# Patient Record
Sex: FEMALE | Race: WHITE
[De-identification: names, ages, dates, MRNs, and addresses within clinical notes are randomized per-mention and may not be internally consistent; named-entity substitution may affect disease eponyms.]

---

## 2017-01-06 ENCOUNTER — HOSPITAL ENCOUNTER (EMERGENCY)
Dept: HOSPITAL 47 - EC | Age: 72
Discharge: HOME | End: 2017-01-06
Payer: MEDICARE

## 2017-01-06 VITALS — DIASTOLIC BLOOD PRESSURE: 63 MMHG | RESPIRATION RATE: 20 BRPM | TEMPERATURE: 99.7 F | SYSTOLIC BLOOD PRESSURE: 145 MMHG

## 2017-01-06 VITALS — HEART RATE: 80 BPM

## 2017-01-06 DIAGNOSIS — Z79.51: ICD-10-CM

## 2017-01-06 DIAGNOSIS — Z79.899: ICD-10-CM

## 2017-01-06 DIAGNOSIS — J44.1: Primary | ICD-10-CM

## 2017-01-06 DIAGNOSIS — Z87.891: ICD-10-CM

## 2017-01-06 DIAGNOSIS — Z92.3: ICD-10-CM

## 2017-01-06 DIAGNOSIS — Z87.19: ICD-10-CM

## 2017-01-06 DIAGNOSIS — Z85.42: ICD-10-CM

## 2017-01-06 DIAGNOSIS — Z87.01: ICD-10-CM

## 2017-01-06 LAB
ALP SERPL-CCNC: 127 U/L (ref 38–126)
ALT SERPL-CCNC: 30 U/L (ref 9–52)
ANION GAP SERPL CALC-SCNC: 10 MMOL/L
APTT BLD: 22.9 SEC (ref 22–30)
AST SERPL-CCNC: 36 U/L (ref 14–36)
BASOPHILS # BLD AUTO: 0.1 K/UL (ref 0–0.2)
BASOPHILS NFR BLD AUTO: 1 %
BUN SERPL-SCNC: 11 MG/DL (ref 7–17)
CALCIUM SPEC-MCNC: 8.6 MG/DL (ref 8.4–10.2)
CH: 31.9
CHCM: 32
CHLORIDE SERPL-SCNC: 103 MMOL/L (ref 98–107)
CK SERPL-CCNC: 85 U/L (ref 30–135)
CO2 SERPL-SCNC: 26 MMOL/L (ref 22–30)
EOSINOPHIL # BLD AUTO: 0.1 K/UL (ref 0–0.7)
EOSINOPHIL NFR BLD AUTO: 1 %
ERYTHROCYTE [DISTWIDTH] IN BLOOD BY AUTOMATED COUNT: 4.36 M/UL (ref 3.8–5.4)
ERYTHROCYTE [DISTWIDTH] IN BLOOD: 13.6 % (ref 11.5–15.5)
GLUCOSE SERPL-MCNC: 99 MG/DL (ref 74–99)
HCT VFR BLD AUTO: 43.7 % (ref 34–46)
HDW: 2.7
HGB BLD-MCNC: 13.9 GM/DL (ref 11.4–16)
INR PPP: 1 (ref ?–1.1)
LUC NFR BLD AUTO: 2 %
LYMPHOCYTES # SPEC AUTO: 1.7 K/UL (ref 1–4.8)
LYMPHOCYTES NFR SPEC AUTO: 11 %
MAGNESIUM SPEC-SCNC: 1.6 MG/DL (ref 1.6–2.3)
MCH RBC QN AUTO: 31.9 PG (ref 25–35)
MCHC RBC AUTO-ENTMCNC: 31.8 G/DL (ref 31–37)
MCV RBC AUTO: 100.2 FL (ref 80–100)
MONOCYTES # BLD AUTO: 0.9 K/UL (ref 0–1)
MONOCYTES NFR BLD AUTO: 6 %
NEUTROPHILS # BLD AUTO: 12.2 K/UL (ref 1.3–7.7)
NEUTROPHILS NFR BLD AUTO: 80 %
NON-AFRICAN AMERICAN GFR(MDRD): >60
POTASSIUM SERPL-SCNC: 5.8 MMOL/L (ref 3.5–5.1)
PROT SERPL-MCNC: 7.4 G/DL (ref 6.3–8.2)
PT BLD: 10.5 SEC (ref 9–12)
SODIUM SERPL-SCNC: 139 MMOL/L (ref 137–145)
TROPONIN I SERPL-MCNC: <0.012 NG/ML (ref 0–0.03)
WBC # BLD AUTO: 0.25 10*3/UL
WBC # BLD AUTO: 15.3 K/UL (ref 3.8–10.6)
WBC (PEROX): 15.66

## 2017-01-06 PROCEDURE — 82553 CREATINE MB FRACTION: CPT

## 2017-01-06 PROCEDURE — 82550 ASSAY OF CK (CPK): CPT

## 2017-01-06 PROCEDURE — 85610 PROTHROMBIN TIME: CPT

## 2017-01-06 PROCEDURE — 71020: CPT

## 2017-01-06 PROCEDURE — 84484 ASSAY OF TROPONIN QUANT: CPT

## 2017-01-06 PROCEDURE — 80053 COMPREHEN METABOLIC PANEL: CPT

## 2017-01-06 PROCEDURE — 93005 ELECTROCARDIOGRAM TRACING: CPT

## 2017-01-06 PROCEDURE — 85025 COMPLETE CBC W/AUTO DIFF WBC: CPT

## 2017-01-06 PROCEDURE — 83735 ASSAY OF MAGNESIUM: CPT

## 2017-01-06 PROCEDURE — 36415 COLL VENOUS BLD VENIPUNCTURE: CPT

## 2017-01-06 PROCEDURE — 83880 ASSAY OF NATRIURETIC PEPTIDE: CPT

## 2017-01-06 PROCEDURE — 96374 THER/PROPH/DIAG INJ IV PUSH: CPT

## 2017-01-06 PROCEDURE — 99285 EMERGENCY DEPT VISIT HI MDM: CPT

## 2017-01-06 PROCEDURE — 85730 THROMBOPLASTIN TIME PARTIAL: CPT

## 2017-01-06 PROCEDURE — 94640 AIRWAY INHALATION TREATMENT: CPT

## 2017-01-06 PROCEDURE — 87040 BLOOD CULTURE FOR BACTERIA: CPT
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
## 2017-01-06 NOTE — XR
EXAMINATION TYPE: XR chest 2V

 

DATE OF EXAM: 1/6/2017 3:19 PM

 

COMPARISON: Chest x-ray September 16, 2016

 

HISTORY: Shortness of breath, possible pneumonia. History of COPD

 

TECHNIQUE:  Frontal and lateral views of the chest are obtained.

 

FINDINGS:  Underlying emphysematous change is felt present. There is elevated left hemidiaphragm. The
re is eventrated right hemidiaphragm redemonstrated. There is no focal air space opacity, pleural eff
usion, or pneumothorax seen.  The cardiac silhouette size is stable and within normal limits with ath
erosclerotic aorta.   The osseous structures are demineralized.

 

IMPRESSION: Chronic emphysematous change without acute pulmonary process. No significant change from 
prior.

## 2017-01-23 ENCOUNTER — HOSPITAL ENCOUNTER (OUTPATIENT)
Dept: HOSPITAL 47 - RADECHMAIN | Age: 72
Discharge: HOME | End: 2017-01-23
Attending: PHYSICIAN ASSISTANT
Payer: MEDICARE

## 2017-01-23 DIAGNOSIS — I27.2: ICD-10-CM

## 2017-01-23 DIAGNOSIS — I08.3: Primary | ICD-10-CM

## 2017-01-23 DIAGNOSIS — I51.7: ICD-10-CM

## 2017-01-23 PROCEDURE — 93306 TTE W/DOPPLER COMPLETE: CPT

## 2017-01-24 NOTE — ECHOF
Referral Reason:R06.09 other forms of dyspnea



MEASUREMENTS

--------

HEIGHT: 165.1 cm

WEIGHT: 115.7 kg

BP: 

RVIDd:   2.5 cm     (< 3.3)

IVSd:   1.1 cm     (0.6 - 1.1)

LVIDd:   4.4 cm     (3.9 - 5.3)

LVPWd:   1.1 cm     (0.6 - 1.1)

IVSs:   1.3 cm

LVIDs:   2.9 cm

LVPWs:   1.4 cm

LA Diam:   4.1 cm     (2.7 - 3.8)

LAESV Index (A-L):   30.82 ml/m

Ao Diam:   2.6 cm     (2.0 - 3.7)

AV Cusp:   1.6 cm     (1.5 - 2.6)

LA Diam:   3.9 cm     (2.7 - 3.8)

MV EXCURSION:   13.970 mm     (> 18.000)

MV EF SLOPE:   75 mm/s     (70 - 150)

EPSS:   0.2 cm

MV E Mert:   0.84 m/s

MV DecT:   176 ms

MV A Mert:   1.04 m/s

MV E/A Ratio:   0.81 

RAP:   5.00 mmHg

RVSP:   39.41 mmHg







FINDINGS

--------

Sinus rhythm.

This was a technically adequate study.

There is mild concentric left ventricular hypertrophy.  

 Overall left ventricular systolic function is 

low-normal with, an EF between 50 - 55 %.

The right ventricle is normal in size.

LA is midly dilated 29-33ml/m2.

The right atrial size is normal.

There is mild aortic valve sclerosis.   There is no 

evidence of aortic regurgitation.

Mild mitral annular calcification present.   Mild 

mitral regurgitation is present.

Mild tricuspid regurgitation present.   There is mild 

pulmonary hypertension.   The right ventricular 

systolic pressure, as measured by Doppler, is 39.41mmHg.

There is no pulmonic regurgitation present.

The aortic root size is normal.

There is no pericardial effusion.



CONCLUSIONS

--------

1. There is mild concentric left ventricular hypertrophy.

2. Overall left ventricular systolic function is 

low-normal with, an EF between 50 - 55 %.

3. LA is midly dilated 29-33ml/m2.

4. There is mild aortic valve sclerosis.

5. Mild mitral annular calcification present.

6. Mild mitral regurgitation is present.

7. Mild tricuspid regurgitation present.

8. There is mild pulmonary hypertension.

9. The right ventricular systolic pressure, as measured by 

Doppler, is 39.41mmHg.





SONOGRAPHER: Telma Flores RDCS

## 2017-04-04 ENCOUNTER — HOSPITAL ENCOUNTER (EMERGENCY)
Dept: HOSPITAL 47 - EC | Age: 72
Discharge: HOME | End: 2017-04-04
Payer: MEDICARE

## 2017-04-04 VITALS
TEMPERATURE: 97.5 F | HEART RATE: 73 BPM | SYSTOLIC BLOOD PRESSURE: 127 MMHG | DIASTOLIC BLOOD PRESSURE: 61 MMHG | RESPIRATION RATE: 16 BRPM

## 2017-04-04 DIAGNOSIS — Z87.891: ICD-10-CM

## 2017-04-04 DIAGNOSIS — E78.5: ICD-10-CM

## 2017-04-04 DIAGNOSIS — Z79.52: ICD-10-CM

## 2017-04-04 DIAGNOSIS — J45.909: ICD-10-CM

## 2017-04-04 DIAGNOSIS — Z87.01: ICD-10-CM

## 2017-04-04 DIAGNOSIS — J44.1: Primary | ICD-10-CM

## 2017-04-04 DIAGNOSIS — Z79.51: ICD-10-CM

## 2017-04-04 DIAGNOSIS — Z85.42: ICD-10-CM

## 2017-04-04 DIAGNOSIS — Z99.81: ICD-10-CM

## 2017-04-04 DIAGNOSIS — Z92.3: ICD-10-CM

## 2017-04-04 LAB
ANION GAP SERPL CALC-SCNC: 9 MMOL/L
BASOPHILS # BLD AUTO: 0.1 K/UL (ref 0–0.2)
BASOPHILS NFR BLD AUTO: 1 %
BUN SERPL-SCNC: 20 MG/DL (ref 7–17)
CALCIUM SPEC-MCNC: 8.8 MG/DL (ref 8.4–10.2)
CH: 31.5
CHCM: 33.5
CHLORIDE SERPL-SCNC: 100 MMOL/L (ref 98–107)
CO2 SERPL-SCNC: 30 MMOL/L (ref 22–30)
EOSINOPHIL # BLD AUTO: 0.1 K/UL (ref 0–0.7)
EOSINOPHIL NFR BLD AUTO: 1 %
ERYTHROCYTE [DISTWIDTH] IN BLOOD BY AUTOMATED COUNT: 3.74 M/UL (ref 3.8–5.4)
ERYTHROCYTE [DISTWIDTH] IN BLOOD: 14.6 % (ref 11.5–15.5)
GLUCOSE SERPL-MCNC: 113 MG/DL (ref 74–99)
HCT VFR BLD AUTO: 35.3 % (ref 34–46)
HDW: 2.65
HGB BLD-MCNC: 11.9 GM/DL (ref 11.4–16)
LUC NFR BLD AUTO: 3 %
LYMPHOCYTES # SPEC AUTO: 1.2 K/UL (ref 1–4.8)
LYMPHOCYTES NFR SPEC AUTO: 9 %
MCH RBC QN AUTO: 31.9 PG (ref 25–35)
MCHC RBC AUTO-ENTMCNC: 33.7 G/DL (ref 31–37)
MCV RBC AUTO: 94.6 FL (ref 80–100)
MONOCYTES # BLD AUTO: 0.6 K/UL (ref 0–1)
MONOCYTES NFR BLD AUTO: 5 %
NEUTROPHILS # BLD AUTO: 11 K/UL (ref 1.3–7.7)
NEUTROPHILS NFR BLD AUTO: 82 %
NON-AFRICAN AMERICAN GFR(MDRD): >60
POTASSIUM SERPL-SCNC: 5.2 MMOL/L (ref 3.5–5.1)
SODIUM SERPL-SCNC: 139 MMOL/L (ref 137–145)
WBC # BLD AUTO: 0.37 10*3/UL
WBC # BLD AUTO: 13.3 K/UL (ref 3.8–10.6)
WBC (PEROX): 13.91

## 2017-04-04 PROCEDURE — 83880 ASSAY OF NATRIURETIC PEPTIDE: CPT

## 2017-04-04 PROCEDURE — 85379 FIBRIN DEGRADATION QUANT: CPT

## 2017-04-04 PROCEDURE — 80048 BASIC METABOLIC PNL TOTAL CA: CPT

## 2017-04-04 PROCEDURE — 99285 EMERGENCY DEPT VISIT HI MDM: CPT

## 2017-04-04 PROCEDURE — 96374 THER/PROPH/DIAG INJ IV PUSH: CPT

## 2017-04-04 PROCEDURE — 71275 CT ANGIOGRAPHY CHEST: CPT

## 2017-04-04 PROCEDURE — 94640 AIRWAY INHALATION TREATMENT: CPT

## 2017-04-04 PROCEDURE — 87502 INFLUENZA DNA AMP PROBE: CPT

## 2017-04-04 PROCEDURE — 93005 ELECTROCARDIOGRAM TRACING: CPT

## 2017-04-04 PROCEDURE — 85025 COMPLETE CBC W/AUTO DIFF WBC: CPT

## 2017-04-04 PROCEDURE — 71020: CPT

## 2017-04-04 PROCEDURE — 36415 COLL VENOUS BLD VENIPUNCTURE: CPT

## 2017-04-04 PROCEDURE — 84484 ASSAY OF TROPONIN QUANT: CPT

## 2017-04-04 NOTE — CT
EXAMINATION TYPE: CT chest angio for PE

 

DATE OF EXAM: 4/4/2017 5:54 PM

 

COMPARISON: 9/7/2013

 

HISTORY: shortness of breath

 

CT DLP: 848.8 mGycm

Automated exposure control for dose reduction was used.

 

CONTRAST: 

CT Chest for pulmonary embolism performed with with IV Contrast, patient injected with 100 mL of Omni
paque 350. There are 3-D post processed images.

 

FINDINGS:

There is patchy groundglass interstitial infiltrate in both lungs and worse on the right side. There 
is some subpleural infiltrate in the right middle lobe. There is no pericardial effusion. There is no
 pleural effusion.

 

I see no filling defects in the pulmonary arteries. There are no hilar masses. There is no mediastina
l adenopathy. Thoracic aorta is atheromatous. There is no sign of aortic aneurysm or dissection. Ther
e is some mild subpleural infiltrate as well in the lateral left lung base and the lingula left upper
 lobe. I see no bony destructive process.

IMPRESSION:

No evidence of pulmonary embolism. There is patchy groundglass interstitial pulmonary infiltrates and
 also subpleural small areas of consolidation in both lungs similar to the old exam and consistent wi
th nonspecific interstitial lung disease. There is a changing pattern of infiltrate at the lung bases
 compared to old exam.
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
## 2017-04-04 NOTE — XR
EXAMINATION TYPE: XR chest 2V

 

DATE OF EXAM: 4/4/2017 4:40 PM

 

COMPARISON: 1/6/2017

 

HISTORY: 71 year-old female shortness of breath

 

TECHNIQUE:  PA and lateral views

 

FINDINGS:  

Heart is normal size. Aortopulmonary vasculature within normal limits. Mild diffuse interstitial prom
inence without consolidation or pleural effusion.

 

 

IMPRESSION:  

Interstitial changes appear in part chronic. These do appear more accentuated from previous. Correlat
e for possible bronchitis, worsening asthma, or atypical pneumonias.

## 2017-05-08 ENCOUNTER — HOSPITAL ENCOUNTER (INPATIENT)
Dept: HOSPITAL 47 - 2ORMAIN | Age: 72
LOS: 7 days | Discharge: NURSING, REHAB OR LONG-TERM CARE | DRG: 167 | End: 2017-05-15
Payer: MEDICARE

## 2017-05-08 VITALS — BODY MASS INDEX: 47.5 KG/M2

## 2017-05-08 DIAGNOSIS — J98.11: ICD-10-CM

## 2017-05-08 DIAGNOSIS — J84.9: Primary | ICD-10-CM

## 2017-05-08 DIAGNOSIS — Z79.899: ICD-10-CM

## 2017-05-08 DIAGNOSIS — J95.812: ICD-10-CM

## 2017-05-08 DIAGNOSIS — Z82.5: ICD-10-CM

## 2017-05-08 DIAGNOSIS — N17.9: ICD-10-CM

## 2017-05-08 DIAGNOSIS — E87.5: ICD-10-CM

## 2017-05-08 DIAGNOSIS — Z71.3: ICD-10-CM

## 2017-05-08 DIAGNOSIS — F17.200: ICD-10-CM

## 2017-05-08 DIAGNOSIS — J44.1: ICD-10-CM

## 2017-05-08 DIAGNOSIS — R73.09: ICD-10-CM

## 2017-05-08 DIAGNOSIS — Z85.42: ICD-10-CM

## 2017-05-08 DIAGNOSIS — J45.20: ICD-10-CM

## 2017-05-08 DIAGNOSIS — Z90.710: ICD-10-CM

## 2017-05-08 DIAGNOSIS — J84.116: ICD-10-CM

## 2017-05-08 DIAGNOSIS — E78.5: ICD-10-CM

## 2017-05-08 DIAGNOSIS — Z79.51: ICD-10-CM

## 2017-05-08 DIAGNOSIS — Z99.81: ICD-10-CM

## 2017-05-08 DIAGNOSIS — R01.1: ICD-10-CM

## 2017-05-08 DIAGNOSIS — D72.829: ICD-10-CM

## 2017-05-08 DIAGNOSIS — Z90.49: ICD-10-CM

## 2017-05-08 DIAGNOSIS — T79.7XXA: ICD-10-CM

## 2017-05-08 DIAGNOSIS — Z79.52: ICD-10-CM

## 2017-05-08 DIAGNOSIS — M19.90: ICD-10-CM

## 2017-05-08 DIAGNOSIS — I89.0: ICD-10-CM

## 2017-05-08 DIAGNOSIS — M48.00: ICD-10-CM

## 2017-05-08 DIAGNOSIS — J96.11: ICD-10-CM

## 2017-05-08 DIAGNOSIS — Z86.69: ICD-10-CM

## 2017-05-08 DIAGNOSIS — E66.9: ICD-10-CM
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
## 2017-05-08 DIAGNOSIS — Z99.3: ICD-10-CM

## 2017-05-08 DIAGNOSIS — K21.9: ICD-10-CM

## 2017-05-08 DIAGNOSIS — Z87.01: ICD-10-CM

## 2017-05-08 DIAGNOSIS — R32: ICD-10-CM

## 2017-05-08 DIAGNOSIS — F32.9: ICD-10-CM

## 2017-05-08 DIAGNOSIS — I10: ICD-10-CM

## 2017-05-08 DIAGNOSIS — Z87.11: ICD-10-CM

## 2017-05-08 LAB — GLUCOSE BLD-MCNC: 123 MG/DL (ref 75–99)

## 2017-05-08 PROCEDURE — 0BBF4ZX EXCISION OF RIGHT LOWER LUNG LOBE, PERCUTANEOUS ENDOSCOPIC APPROACH, DIAGNOSTIC: ICD-10-PCS

## 2017-05-08 PROCEDURE — 71020: CPT

## 2017-05-08 PROCEDURE — 87116 MYCOBACTERIA CULTURE: CPT

## 2017-05-08 PROCEDURE — 87206 SMEAR FLUORESCENT/ACID STAI: CPT

## 2017-05-08 PROCEDURE — 94640 AIRWAY INHALATION TREATMENT: CPT

## 2017-05-08 PROCEDURE — 88307 TISSUE EXAM BY PATHOLOGIST: CPT

## 2017-05-08 PROCEDURE — 71010: CPT

## 2017-05-08 PROCEDURE — 85025 COMPLETE CBC W/AUTO DIFF WBC: CPT

## 2017-05-08 PROCEDURE — 87102 FUNGUS ISOLATION CULTURE: CPT

## 2017-05-08 PROCEDURE — 0BBC4ZX EXCISION OF RIGHT UPPER LUNG LOBE, PERCUTANEOUS ENDOSCOPIC APPROACH, DIAGNOSTIC: ICD-10-PCS

## 2017-05-08 PROCEDURE — 94760 N-INVAS EAR/PLS OXIMETRY 1: CPT

## 2017-05-08 PROCEDURE — 0W9930Z DRAINAGE OF RIGHT PLEURAL CAVITY WITH DRAINAGE DEVICE, PERCUTANEOUS APPROACH: ICD-10-PCS

## 2017-05-08 PROCEDURE — 0BBD4ZX EXCISION OF RIGHT MIDDLE LUNG LOBE, PERCUTANEOUS ENDOSCOPIC APPROACH, DIAGNOSTIC: ICD-10-PCS

## 2017-05-08 PROCEDURE — 80048 BASIC METABOLIC PNL TOTAL CA: CPT

## 2017-05-08 RX ADMIN — POTASSIUM CHLORIDE SCH MLS/HR: 14.9 INJECTION, SOLUTION INTRAVENOUS at 17:11

## 2017-05-08 RX ADMIN — IPRATROPIUM BROMIDE AND ALBUTEROL SULFATE SCH ML: .5; 3 SOLUTION RESPIRATORY (INHALATION) at 19:55

## 2017-05-08 RX ADMIN — HYDROCODONE BITARTRATE AND ACETAMINOPHEN PRN EACH: 5; 325 TABLET ORAL at 17:50

## 2017-05-08 RX ADMIN — IPRATROPIUM BROMIDE AND ALBUTEROL SULFATE SCH: .5; 3 SOLUTION RESPIRATORY (INHALATION) at 14:48

## 2017-05-08 RX ADMIN — HYDROMORPHONE HYDROCHLORIDE PRN MG: 1 INJECTION, SOLUTION INTRAMUSCULAR; INTRAVENOUS; SUBCUTANEOUS at 11:18

## 2017-05-08 RX ADMIN — BUDESONIDE AND FORMOTEROL FUMARATE DIHYDRATE SCH PUFF: 160; 4.5 AEROSOL RESPIRATORY (INHALATION) at 19:57

## 2017-05-08 RX ADMIN — HYDROCODONE BITARTRATE AND ACETAMINOPHEN PRN EACH: 5; 325 TABLET ORAL at 23:15

## 2017-05-08 RX ADMIN — HYDROMORPHONE HYDROCHLORIDE PRN MG: 1 INJECTION, SOLUTION INTRAMUSCULAR; INTRAVENOUS; SUBCUTANEOUS at 11:00

## 2017-05-08 RX ADMIN — HEPARIN SODIUM SCH UNIT: 5000 INJECTION, SOLUTION INTRAVENOUS; SUBCUTANEOUS at 23:10

## 2017-05-08 RX ADMIN — IPRATROPIUM BROMIDE AND ALBUTEROL SULFATE SCH ML: .5; 3 SOLUTION RESPIRATORY (INHALATION) at 16:02

## 2017-05-08 NOTE — P.OP
Date of Procedure: 05/08/17


Preoperative Diagnosis: 


Bilateral pulmonary infiltrates


Postoperative Diagnosis: 


Bilateral pulmonary infiltrates


Procedure(s) Performed: 


Thoracoscopic right lung biopsy


Anesthesia: SUHA


Surgeon: Grabiel Banerjee


Assistant #1: Marcos Tomlinson


Estimated Blood Loss (ml): 10


IV fluids (ml): 200


Urine output (ml): 0


Pathology: other (Random wedge biopsies of right upper middle and lower lobe 

were sent for pathology as well as cultures including routine acid-fast and 

fungal cultures)


Condition: stable


Disposition: PACU


Indications for Procedure: 


Bilateral pulmonary infiltrates


Operative Findings: 


Lung compliance was poor.  There was mild anthracotic staining in the lung 

diffusely.  The lung otherwise appeared normal.  There were no pulmonary masses 

noted.


Description of Procedure: 


The patient was brought to the operating room placed supine on the operating 

table anesthetized and intubated with a double-lumen endotracheal tube.  The 

tube was positioned with fiberoptic bronchoscopy.  Patient was turned in the 

left lateral decubitus position.  The chest was sterilely prepped and draped on 

the right side.  3 one-inch incisions were performed in the chest.  The right 

lung was deflated.  The chest was explored.  Lung compliance was poor.  There 

were no evident masses or abnormalities other than poor lung compliance and 

mild anthracotic pigment





Wedge resections of the upper middle and lower lobe were each obtained 

separately with 2 firings each of Endo NICANOR 45 blue staple load.  These were 

brought out onto the field and divided a small portion of each was sent for 

culture.  The remainder was sent for pathology.  On completion of the biopsies 

the lung was inflated under direct vision.  No air leaks noted.  28-Angolan 

chest tube was placed anteriorly through a separate stab incision and 

positioned posterior apically.  It was secured with 0 Ethibond suture.  Rib 

blocks were performed at the level of the incisions with total of 10 mL of half 

percent Marcaine.  The incisions were then closed with layers of Vicryl suture 

and dressed with glue and Band-Aid dressings.  Chest tube dressing was placed 

around the chest tube it was secured and connected to a Pleur-evac.





The patient was turned supine and extubated and transferred to recovery in 

stable condition.

## 2017-05-08 NOTE — CONS
DATE OF CONSULTATION:  



This patient is a 71-year-old female with a history of underlying COPD 

and interstitial lung disease. Because of worsening respiratory 

symptoms and the inability to gain control over the patient's 

respiratory symptoms, after a long discussion with the patient 

weighing both the pros and cons, it was decided that she would be sent 

for a VATS lung biopsy to better determine the cause of her 

interstitial lung disease. Because of ongoing tobacco use, I was 

concerned about 2 processes in particular, one being respiratory 

bronchiolitis interstitial lung disease (RB-ILD), and DIP 

(desquamative interstitial pneumonia).  The patient was sent to Dr. Claus Banerjee for evaluation. Today she had a VATS procedure done on 

the right side and she had a biopsy performed. The patient seemed to 

be resting relatively comfortably. Of course, she has the 

postoperative pain that one would expect. No respiratory issues to 

speak of. No shortness of breath more than usual. She does have 

chronic dyspnea with any activity. No fever. No chills. No chest pain. 

No nausea, vomiting or diarrhea.  



Her past medical history is positive for COPD interstitial lung 

disease. Her surgical history is mostly remote.  



Her home medications include: 

1. Requip.

2. Prednisone. 

3. Potassium chloride. 

4. DuoNeb. 

5. Lasix. 

6. Vitamin D3. 

7. Symbicort. 

8. Albuterol inhaler. 

9. Tudorza. 



ALLERGIES: DENIED. 



Social history is positive for ongoing tobacco use. Family history is 

noncontributory. Occupational history is noncontributory.  



REVIEW OF SYSTEMS: 

CONSTITUTIONAL: Negative. 

NEUROLOGIC: Negative. 

HEENT: Negative. 

CARDIOVASCULAR: Negative. 

PULMONARY: Shortness of breath, persistent and severe. 

GI/: Negative. 

RHEUMATOLOGICAL/IMMUNOLOGIC: Negative. 

ENDOCRINOLOGIC: Negative. 

DERMATOLOGIC: Negative. 



Current vital signs include temperature 96.7, heart rate 71, 

respiratory rate 20, blood pressure 150/74. Three-liter saturation 

94%. Appears in no acute distress. HEENT examination is grossly 

unremarkable. Mucous membranes are moist. No oral lesions. Neck is 

supple. Full range of motion. No adenopathy or thyromegaly. Neck veins 

are flat. Cardiovascular examination reveals regular rhythm and rate. 

S1, S2 normal. No S3, S4 or murmur. Lungs reveal bibasilar crackles. 

Breath sounds are diminished. A few scattered coarse rhonchi are 

noted. Breath sounds are equal but diminished throughout. Abdomen is 

soft. Bowel sounds are heard. Extremities are intact. No cyanosis, 

clubbing or edema. Skin without rash.  

Neurological examination is nonfocal. 



Lab data is reviewed. The only present currently is a glucose of 123. 

X-rays are reviewed. Medications are reviewed.  



ASSESSMENT: 

1. Status post VATS lung biopsy, right-sided, for interstitial lung 

disease, with concerns about desquamative interstitial pneumonia and 

respiratory bronchiolitis interstitial lung disease, given the 

patient's ongoing tobacco use.  

2. History of chronic obstructive pulmonary disease. 

3. History of interstitial lung disease. 

4. Multiple chronic obstructive pulmonary disease and respiratory 

exacerbations.  



PLAN: Will await the results of lung biopsy. Hopefully discharge in 

the next couple of days. Will continue to follow closely. No 

additional recommendations are made. Medications are reviewed. Labs 

are reviewed. Will follow.

## 2017-05-08 NOTE — XR
EXAMINATION TYPE: XR chest 1V

 

DATE OF EXAM: 5/8/2017 10:54 AM

 

COMPARISON: 4/4/2017

 

HISTORY: Post

 

TECHNIQUE: Single frontal view of the chest is obtained.

 

FINDINGS:  Right-sided chest tube seen with no sizable pneumothorax. Bilateral areas of consolidation
 and small left effusion noted. Heart size stable. Arthropathy of the shoulders.

 

IMPRESSION:  

1. Bilateral areas of consolidation and small left.

2. No evidence of pneumothorax. Chest tube noted.

## 2017-05-09 LAB
ANION GAP SERPL CALC-SCNC: 7 MMOL/L
BASOPHILS # BLD AUTO: 0 K/UL (ref 0–0.2)
BASOPHILS NFR BLD AUTO: 0 %
BUN SERPL-SCNC: 27 MG/DL (ref 7–17)
CALCIUM SPEC-MCNC: 8.4 MG/DL (ref 8.4–10.2)
CH: 31.6
CHCM: 31.5
CHLORIDE SERPL-SCNC: 101 MMOL/L (ref 98–107)
CO2 SERPL-SCNC: 30 MMOL/L (ref 22–30)
EOSINOPHIL # BLD AUTO: 0 K/UL (ref 0–0.7)
EOSINOPHIL NFR BLD AUTO: 0 %
ERYTHROCYTE [DISTWIDTH] IN BLOOD BY AUTOMATED COUNT: 3.58 M/UL (ref 3.8–5.4)
ERYTHROCYTE [DISTWIDTH] IN BLOOD: 15.1 % (ref 11.5–15.5)
GLUCOSE SERPL-MCNC: 168 MG/DL (ref 74–99)
HCT VFR BLD AUTO: 36.2 % (ref 34–46)
HDW: 2.87
HGB BLD-MCNC: 11.6 GM/DL (ref 11.4–16)
LUC NFR BLD AUTO: 3 %
LYMPHOCYTES # SPEC AUTO: 2.1 K/UL (ref 1–4.8)
LYMPHOCYTES NFR SPEC AUTO: 14 %
MCH RBC QN AUTO: 32.4 PG (ref 25–35)
MCHC RBC AUTO-ENTMCNC: 32.1 G/DL (ref 31–37)
MCV RBC AUTO: 101.1 FL (ref 80–100)
MONOCYTES # BLD AUTO: 1.3 K/UL (ref 0–1)
MONOCYTES NFR BLD AUTO: 9 %
NEUTROPHILS # BLD AUTO: 10.7 K/UL (ref 1.3–7.7)
NEUTROPHILS NFR BLD AUTO: 73 %
NON-AFRICAN AMERICAN GFR(MDRD): 42
POTASSIUM SERPL-SCNC: 5.4 MMOL/L (ref 3.5–5.1)
SODIUM SERPL-SCNC: 138 MMOL/L (ref 137–145)
WBC # BLD AUTO: 0.46 10*3/UL
WBC # BLD AUTO: 14.7 K/UL (ref 3.8–10.6)
WBC (PEROX): 13.21

## 2017-05-09 RX ADMIN — PANTOPRAZOLE SODIUM SCH MG: 40 TABLET, DELAYED RELEASE ORAL at 06:39

## 2017-05-09 RX ADMIN — POTASSIUM CHLORIDE SCH MLS/HR: 14.9 INJECTION, SOLUTION INTRAVENOUS at 06:33

## 2017-05-09 RX ADMIN — SODIUM CHLORIDE, PRESERVATIVE FREE SCH ML: 5 INJECTION INTRAVENOUS at 22:32

## 2017-05-09 RX ADMIN — IPRATROPIUM BROMIDE AND ALBUTEROL SULFATE SCH ML: .5; 3 SOLUTION RESPIRATORY (INHALATION) at 16:39

## 2017-05-09 RX ADMIN — IPRATROPIUM BROMIDE AND ALBUTEROL SULFATE SCH ML: .5; 3 SOLUTION RESPIRATORY (INHALATION) at 20:32

## 2017-05-09 RX ADMIN — HYDROCODONE BITARTRATE AND ACETAMINOPHEN PRN EACH: 5; 325 TABLET ORAL at 08:24

## 2017-05-09 RX ADMIN — Medication SCH UNIT: at 08:26

## 2017-05-09 RX ADMIN — BUDESONIDE AND FORMOTEROL FUMARATE DIHYDRATE SCH PUFF: 160; 4.5 AEROSOL RESPIRATORY (INHALATION) at 07:57

## 2017-05-09 RX ADMIN — SODIUM CHLORIDE, PRESERVATIVE FREE SCH: 5 INJECTION INTRAVENOUS at 08:26

## 2017-05-09 RX ADMIN — HYDROCODONE BITARTRATE AND ACETAMINOPHEN PRN EACH: 5; 325 TABLET ORAL at 23:30

## 2017-05-09 RX ADMIN — HEPARIN SODIUM SCH UNIT: 5000 INJECTION, SOLUTION INTRAVENOUS; SUBCUTANEOUS at 22:30

## 2017-05-09 RX ADMIN — IPRATROPIUM BROMIDE AND ALBUTEROL SULFATE SCH ML: .5; 3 SOLUTION RESPIRATORY (INHALATION) at 07:57

## 2017-05-09 RX ADMIN — BUDESONIDE AND FORMOTEROL FUMARATE DIHYDRATE SCH: 160; 4.5 AEROSOL RESPIRATORY (INHALATION) at 20:32

## 2017-05-09 RX ADMIN — IPRATROPIUM BROMIDE AND ALBUTEROL SULFATE SCH ML: .5; 3 SOLUTION RESPIRATORY (INHALATION) at 11:14

## 2017-05-09 RX ADMIN — BUDESONIDE AND FORMOTEROL FUMARATE DIHYDRATE SCH: 160; 4.5 AEROSOL RESPIRATORY (INHALATION) at 08:07

## 2017-05-09 RX ADMIN — HEPARIN SODIUM SCH UNIT: 5000 INJECTION, SOLUTION INTRAVENOUS; SUBCUTANEOUS at 08:25

## 2017-05-09 NOTE — P.PN
Subjective


Principal diagnosis: 





Bilateral pulmonary infiltrates, COPD





POD #1 thorascopic right lung biopsy





Currently sitting up in bed in no apparent distress.  Does complain of pain at 

her chest tube site.





Objective





- Vital Signs


Vital signs: 


 Vital Signs











Temp  97.0 F L  05/09/17 04:00


 


Pulse  76   05/09/17 04:00


 


Resp  20   05/09/17 04:00


 


BP  198/67   05/09/17 04:00


 


Pulse Ox  91 L  05/09/17 04:00








 Intake & Output











 05/08/17 05/09/17 05/09/17





 18:59 06:59 18:59


 


Intake Total 1820 920 


 


Output Total 50 147 


 


Balance 1770 773 


 


Weight  130 kg 


 


Intake:   


 


  IV 1300 820 


 


    Dextrose 5%-0.45% NaCl 1,  820 





    000 ml @ 75 mls/hr IV .   





    [id number] NED Rx#:[id number]   


 


  Intake, IV Titration 300 100 





  Amount   


 


    Dextrose 5%-0.45% NaCl 1, 300  





    000 ml @ 75 mls/hr IV .   





    [id number] NED Rx#:[id number]   


 


    ceFAZolin 3 gm In Sodium  100 





    Chloride 0.9% 100 ml @   





    100 mls/hr IVPB Q8HR NED   





    Rx#:[id number]   


 


  Oral 220  


 


Output:   


 


  Chest Tube Drainage 30 113 


 


    Chest Tube Right Lateral 30 113 





    Chest   


 


  Drainage  34 


 


    Right Lower Chest  34 


 


  Estimated Blood Loss 20  


 


Other:   


 


  Voiding Method Toilet Bedside Commode 





 Incontinent Incontinent 


 


  # Voids 1 0 














- Constitutional


General appearance: Present: cooperative, no acute distress





- Respiratory


Details: 





Lungs sounds diminished bilaterally.  Respirations even, nonlabored.  Currently 

on 3 L nasal cannula.  Oxygen saturation 91%.  Right pleural chest tube to -20 

cm wall suction.  Drained 60 mL serosanguineous fluid overnight, 210 mL since 

surgery.  Positive air leak.  Chest x-ray reviewed.  Achieving 1000 mL on 

incentive spirometry.  Effective cough.





- Cardiovascular


Details: 





S1, S2 present.  Regular rate and rhythm, normal sinus rhythm on telemetry.  

Positive lower extremity lymphedema.  Refusing to wear MONICA hose.  SCDs to be 

placed.





- Gastrointestinal


Gastrointestinal Comment(s): 





Abdomen soft, nontender, nondistended.  Active bowel sounds 4 quadrants.  

Tolerating diet.





- Genitourinary


Genitourinary Comment(s): 





Minimal clear yellow urine output.





- Musculoskeletal


Musculoskeletal: Present: strength equal bilaterally





- Psychiatric


Psychiatric: Present: A&O x's 3, appropriate affect, intact judgment & insight





- Allied health notes


Allied health notes reviewed: nursing





- Labs


CBC & Chem 7: 


 05/09/17 06:20





 05/09/17 06:20


Labs: 


 Abnormal Lab Results - Last 24 Hours (Table)











  05/08/17 05/09/17 05/09/17 Range/Units





  08:53 06:20 06:20 


 


WBC   14.7 H   (3.8-10.6)  k/uL


 


RBC   3.58 L   (3.80-5.40)  m/uL


 


MCV   101.1 H D   (80.0-100.0)  fL


 


Neutrophils #   10.7 H   (1.3-7.7)  k/uL


 


Monocytes #   1.3 H   (0-1.0)  k/uL


 


Potassium    5.4 H  (3.5-5.1)  mmol/L


 


BUN    27 H  (7-17)  mg/dL


 


Creatinine    1.25 H  (0.52-1.04)  mg/dL


 


Glucose    168 H  (74-99)  mg/dL


 


POC Glucose (mg/dL)  123 H    (75-99)  mg/dL








 Microbiology - Last 24 Hours (Table)











 05/08/17 10:05 Fungal Culture - Preliminary





 Lung - Right 


 


 05/08/17 10:00 Acid Fast Bacilli Culture - Preliminary





 Lung - Right Upper Lobe 


 


 05/08/17 10:05 Acid Fast Bacilli Culture - Preliminary





 Lung - Right 


 


 05/08/17 10:10 Fungal Culture - Preliminary





 Lung - Right Lower Lobe 


 


 05/08/17 10:10 Acid Fast Bacilli Culture - Preliminary





 Lung - Right Lower Lobe 


 


 05/08/17 10:00 Fungal Culture - Preliminary





 Lung - Right Upper Lobe 














- Imaging and Cardiology


Chest x-ray: image reviewed





Assessment and Plan


(1) Bilateral pulmonary infiltrates on CXR


Status: Acute   





(2) Acute exacerbation of chronic obstructive airways disease


Status: Acute   


Plan: 


Patient was seen and examined.  Right pleural chest tube with positive air 

leak.  Chest x-ray reviewed.  Will keep right pleural chest tube to wall 

suction.  Repeat chest x-ray in the morning.  Continue current medication 

regimen.  Encourage incentive spirometry use.  Place SCDs, encourage ambulation 

in the room.  Will consult internal medicine for comorbid medical issues.


Time with Patient: Greater than 30

## 2017-05-09 NOTE — PN
Postop day #1, status post right-sided VATS lung biopsy for 

interstitial lung disease. I am concerned about 2 disease processes 

including respiratory bronchiolitis interstitial lung disease as well 

as (      ) desquamative interstitial pneumonia. She has a history of 

chronic tobacco use and I believe she is probably still smoking. 

Anyway, from that standpoint, she is doing well. Surgery was done by 

Dr. Banerjee. She is feeling well. A little bit pain that is being 

controlled nicely with pain medication. I told her to use her 

incentive spirometer every one hour while awake.  



Current vital signs include a temperature which is 97, heart rate 76, 

respiratory rate 18, blood pressure 109/53, mean 71, three-liter 

saturation 93%. Appears in no acute distress.  

HEENT examination is grossly unremarkable. Mucous membranes are moist. 

No oral lesions.  

NECK:  Supple. Full range of motion. No adenopathy, thyromegaly or 

neck vein distention.  

Cardiovascular examination reveals regular rhythm and rate. Heart rate 

about mid 80s. S1, S2 normal. There is no murmur.  

Lungs reveal diminished breath sounds. She cannot take a deep breath 

because of the pain. A few scattered rhonchi. No wheezes.  

Abdomen is obese. Bowel sounds are heard. 

Extremities are intact. No cyanosis, clubbing or edema. 

Skin is without rash. 

Brief neurologic examination is nonfocal. 



White count 14.7, hemoglobin 11.6, hematocrit 36.2, platelet count 

normal. Sodium 138, potassium 5.4, chloride 101, CO2 of 30, BUN and 

creatinine were 27 and 1.25. The rest of the labs look okay.  



X-rays reviewed. 



ASSESSMENT: 

1. Postoperative day #1, status post right-sided VATS lung biopsy for 

interstitial lung disease. Concerns are that she might have something 

like desquamative interstitial pneumonia and/or respiratory 

bronchiolitis interstitial lung disease, both conditions associated 

with tobacco use.  

2. History of chronic obstructive pulmonary disease. 

3. Interstitial lung disease. 

4. Multiple chronic obstructive pulmonary disease exacerbations. 



PLAN: Will continue to follow. Prognosis is guarded. I told her to 

make sure she uses her incentive spirometer. Her medications have been 

reviewed. I did tell her that the specimens will be evaluated here by 

our general pathologist and likely sent to [facility] where they will 

probably be looked at by Dr. Butch Mathews who is an expert in lung 

pathology.

## 2017-05-09 NOTE — XR
EXAMINATION TYPE: XR chest 1V portable

 

DATE OF EXAM: 5/9/2017 7:29 AM

 

COMPARISON: Prior chest x-ray 5/8/2017

 

HISTORY: Postop, chest tube

 

TECHNIQUE: Single frontal view of the chest is obtained.

 

FINDINGS:  Right-sided chest tube remains in place. No sizable pneumothorax or pleural effusion. Patc
hy density is somewhat improved in the right lung. Cardiomediastinal silhouette, pulmonary vascularit
y and thiago are within normal limits.

 

IMPRESSION:  Improvement in aeration.

## 2017-05-09 NOTE — CONS
DATE OF CONSULTATION:  



REASON FOR CONSULTATION: Advice regarding hypertension and multiple 

other medical issues, requested by Dr. Banerjee.  



HISTORY OF PRESENT ILLNESS: This 71-year-old woman with a past medical 

history of multiple medical problems, including asthma, COPD, history 

of GERD, hypertension, hyperlipidemia, history of DJD, history of 

pneumonia, history of chronic hypoxic respiratory failure, history of 

depression, being followed by Dr. Moreira and Dr. Chino in the 

outpatient setting, underwent VATS lung biopsy to rule out the 

possibility of  interstitial lung disease by Dr. Banerjee. 

Postoperatively patient had some air leak. Chest tube was inserted and 

the patient is being closely monitored at this time. There is no 

history of any fever, rigor, or chills.  No history of any headache, 

loss of consciousness, seizures.  



PAST MEDICAL HISTORY: 

1. History of asthma. 

2. COPD. 

3. GERD. 

4. Hypertension. 

5. Hyperlipidemia.

6. History of DJD. 

7. History of chronic hypoxic respiratory failure. 

8. History of depression. 

9. History of nicotine dependence. 



HOME MEDICATIONS: 

1. Requip 0.5 mg at bedtime. 

2. Prednisone 5 mg daily. 

3. Klor-Con 10 mEq p.o. each morning. 

4. DuoNeb q.i.d. and p.r.n. 

5. Lasix 20 mg daily. 

6. Vitamin D3 1000 daily. 

7. Symbicort 160/4.5 two puffs b.i.d. 

8. Ventolin HFA 1 to 2 puffs q.6 p.r.n. 

9. Tudorza 1 puff daily. 



ALLERGIES: NONE. 



FAMILY HISTORY: No history of heart disease or strokes in the family.  

History of asthma in the family. 



SOCIAL HISTORY: Previous history of smoking. No current smoking or 

alcohol intake.  



REVIEW OF SYSTEMS: 

ENT: No diminishing hearing. No diminished vision. 

CARDIOVASCULAR SYSTEM: No angina, palpitations. 

RESPIRATORY SYSTEM: As mentioned earlier. 

GI: No nausea, vomiting.

: No dysuria. 

NERVOUS SYSTEM: No numbness or weakness. 

ALLERGY/IMMUNOLOGY: No asthma, hayfever. 

MUSCULOSKELETAL: As mentioned earlier. 

HEMATOLOGY/ONCOLOGY: No history of anemia. 

ENDOCRINE: No history of diabetes, hypothyroidism. 

CONSTITUTIONAL:  As mentioned earlier.

DERMATOLOGY: Negative. 

RHEUMATOLOGY: Negative. 

PSYCHIATRY: As mentioned earlier. 



PHYSICAL EXAMINATION: Patient is alert and oriented x3. Pulse 78, 

blood pressure 121/67, respiration 18, temperature normal, pulse ox 

94% on 3 L.  

HEENT: Conjunctivae normal. 

NECK: No jugular venous distention. 

CARDIOVASCULAR SYSTEM: S1, S2 muffled. 

RESPIRATORY SYSTEM: Breath sounds diminished at the bases. Bilateral 

scattered rhonchi and crackles. Expiratory wheezing also present. 

Right-sided chest tube; drainage present with continued air leak.  

ABDOMEN: Soft, nontender. No mass palpable. 

LEGS: No edema. No swelling. 

NERVOUS SYSTEM: Higher functions as mentioned earlier. Moves all 4 

limbs. No focal motor or sensory deficit.  

LYMPHATICS:  No lymph node palpable in neck, axillae or groin.   

SKIN: No ulcer, rash, bleeding. 



LABS: WBC 14.7, hemoglobin 11.6. Sodium 138, potassium 5.4. Creatinine 

is 1.25.  



ASSESSMENT:  

1. Possible bilateral interstitial lung disease, status post VATS and 

lung biopsy.  

2. Chest tube drainage on the right with continued air leak. 

3. Chronic obstructive pulmonary disease. 

4. History of nicotine dependence. 

5. Increased white count. 

6. Increased mean corpuscular volume. 

7. Hyperkalemia, mild. 

8. Renal failure with a creatinine of 1.25, possibly mild acute renal 

failure with prerenal factors.  

9. Increased random blood sugar. 

10. History of asthma, chronic, intermittent. 

11. History of gastroesophageal reflux disease. 

12. Hypertension, essential. 

13. Hyperlipidemia. 

14. History of degenerative joint disease. 

15. History of pneumonia. 

16. History of chronic hypoxic respiratory failure, on home oxygen 

nasal cannula.  

17. History of migraines. 

18. History of heart murmur. 

19. History of lymphedema. 

20. History of spinal stenosis and degenerative joint disease. 

21. History of perforated ulcer. 

22. History of urinary incontinence. 

23. History of cholecystectomy. 

24. History of depression. 

25. Remote history of nicotine dependence. 

26. Obesity with body mass index of 47.7. 



RECOMMENDATIONS AND DISCUSSION: In this 71-year-old woman who 

presented with multiple complex medical issues, we will monitor the 

patient closely, continue the current medications, continue 

symptomatic treatment. I recommend optimizing the bronchodilator 

treatment, DVT prophylaxis, incentive spirometry, chest tube 

monitoring. Steroids have been continued. Would also recommend 

resuming the home medications. Will follow the patient closely. The 

patient may be asked to follow up with Dr. Moreira closely after 

discharge. Thank you, Dr. Banerjee, for letting us participate in the 

care of this patient.  



I also recommend repeat labs in the morning to ensure continued 

improvement and normalcy.  



MTDD

## 2017-05-10 LAB
ANION GAP SERPL CALC-SCNC: 7 MMOL/L
BASOPHILS # BLD AUTO: 0.1 K/UL (ref 0–0.2)
BASOPHILS NFR BLD AUTO: 1 %
BUN SERPL-SCNC: 33 MG/DL (ref 7–17)
CALCIUM SPEC-MCNC: 8.7 MG/DL (ref 8.4–10.2)
CH: 31.4
CHCM: 30.9
CHLORIDE SERPL-SCNC: 104 MMOL/L (ref 98–107)
CO2 SERPL-SCNC: 28 MMOL/L (ref 22–30)
EOSINOPHIL # BLD AUTO: 0.4 K/UL (ref 0–0.7)
EOSINOPHIL NFR BLD AUTO: 3 %
ERYTHROCYTE [DISTWIDTH] IN BLOOD BY AUTOMATED COUNT: 3.59 M/UL (ref 3.8–5.4)
ERYTHROCYTE [DISTWIDTH] IN BLOOD: 15.1 % (ref 11.5–15.5)
GLUCOSE SERPL-MCNC: 118 MG/DL (ref 74–99)
HCT VFR BLD AUTO: 36.8 % (ref 34–46)
HDW: 2.62
HGB BLD-MCNC: 11.1 GM/DL (ref 11.4–16)
LUC NFR BLD AUTO: 3 %
LYMPHOCYTES # SPEC AUTO: 2.1 K/UL (ref 1–4.8)
LYMPHOCYTES NFR SPEC AUTO: 18 %
MCH RBC QN AUTO: 31 PG (ref 25–35)
MCHC RBC AUTO-ENTMCNC: 30.3 G/DL (ref 31–37)
MCV RBC AUTO: 102.2 FL (ref 80–100)
MONOCYTES # BLD AUTO: 0.9 K/UL (ref 0–1)
MONOCYTES NFR BLD AUTO: 7 %
NEUTROPHILS # BLD AUTO: 8.1 K/UL (ref 1.3–7.7)
NEUTROPHILS NFR BLD AUTO: 68 %
NON-AFRICAN AMERICAN GFR(MDRD): 50
POTASSIUM SERPL-SCNC: 5 MMOL/L (ref 3.5–5.1)
SODIUM SERPL-SCNC: 139 MMOL/L (ref 137–145)
WBC # BLD AUTO: 0.35 10*3/UL
WBC # BLD AUTO: 11.9 K/UL (ref 3.8–10.6)
WBC (PEROX): 12

## 2017-05-10 RX ADMIN — Medication SCH UNIT: at 12:21

## 2017-05-10 RX ADMIN — SODIUM CHLORIDE, PRESERVATIVE FREE SCH ML: 5 INJECTION INTRAVENOUS at 20:35

## 2017-05-10 RX ADMIN — HEPARIN SODIUM SCH UNIT: 5000 INJECTION, SOLUTION INTRAVENOUS; SUBCUTANEOUS at 08:58

## 2017-05-10 RX ADMIN — BUDESONIDE AND FORMOTEROL FUMARATE DIHYDRATE SCH PUFF: 160; 4.5 AEROSOL RESPIRATORY (INHALATION) at 19:49

## 2017-05-10 RX ADMIN — IPRATROPIUM BROMIDE AND ALBUTEROL SULFATE SCH ML: .5; 3 SOLUTION RESPIRATORY (INHALATION) at 19:49

## 2017-05-10 RX ADMIN — IPRATROPIUM BROMIDE AND ALBUTEROL SULFATE SCH ML: .5; 3 SOLUTION RESPIRATORY (INHALATION) at 16:21

## 2017-05-10 RX ADMIN — HYDROCODONE BITARTRATE AND ACETAMINOPHEN PRN EACH: 5; 325 TABLET ORAL at 09:08

## 2017-05-10 RX ADMIN — IPRATROPIUM BROMIDE AND ALBUTEROL SULFATE SCH ML: .5; 3 SOLUTION RESPIRATORY (INHALATION) at 08:59

## 2017-05-10 RX ADMIN — BUDESONIDE AND FORMOTEROL FUMARATE DIHYDRATE SCH PUFF: 160; 4.5 AEROSOL RESPIRATORY (INHALATION) at 08:58

## 2017-05-10 RX ADMIN — HEPARIN SODIUM SCH UNIT: 5000 INJECTION, SOLUTION INTRAVENOUS; SUBCUTANEOUS at 20:34

## 2017-05-10 RX ADMIN — PANTOPRAZOLE SODIUM SCH MG: 40 TABLET, DELAYED RELEASE ORAL at 07:22

## 2017-05-10 RX ADMIN — IPRATROPIUM BROMIDE AND ALBUTEROL SULFATE SCH ML: .5; 3 SOLUTION RESPIRATORY (INHALATION) at 12:36

## 2017-05-10 RX ADMIN — SODIUM CHLORIDE, PRESERVATIVE FREE SCH ML: 5 INJECTION INTRAVENOUS at 08:58

## 2017-05-10 RX ADMIN — HYDROCODONE BITARTRATE AND ACETAMINOPHEN PRN EACH: 5; 325 TABLET ORAL at 18:05

## 2017-05-10 NOTE — P.PN
<Cher Egan - Last Filed: 05/10/17 08:35>





Subjective


Principal diagnosis: 





Bilateral pulmonary infiltrates, COPD





POD #2 thorascopic right lung biopsy





Currently sitting up in bed in no apparent distress.  Does complain of pain at 

her chest tube site.  Anxious and would like her chest tube removed.





Objective





- Vital Signs


Vital signs: 


 Vital Signs











Temp  97.0 F L  05/10/17 04:00


 


Pulse  80   05/10/17 04:00


 


Resp  20   05/10/17 04:00


 


BP  154/72   05/10/17 04:00


 


Pulse Ox  93 L  05/10/17 04:00








 Intake & Output











 05/09/17 05/10/17 05/10/17





 18:59 06:59 18:59


 


Intake Total 624  


 


Output Total 60 900 


 


Balance 564 -900 


 


Weight  132.7 kg 


 


Intake:   


 


  IV 0  


 


    Dextrose 5%-0.45% NaCl 1, 0  





    000 ml @ 75 mls/hr IV .   





    [id number] [facility] Rx#:[id number]   


 


  Oral 624  


 


Output:   


 


  Chest Tube Drainage 60 60 


 


    Chest Tube Right Lateral 60 60 





    Chest   


 


  Drainage  40 


 


    Right Lower Chest  40 


 


  Urine  800 


 


Other:   


 


  Voiding Method Bedside Commode Bedside Commode 





 Incontinent Incontinent 


 


  # Voids  0 














- Constitutional


General appearance: Present: cooperative, no acute distress, obese





- Respiratory


Details: 





Lungs sounds diminished bilaterally, right greater than left.  Respirations even

, nonlabored.  Currently on 3 L nasal cannula with oxygen saturation 93%.  Able 

to achieve 750 mL on her incentive spirometry.  Right pleural chest tube to -20 

cm wall suction with 40 mL serosanguineous drainage overnight.  Positive air 

leak.  





- Cardiovascular


Details: 





S1, S2 present.  Regular rate and rhythm, normal sinus rhythm on telemetry.  

Bilateral lower extremity lymphedema present.  SCDs present.





- Gastrointestinal


Gastrointestinal Comment(s): 





Abdomen soft, nontender, nondistended, obese.  Active bowel sounds 4 

quadrants.  Tolerating diet.





- Genitourinary


Genitourinary Comment(s): 





Continues to void clear, yellow urine.





- Musculoskeletal


Musculoskeletal: Present: strength equal bilaterally





- Psychiatric


Psychiatric: Present: A&O x's 3, appropriate affect, intact judgment & insight





- Allied health notes


Allied health notes reviewed: nursing





- Labs


CBC & Chem 7: 


 05/10/17 05:45





 05/10/17 05:45


Labs: 


 Abnormal Lab Results - Last 24 Hours (Table)











  05/10/17 05/10/17 Range/Units





  05:45 05:45 


 


WBC  11.9 H   (3.8-10.6)  k/uL


 


RBC  3.59 L   (3.80-5.40)  m/uL


 


Hgb  11.1 L   (11.4-16.0)  gm/dL


 


MCV  102.2 H   (80.0-100.0)  fL


 


MCHC  30.3 L   (31.0-37.0)  g/dL


 


Neutrophils #  8.1 H   (1.3-7.7)  k/uL


 


BUN   33 H  (7-17)  mg/dL


 


Creatinine   1.07 H  (0.52-1.04)  mg/dL


 


Glucose   118 H  (74-99)  mg/dL








 Microbiology - Last 24 Hours (Table)











 05/08/17 10:10 Acid Fast Bacilli Smear - Final





 Lung - Right Lower Lobe Acid Fast Bacilli Culture - Preliminary


 


 05/08/17 10:00 Acid Fast Bacilli Smear - Final





 Lung - Right Upper Lobe Acid Fast Bacilli Culture - Preliminary


 


 05/08/17 10:05 Acid Fast Bacilli Smear - Final





 Lung - Right Acid Fast Bacilli Culture - Preliminary














- Imaging and Cardiology


Chest x-ray: image reviewed





Assessment and Plan


(1) Bilateral pulmonary infiltrates on CXR


Status: Acute   





(2) Acute exacerbation of chronic obstructive airways disease


Status: Acute   


Plan: 


Patient was seen and examined.  Right pleural chest tube with continued 

positive air leak.  Chest x-ray reviewed.  Will keep right pleural chest tube 

to wall suction.  Repeat chest x-ray in the morning.  Continue current 

medication regimen.  Encourage incentive spirometry use. Encourage ambulation 

in the room.  Patient needs aggressive reinforcement of incentive spirometry 

use and movement out of bed. 


Time with Patient: Greater than 30





<Cuong Rios - Last Filed: 05/11/17 18:50>





Objective





- Vital Signs


Vital signs: 


 Vital Signs











Temp  96.7 F L  05/11/17 16:00


 


Pulse  86   05/11/17 16:29


 


Resp  18   05/11/17 16:00


 


BP  138/61   05/11/17 16:00


 


Pulse Ox  90 L  05/11/17 16:00








 Intake & Output











 05/10/17 05/11/17 05/11/17





 18:59 06:59 18:59


 


Intake Total 826 600 485


 


Output Total 420 710 20


 


Balance 406 -110 465


 


Weight  131 kg 


 


Intake:   


 


  Oral 826 600 485


 


Output:   


 


  Chest Tube Drainage 70 110 20


 


    Chest Tube Right Lateral 70 110 20





    Chest   


 


  Urine 350 600 


 


Other:   


 


  Voiding Method Bedside Commode Bedside Commode Bedside Commode





 Incontinent Incontinent Incontinent


 


  # Voids 1 2 


 


  # Bowel Movements   2














- Labs


CBC & Chem 7: 


 05/11/17 07:12





 05/11/17 07:12


Labs: 


 Abnormal Lab Results - Last 24 Hours (Table)











  05/11/17 05/11/17 Range/Units





  07:12 07:12 


 


WBC  12.2 H   (3.8-10.6)  k/uL


 


RBC  3.65 L   (3.80-5.40)  m/uL


 


MCV  101.2 H   (80.0-100.0)  fL


 


Neutrophils #  8.9 H   (1.3-7.7)  k/uL


 


BUN   27 H  (7-17)  mg/dL


 


Glucose   137 H  (74-99)  mg/dL














Assessment and Plan


Plan: 


The patient was seen and examined.  I agree with the above assessment and plan.

  Her chest tube reveals an air leak on suction.  Her chest x-ray shows no 

obvious pneumothorax.  We will keep her on suction for now and plan on trying 

waterseal in the morning.

## 2017-05-10 NOTE — P.PN
Subjective


Principal diagnosis: 





Interstitial lung disease





This is a very pleasant 71-year-old female patient who follows with Dr. Chino 

in our office for chronic obstructive pulmonary disease, interstitial lung 

disease.  She had been having worsening symptoms despite all medical treatment.

  He had recommended a VATS procedure with biopsy to determine the cause of her 

interstitial lung disease.  He is suspicious for respiratory bronchiolitis 

interstitial lung disease, possible discriminative interstitial pneumonia.  She 

was admitted on 05/08/2017 for an elective VATS procedure with Dr. Banerjee.  

Biopsy results are still pending.  She is seen again today in follow-up on the 

selective care unit.  She is awake and alert in no acute distress.  Her right-

sided chest tube remains in place.  There is some noted atelectasis.  In taking 

and O2 saturations in the 90s on 3 L/m per nasal cannula.  She is afebrile.  No 

worsening shortness of breath, cough or congestion.





Objective





- Vital Signs


Vital signs: 


 Vital Signs











Temp  97.5 F L  05/10/17 11:55


 


Pulse  88   05/10/17 12:50


 


Resp  24   05/10/17 11:55


 


BP  115/81   05/10/17 11:55


 


Pulse Ox  91 L  05/10/17 11:55








 Intake & Output











 05/09/17 05/10/17 05/10/17





 18:59 06:59 18:59


 


Intake Total 624  590


 


Output Total 60 900 385


 


Balance 564 -900 205


 


Weight  132.7 kg 


 


Intake:   


 


  IV 0  


 


    Dextrose 5%-0.45% NaCl 1, 0  





    000 ml @ 75 mls/hr IV .   





    [id number] [facility] Rx#:[id number]   


 


  Oral 624  590


 


Output:   


 


  Chest Tube Drainage 60 60 35


 


    Chest Tube Right Lateral 60 60 35





    Chest   


 


  Drainage  40 


 


    Right Lower Chest  40 


 


  Urine  800 350


 


Other:   


 


  Voiding Method Bedside Commode Bedside Commode Bedside Commode





 Incontinent Incontinent Incontinent


 


  # Voids  0 1














- Exam





GENERAL EXAM: Alert, active, comfortable in no apparent distress.


HEAD: Normocephalic.


EYES: Normal reaction of pupils, equal size.


NOSE: Clear with pink turbinates.


THROAT: No erythema or exudates.


NECK: No masses, no JVD.


CHEST: No chest wall deformity.


LUNGS: Equal air entry with us in the right chest.  Right chest tube remains in 

place.


CVS: S1 and S2 normal with no audible mumurs, regular rhythm.


ABDOMEN: No hepatosplenomegaly, normal bowel sounds, no guarding or rigidity.


SPINE: No scoliosis or deformity


SKIN: No rashes


CENTRAL NERVOUS SYSTEM: No focal deficits, tone is normal in all 4 extremities.


Extremities: There is no significant peripheral edema.  No clubbing, no 

cyanosis.  Peripheral pulses are intact.





- Labs


CBC & Chem 7: 


 05/10/17 05:45





 05/10/17 05:45


Labs: 


 Abnormal Lab Results - Last 24 Hours (Table)











  05/10/17 05/10/17 Range/Units





  05:45 05:45 


 


WBC  11.9 H   (3.8-10.6)  k/uL


 


RBC  3.59 L   (3.80-5.40)  m/uL


 


Hgb  11.1 L   (11.4-16.0)  gm/dL


 


MCV  102.2 H   (80.0-100.0)  fL


 


MCHC  30.3 L   (31.0-37.0)  g/dL


 


Neutrophils #  8.1 H   (1.3-7.7)  k/uL


 


BUN   33 H  (7-17)  mg/dL


 


Creatinine   1.07 H  (0.52-1.04)  mg/dL


 


Glucose   118 H  (74-99)  mg/dL








 Microbiology - Last 24 Hours (Table)











 05/08/17 10:10 Acid Fast Bacilli Smear - Final





 Lung - Right Lower Lobe Acid Fast Bacilli Culture - Preliminary


 


 05/08/17 10:00 Acid Fast Bacilli Smear - Final





 Lung - Right Upper Lobe Acid Fast Bacilli Culture - Preliminary


 


 05/08/17 10:05 Acid Fast Bacilli Smear - Final





 Lung - Right Acid Fast Bacilli Culture - Preliminary














Assessment and Plan


Plan: 





Impression:





#1 Chronic interstitial lung disease status post VATS procedure, biopsy results 

are pending.





#2 Chronic obstructive pulmonary disease, currently inactive and stable.





#3 Chronic and ongoing tobacco dependence.





Plan:





The patient was seen and evaluated by Dr. Chino.  Her chest x-ray and labs were 

reviewed.  She is doing well from the pulmonary standpoint.  Hopefully the 

chest tube could be discontinued soon and we'll discharge the patient home.  

She will keep her scheduled office visit with Dr. Molina on the results of the 

biopsy will be reviewed at that time.  We'll continue with her current 

medications.  We'll increase her activity as tolerated.  We'll continue to 

follow.

## 2017-05-10 NOTE — PN
DATE OF SERVICE: 05/10/2017



This 71-year-old woman who was admitted after lung biopsy for possible 

interstitial lung disease had a chest tube drainage with some air 

leaks still. There is no history of fever, no history of headache, 

loss of consciousness, seizures. Patient is sitting up by the bedside. 

The patient has COPD, also. The patient is also hypoxic without 

oxygen. Improving significantly with a nasal cannula. On exam, alert 

and oriented x3. Pulse 96, blood pressure 115/81, respiration 24, 

temperature 97.5, pulse ox 91% on 3 L.  

HEENT: Conjunctivae normal. 

NECK: No jugular venous distention. 

CARDIOVASCULAR SYSTEM: S1, S2 muffled. 

RESPIRATORY SYSTEM: Breath sounds diminished at the bases. A few 

scattered rhonchi and crackles. 

ABDOMEN: Soft, non-tender. 

LEGS: No edema. No swelling. 

NERVOUS SYSTEM: No focal deficit. 



LABS: WBC 11.9, hemoglobin 11.1. Sodium 139, potassium 5. Creatinine 

is 1.07.  



ASSESSMENT: 

1. Bilateral interstitial lung disease possibly, status post VATS lung 

biopsy.  

2. Status post chest tube drainage on the right with a continued air 

leak.  

3. Chronic obstructive pulmonary disease history. 

4. History of nicotine dependence. 

5. History of increased white count. 

6. Increased mean corpuscular volume. 

7. Hyperkalemia, mild, secondary to acute renal failure. 

8. Acute renal failure with possibly prerenal factors; creatinine 1.25. 

9. Increased random blood sugar. 

10. History of asthma, chronic, intermittent. 

11. History of gastroesophageal reflux disease. 

12. Hypertension, essential. 

13. Hyperlipidemia. 

14. History of degenerative joint disease. 

15. History of pneumonia. 

16. History of chronic hypoxic respiratory failure, on home oxygen via 

nasal cannula.  

17. History of migraines. 

18. History of heart murmur. 

19. History of lymphedema. 

20. History of spinal stenosis and degenerative joint disease. 

21. History of perforated ulcer. 

22. History of urinary incontinence. 

23. History of cholecystectomy. 

24. History of depression. 

25. Remote history nicotine dependence. 

26. Obesity with body mass index of 47.7. 

27. FULL CODE. 



RECOMMENDATIONS AND DISCUSSION: In this 71-year-old woman who 

presented with multiple complex medical issues, we will monitor the 

patient closely, continue the current medications, bronchodilators, 

continue with incentive spirometry  DVT prophylaxis. Closely 

follow with Cardiothoracic Surgery. Further recommendations to follow. 

 



MTDD

## 2017-05-10 NOTE — XR
EXAMINATION TYPE: XR chest 1V portable

 

DATE OF EXAM: 5/10/2017 7:34 AM

 

COMPARISON: Prior chest x-ray May 9, 2017

 

HISTORY: Status post lung biopsy, chest tube

 

TECHNIQUE: Single frontal view of the chest is obtained.

 

FINDINGS:  Right-sided chest tube remains in place, there is subcutaneous emphysema. No sizable pneum
othorax or pleural effusion evident. Patchy atelectatic changes persist bilaterally. The heart is enl
arged. There are overlying cardiac leads.

 

IMPRESSION:  Similar findings to prior exam. Low lung volume, probable atelectasis, postop change.

## 2017-05-11 LAB
ANION GAP SERPL CALC-SCNC: 7 MMOL/L
BASOPHILS # BLD AUTO: 0.1 K/UL (ref 0–0.2)
BASOPHILS NFR BLD AUTO: 1 %
BUN SERPL-SCNC: 27 MG/DL (ref 7–17)
CALCIUM SPEC-MCNC: 8.8 MG/DL (ref 8.4–10.2)
CH: 31.3
CHCM: 31.2
CHLORIDE SERPL-SCNC: 102 MMOL/L (ref 98–107)
CO2 SERPL-SCNC: 30 MMOL/L (ref 22–30)
EOSINOPHIL # BLD AUTO: 0.4 K/UL (ref 0–0.7)
EOSINOPHIL NFR BLD AUTO: 3 %
ERYTHROCYTE [DISTWIDTH] IN BLOOD BY AUTOMATED COUNT: 3.65 M/UL (ref 3.8–5.4)
ERYTHROCYTE [DISTWIDTH] IN BLOOD: 15.1 % (ref 11.5–15.5)
GLUCOSE SERPL-MCNC: 137 MG/DL (ref 74–99)
HCT VFR BLD AUTO: 36.9 % (ref 34–46)
HDW: 2.6
HGB BLD-MCNC: 11.6 GM/DL (ref 11.4–16)
LUC NFR BLD AUTO: 2 %
LYMPHOCYTES # SPEC AUTO: 1.9 K/UL (ref 1–4.8)
LYMPHOCYTES NFR SPEC AUTO: 16 %
MCH RBC QN AUTO: 31.8 PG (ref 25–35)
MCHC RBC AUTO-ENTMCNC: 31.4 G/DL (ref 31–37)
MCV RBC AUTO: 101.2 FL (ref 80–100)
MONOCYTES # BLD AUTO: 0.7 K/UL (ref 0–1)
MONOCYTES NFR BLD AUTO: 6 %
NEUTROPHILS # BLD AUTO: 8.9 K/UL (ref 1.3–7.7)
NEUTROPHILS NFR BLD AUTO: 73 %
NON-AFRICAN AMERICAN GFR(MDRD): >60
POTASSIUM SERPL-SCNC: 4.6 MMOL/L (ref 3.5–5.1)
SODIUM SERPL-SCNC: 139 MMOL/L (ref 137–145)
WBC # BLD AUTO: 0.27 10*3/UL
WBC # BLD AUTO: 12.2 K/UL (ref 3.8–10.6)
WBC (PEROX): 13.26

## 2017-05-11 RX ADMIN — BUDESONIDE AND FORMOTEROL FUMARATE DIHYDRATE SCH: 160; 4.5 AEROSOL RESPIRATORY (INHALATION) at 20:29

## 2017-05-11 RX ADMIN — SODIUM CHLORIDE, PRESERVATIVE FREE SCH ML: 5 INJECTION INTRAVENOUS at 08:11

## 2017-05-11 RX ADMIN — DOCUSATE SODIUM AND SENNOSIDES SCH: 50; 8.6 TABLET ORAL at 21:00

## 2017-05-11 RX ADMIN — PANTOPRAZOLE SODIUM SCH MG: 40 TABLET, DELAYED RELEASE ORAL at 06:41

## 2017-05-11 RX ADMIN — IPRATROPIUM BROMIDE AND ALBUTEROL SULFATE SCH: .5; 3 SOLUTION RESPIRATORY (INHALATION) at 20:29

## 2017-05-11 RX ADMIN — HEPARIN SODIUM SCH UNIT: 5000 INJECTION, SOLUTION INTRAVENOUS; SUBCUTANEOUS at 21:00

## 2017-05-11 RX ADMIN — HEPARIN SODIUM SCH UNIT: 5000 INJECTION, SOLUTION INTRAVENOUS; SUBCUTANEOUS at 08:11

## 2017-05-11 RX ADMIN — IPRATROPIUM BROMIDE AND ALBUTEROL SULFATE SCH ML: .5; 3 SOLUTION RESPIRATORY (INHALATION) at 11:43

## 2017-05-11 RX ADMIN — IPRATROPIUM BROMIDE AND ALBUTEROL SULFATE SCH: .5; 3 SOLUTION RESPIRATORY (INHALATION) at 11:41

## 2017-05-11 RX ADMIN — HYDROCODONE BITARTRATE AND ACETAMINOPHEN PRN EACH: 5; 325 TABLET ORAL at 16:01

## 2017-05-11 RX ADMIN — Medication SCH UNIT: at 11:36

## 2017-05-11 RX ADMIN — IPRATROPIUM BROMIDE AND ALBUTEROL SULFATE SCH ML: .5; 3 SOLUTION RESPIRATORY (INHALATION) at 16:18

## 2017-05-11 RX ADMIN — SODIUM CHLORIDE, PRESERVATIVE FREE SCH ML: 5 INJECTION INTRAVENOUS at 21:00

## 2017-05-11 RX ADMIN — BUDESONIDE AND FORMOTEROL FUMARATE DIHYDRATE SCH: 160; 4.5 AEROSOL RESPIRATORY (INHALATION) at 11:41

## 2017-05-11 NOTE — XR
EXAMINATION TYPE: XR chest 1V portable

 

DATE OF EXAM: 5/11/2017 6:59 AM

 

COMPARISON: 5/10/2017

 

HISTORY: Postop

 

TECHNIQUE: Single frontal view of the chest is obtained.

 

FINDINGS:  There is now a small less than 10% right apical pneumothorax with subcutaneous emphysema. 
Chest tube stable. Bilateral areas of subsegmental consolidation. Limited inspiration.

 

IMPRESSION:  

1. Interval development of a less than 10% right apical pneumothorax.

## 2017-05-11 NOTE — PN
DATE OF SERVICE: 05/11/2017



This 71-year-old woman who was admitted with bilateral interstitial 

lung disease had VATS and lung biopsy. The patient had chest tube 

drainage. The patient still has some air leak which is also 

diminishing at this time. Biopsy report is still pending at this time. 

Incentive spirometry is continuing to be used. No chest pain or 

palpitation. No fever. On exam, alert and oriented x3. Pulse is 87, 

blood pressure 141/80, respiration 20, temperature 97 degrees, pulse 

ox 94% on 3 L.  

HEENT: Conjunctivae normal. 

NECK: No jugular venous distention. 

CARDIOVASCULAR SYSTEM: S1, S2 muffled. 

RESPIRATORY SYSTEM: Breath sounds diminished at the bases. A few 

scattered rhonchi. 

ABDOMEN: Soft, non-tender. 

LEGS: No edema. No swelling. 

NERVOUS SYSTEM: No focal deficit.

Status post chest tube drain on right. 



LABS: WBC 12.2. .2. 



ASSESSMENT: 

1. Bilateral interstitial lung disease possibly, status post VATS and 

lung biopsy.  

2. Status post chest tube drain on the right with continued air leak, 

improving. 

3. Chronic obstructive pulmonary disease history. 

4. History of nicotine dependence. 

5. Increased white count. 

6. Increased mean corpuscular volume. 

7. Hyperkalemia, mild, secondary to acute renal failure. 

8. Acute renal failure with possibly secondary prerenal factors with a 

creatinine of 1.25.  

9. Increased random blood sugar. 

10. History of asthma, chronic, intermittent. 

11. History of gastroesophageal reflux disease. 

12. Hypertension, essential. 

13. Hyperlipidemia. 

14. History of degenerative joint disease. 

15. History of pneumonia. 

16. History of chronic hypoxic respiratory failure, on home oxygen by 

nasal cannula.  

17. History of migraines. 

18. Heart murmur. 

19. History of lymphedema. 

20. History of spinal stenosis and degenerative joint disease. 

21. History of perforated ulcer. 

22. History of urinary incontinence. 

23. History of cholecystectomy. 

24. History of depression. 

25. Remote history of nicotine dependence. 

26. Obesity with body mass index of 47.7. 

27. FULL CODE. 



RECOMMENDATIONS AND DISCUSSION: In this 71-year-old woman who 

presented with multiple complex medical issues, we will monitor the 

patient closely, continue the current medications, continue 

symptomatic treatment. Otherwise, at this time I would recommend 

continuing with incentive spirometry. Repeat labs. Continue with 

bronchodilators. Further recommendations to follow.

## 2017-05-11 NOTE — P.PN
<Cher Egan - Last Filed: 05/11/17 08:52>





Subjective


Principal diagnosis: 





Bilateral pulmonary infiltrates, COPD





POD #3 thorascopic right lung biopsy





Currently sitting up in chair in mild distress.  Patient highly anxious.  Very 

concerned regarding chest tube.





Objective





- Vital Signs


Vital signs: 


 Vital Signs











Temp  96.7 F L  05/11/17 08:00


 


Pulse  95   05/11/17 08:00


 


Resp  20   05/11/17 08:00


 


BP  133/50   05/11/17 08:00


 


Pulse Ox  92 L  05/11/17 08:00








 Intake & Output











 05/10/17 05/11/17 05/11/17





 18:59 06:59 18:59


 


Intake Total 826 600 100


 


Output Total 420 710 10


 


Balance 406 -110 90


 


Weight  131 kg 


 


Intake:   


 


  Oral 826 600 100


 


Output:   


 


  Chest Tube Drainage 70 110 10


 


    Chest Tube Right Lateral 70 110 10





    Chest   


 


  Urine 350 600 


 


Other:   


 


  Voiding Method Bedside Commode Bedside Commode Bedside Commode





 Incontinent Incontinent Incontinent


 


  # Voids 1 2 














- Constitutional


General appearance: Present: cooperative, mild distress, morbidly obese





- Respiratory


Details: 





Lungs sounds diminished bilaterally.  Respirations even, nonlabored.  Currently 

on 6 L with oxygen saturation 92%.  Right pleural chest tube placed to 

waterseal this morning at 5 AM.  Chest x-ray reviewed.  Air leak still present 

however less than yesterday.  85 mL serous drainage in the last 24 hours.  

Patient able to achieve 1000 mL on incentive spirometry with a lot of 

encouragement.





- Cardiovascular


Details: 





S1, S2 present.  Regular rate and rhythm, normal sinus rhythm on telemetry.  

Lymphedema present bilateral lower extremity is.





- Gastrointestinal


Gastrointestinal Comment(s): 





Abdomen soft, nontender, nondistended, obese.  Active bowel sounds 4 

quadrants.  Tolerating diet.





- Genitourinary


Genitourinary Comment(s): 





Voiding, incontinence at times.





- Musculoskeletal


Musculoskeletal: Present: strength equal bilaterally





- Psychiatric


Psychiatric Comment(s): 





Patient is highly anxious requiring much reassurance.


Psychiatric: Present: A&O x's 3





- Allied health notes


Allied health notes reviewed: nursing





- Labs


CBC & Chem 7: 


 05/11/17 07:12





 05/11/17 07:12


Labs: 


 Abnormal Lab Results - Last 24 Hours (Table)











  05/11/17 05/11/17 Range/Units





  07:12 07:12 


 


WBC  12.2 H   (3.8-10.6)  k/uL


 


RBC  3.65 L   (3.80-5.40)  m/uL


 


MCV  101.2 H   (80.0-100.0)  fL


 


Neutrophils #  8.9 H   (1.3-7.7)  k/uL


 


BUN   27 H  (7-17)  mg/dL


 


Glucose   137 H  (74-99)  mg/dL














- Imaging and Cardiology


Chest x-ray: image reviewed





Assessment and Plan


(1) Bilateral pulmonary infiltrates on CXR


Status: Acute   





(2) Acute exacerbation of chronic obstructive airways disease


Status: Acute   


Plan: 


Patient was seen and examined.  Right pleural chest tube with continued 

positive air leak although diminishing.  Chest x-ray reviewed.  Chest tube 

placed to waterseal.  Repeat chest x-ray in the morning.  Continue current 

medication regimen.  Encourage incentive spirometry use. Encourage ambulation 

in the hallway.  Wean O2 as tolerated.  Patient needs aggressive reinforcement 

of incentive spirometry use and ambulation. 


Time with Patient: Greater than 30





<Cuong Rios - Last Filed: 05/11/17 18:49>





Objective





- Vital Signs


Vital signs: 


 Vital Signs











Temp  96.7 F L  05/11/17 16:00


 


Pulse  86   05/11/17 16:29


 


Resp  18   05/11/17 16:00


 


BP  138/61   05/11/17 16:00


 


Pulse Ox  90 L  05/11/17 16:00








 Intake & Output











 05/10/17 05/11/17 05/11/17





 18:59 06:59 18:59


 


Intake Total 826 600 485


 


Output Total 420 710 20


 


Balance 406 -110 465


 


Weight  131 kg 


 


Intake:   


 


  Oral 826 600 485


 


Output:   


 


  Chest Tube Drainage 70 110 20


 


    Chest Tube Right Lateral 70 110 20





    Chest   


 


  Urine 350 600 


 


Other:   


 


  Voiding Method Bedside Commode Bedside Commode Bedside Commode





 Incontinent Incontinent Incontinent


 


  # Voids 1 2 


 


  # Bowel Movements   2














- Labs


CBC & Chem 7: 


 05/11/17 07:12





 05/11/17 07:12


Labs: 


 Abnormal Lab Results - Last 24 Hours (Table)











  05/11/17 05/11/17 Range/Units





  07:12 07:12 


 


WBC  12.2 H   (3.8-10.6)  k/uL


 


RBC  3.65 L   (3.80-5.40)  m/uL


 


MCV  101.2 H   (80.0-100.0)  fL


 


Neutrophils #  8.9 H   (1.3-7.7)  k/uL


 


BUN   27 H  (7-17)  mg/dL


 


Glucose   137 H  (74-99)  mg/dL














Assessment and Plan


Plan: 


The patient was seen and examined.  I agree with the above assessment and plan.

  Her chest tube continues to have an air leak.  It was placed on waterseal 

this morning.  Follow-up chest x-ray does reveal a small apical space which is 

new.  We will keep it on water seal for now and repeat a chest x-ray in the 

morning.  Otherwise she'll continue with incentive spirometry and ambulation as 

tolerated.

## 2017-05-11 NOTE — P.PN
Subjective


Principal diagnosis: 





Interstitial lung disease





This is a very pleasant 71-year-old female patient who follows with Dr. Chino 

in our office for chronic obstructive pulmonary disease, interstitial lung 

disease.  She had been having worsening symptoms despite all medical treatment.

  He had recommended a VATS procedure with biopsy to determine the cause of her 

interstitial lung disease.  He is suspicious for respiratory bronchiolitis 

interstitial lung disease, possible discriminative interstitial pneumonia.  She 

was admitted on 05/08/2017 for an elective VATS procedure with Dr. Banerjee.  

Biopsy results are still pending.  She is seen again today in follow-up on the 

selective care unit.  She is awake and alert in no acute distress.  Her right-

sided chest tube remains in place.  There is some noted atelectasis.  In taking 

and O2 saturations in the 90s on 3 L/m per nasal cannula.  She is afebrile.  No 

worsening shortness of breath, cough or congestion.





The patient is seen again today 05/11/2017 in follow-up on the selective care 

unit.  She is awake and alert in no acute distress.  She is having issues with 

pain control following the VATS procedure on the right chest.  She is 

maintaining O2 saturations in the low 90s on 3 L/m per nasal cannula.  She is 

pulling approximately 1000 mL's on the incentive spirometer.  Chest x-ray 

reveals interval improvement of less than 10% right apical pneumothorax.  Chest 

tube remains in place. 





Objective





- Vital Signs


Vital signs: 


 Vital Signs











Temp  97.0 F L  05/11/17 11:50


 


Pulse  86   05/11/17 12:08


 


Resp  20   05/11/17 11:50


 


BP  141/82   05/11/17 11:50


 


Pulse Ox  94 L  05/11/17 11:50








 Intake & Output











 05/10/17 05/11/17 05/11/17





 18:59 06:59 18:59


 


Intake Total 826 600 305


 


Output Total 420 710 10


 


Balance 406 -110 295


 


Weight  131 kg 


 


Intake:   


 


  Oral 826 600 305


 


Output:   


 


  Chest Tube Drainage 70 110 10


 


    Chest Tube Right Lateral 70 110 10





    Chest   


 


  Urine 350 600 


 


Other:   


 


  Voiding Method Bedside Commode Bedside Commode Bedside Commode





 Incontinent Incontinent Incontinent


 


  # Voids 1 2 














- Exam





GENERAL EXAM: Alert, active, comfortable in no apparent distress.


HEAD: Normocephalic.


EYES: Normal reaction of pupils, equal size.


NOSE: Clear with pink turbinates.


THROAT: No erythema or exudates.


NECK: No masses, no JVD.


CHEST: No chest wall deformity.


LUNGS: Equal air entry with us in the right chest.  Right chest tube remains in 

place.


CVS: S1 and S2 normal with no audible mumurs, regular rhythm.


ABDOMEN: No hepatosplenomegaly, normal bowel sounds, no guarding or rigidity.


SPINE: No scoliosis or deformity


SKIN: No rashes


CENTRAL NERVOUS SYSTEM: No focal deficits, tone is normal in all 4 extremities.


Extremities: There is no significant peripheral edema.  No clubbing, no 

cyanosis.  Peripheral pulses are intact.





- Labs


CBC & Chem 7: 


 05/11/17 07:12





 05/11/17 07:12


Labs: 


 Abnormal Lab Results - Last 24 Hours (Table)











  05/11/17 05/11/17 Range/Units





  07:12 07:12 


 


WBC  12.2 H   (3.8-10.6)  k/uL


 


RBC  3.65 L   (3.80-5.40)  m/uL


 


MCV  101.2 H   (80.0-100.0)  fL


 


Neutrophils #  8.9 H   (1.3-7.7)  k/uL


 


BUN   27 H  (7-17)  mg/dL


 


Glucose   137 H  (74-99)  mg/dL














Assessment and Plan


Plan: 





Impression:





#1 Chronic interstitial lung disease status post VATS procedure, biopsy results 

are pending.





#2 Chronic obstructive pulmonary disease, currently inactive and stable.





#3 Chronic and ongoing tobacco dependence.





Plan:





The patient was seen and evaluated by Dr. Chino.  Her chest x-ray and labs were 

reviewed.  She is doing well from the pulmonary standpoint.  She is again 

encouraged regarding the increased use of the incentive spirometer and cough 

and deep breathing exercises.  We'll continue with her current medications 

including bronchodilators and Symbicort.  We'll increase her activity as 

tolerated.  We'll continue to follow.

## 2017-05-12 LAB
ANION GAP SERPL CALC-SCNC: 8 MMOL/L
BASOPHILS # BLD AUTO: 0.1 K/UL (ref 0–0.2)
BASOPHILS NFR BLD AUTO: 1 %
BUN SERPL-SCNC: 29 MG/DL (ref 7–17)
CALCIUM SPEC-MCNC: 8.8 MG/DL (ref 8.4–10.2)
CH: 31.6
CHCM: 31.2
CHLORIDE SERPL-SCNC: 105 MMOL/L (ref 98–107)
CO2 SERPL-SCNC: 27 MMOL/L (ref 22–30)
EOSINOPHIL # BLD AUTO: 0.3 K/UL (ref 0–0.7)
EOSINOPHIL NFR BLD AUTO: 2 %
ERYTHROCYTE [DISTWIDTH] IN BLOOD BY AUTOMATED COUNT: 3.75 M/UL (ref 3.8–5.4)
ERYTHROCYTE [DISTWIDTH] IN BLOOD: 14.9 % (ref 11.5–15.5)
GLUCOSE SERPL-MCNC: 129 MG/DL (ref 74–99)
HCT VFR BLD AUTO: 38.2 % (ref 34–46)
HDW: 2.69
HGB BLD-MCNC: 11.7 GM/DL (ref 11.4–16)
LUC NFR BLD AUTO: 2 %
LYMPHOCYTES # SPEC AUTO: 1.3 K/UL (ref 1–4.8)
LYMPHOCYTES NFR SPEC AUTO: 11 %
MCH RBC QN AUTO: 31.3 PG (ref 25–35)
MCHC RBC AUTO-ENTMCNC: 30.8 G/DL (ref 31–37)
MCV RBC AUTO: 101.9 FL (ref 80–100)
MONOCYTES # BLD AUTO: 0.8 K/UL (ref 0–1)
MONOCYTES NFR BLD AUTO: 7 %
NEUTROPHILS # BLD AUTO: 9.1 K/UL (ref 1.3–7.7)
NEUTROPHILS NFR BLD AUTO: 77 %
NON-AFRICAN AMERICAN GFR(MDRD): >60
POTASSIUM SERPL-SCNC: 4.9 MMOL/L (ref 3.5–5.1)
SODIUM SERPL-SCNC: 140 MMOL/L (ref 137–145)
WBC # BLD AUTO: 0.27 10*3/UL
WBC # BLD AUTO: 11.8 K/UL (ref 3.8–10.6)
WBC (PEROX): 11.94

## 2017-05-12 RX ADMIN — PANTOPRAZOLE SODIUM SCH MG: 40 TABLET, DELAYED RELEASE ORAL at 06:38

## 2017-05-12 RX ADMIN — SODIUM CHLORIDE, PRESERVATIVE FREE SCH ML: 5 INJECTION INTRAVENOUS at 21:18

## 2017-05-12 RX ADMIN — HEPARIN SODIUM SCH UNIT: 5000 INJECTION, SOLUTION INTRAVENOUS; SUBCUTANEOUS at 09:07

## 2017-05-12 RX ADMIN — DOCUSATE SODIUM AND SENNOSIDES SCH EACH: 50; 8.6 TABLET ORAL at 21:18

## 2017-05-12 RX ADMIN — Medication SCH UNIT: at 09:08

## 2017-05-12 RX ADMIN — HEPARIN SODIUM SCH UNIT: 5000 INJECTION, SOLUTION INTRAVENOUS; SUBCUTANEOUS at 21:17

## 2017-05-12 RX ADMIN — BUDESONIDE AND FORMOTEROL FUMARATE DIHYDRATE SCH PUFF: 160; 4.5 AEROSOL RESPIRATORY (INHALATION) at 09:07

## 2017-05-12 RX ADMIN — HYDROCODONE BITARTRATE AND ACETAMINOPHEN PRN EACH: 5; 325 TABLET ORAL at 14:43

## 2017-05-12 RX ADMIN — HYDROCODONE BITARTRATE AND ACETAMINOPHEN PRN EACH: 5; 325 TABLET ORAL at 01:17

## 2017-05-12 RX ADMIN — IPRATROPIUM BROMIDE AND ALBUTEROL SULFATE SCH ML: .5; 3 SOLUTION RESPIRATORY (INHALATION) at 09:06

## 2017-05-12 RX ADMIN — IPRATROPIUM BROMIDE AND ALBUTEROL SULFATE SCH: .5; 3 SOLUTION RESPIRATORY (INHALATION) at 17:30

## 2017-05-12 RX ADMIN — SODIUM CHLORIDE, PRESERVATIVE FREE SCH ML: 5 INJECTION INTRAVENOUS at 09:07

## 2017-05-12 RX ADMIN — BUDESONIDE AND FORMOTEROL FUMARATE DIHYDRATE SCH PUFF: 160; 4.5 AEROSOL RESPIRATORY (INHALATION) at 20:41

## 2017-05-12 RX ADMIN — IPRATROPIUM BROMIDE AND ALBUTEROL SULFATE SCH: .5; 3 SOLUTION RESPIRATORY (INHALATION) at 14:13

## 2017-05-12 RX ADMIN — DOCUSATE SODIUM AND SENNOSIDES SCH EACH: 50; 8.6 TABLET ORAL at 09:07

## 2017-05-12 RX ADMIN — IPRATROPIUM BROMIDE AND ALBUTEROL SULFATE SCH ML: .5; 3 SOLUTION RESPIRATORY (INHALATION) at 20:32

## 2017-05-12 NOTE — P.PN
Subjective


Principal diagnosis: 





Interstitial lung disease





This is a very pleasant 71-year-old female patient who follows with Dr. Chino 

in our office for chronic obstructive pulmonary disease, interstitial lung 

disease.  She had been having worsening symptoms despite all medical treatment.

  He had recommended a VATS procedure with biopsy to determine the cause of her 

interstitial lung disease.  He is suspicious for respiratory bronchiolitis 

interstitial lung disease, possible discriminative interstitial pneumonia.  She 

was admitted on 05/08/2017 for an elective VATS procedure with Dr. Banerjee.  

Biopsy results are still pending.  She is seen again today in follow-up on the 

selective care unit.  She is awake and alert in no acute distress.  Her right-

sided chest tube remains in place.  There is some noted atelectasis.  In taking 

and O2 saturations in the 90s on 3 L/m per nasal cannula.  She is afebrile.  No 

worsening shortness of breath, cough or congestion.





The patient is seen again today 05/11/2017 in follow-up on the selective care 

unit.  She is awake and alert in no acute distress.  She is having issues with 

pain control following the VATS procedure on the right chest.  She is 

maintaining O2 saturations in the low 90s on 3 L/m per nasal cannula.  She is 

pulling approximately 1000 mL's on the incentive spirometer.  Chest x-ray 

reveals interval improvement of less than 10% right apical pneumothorax.  Chest 

tube remains in place. 





The patient is seen again today 05/12/2017 in follow-up on the selective care 

unit.  She is currently sitting up in a chair at the bedside.  She denies any 

worsening shortness of breath, cough or congestion.  Her right sided surgical 

site pain is well controlled.  Chest tube remains in place.  A small leak 

continues.  HEENT any good O2 saturations in the upper 90s on 3 L/m per nasal 

cannula.





Objective





- Vital Signs


Vital signs: 


 Vital Signs











Temp  97 F L  05/12/17 08:00


 


Pulse  84   05/12/17 09:21


 


Resp  18   05/12/17 09:07


 


BP  126/46   05/12/17 08:00


 


Pulse Ox  98   05/12/17 08:00








 Intake & Output











 05/11/17 05/12/17 05/12/17





 18:59 06:59 18:59


 


Intake Total 485  


 


Output Total 20 900 


 


Balance 465 -900 


 


Weight  129.9 kg 


 


Intake:   


 


  Oral 485  


 


Output:   


 


  Chest Tube Drainage 20  


 


    Chest Tube Right Lateral 20  





    Chest   


 


  Urine  900 


 


Other:   


 


  Voiding Method Bedside Commode Bedside Commode Bedside Commode





 Incontinent Incontinent Incontinent


 


  # Voids  4 


 


  # Bowel Movements 2  














- Exam





GENERAL EXAM: Alert, active, comfortable in no apparent distress.


HEAD: Normocephalic.


EYES: Normal reaction of pupils, equal size.


NOSE: Clear with pink turbinates.


THROAT: No erythema or exudates.


NECK: No masses, no JVD.


CHEST: No chest wall deformity.


LUNGS: Equal air entry with us in the right chest.  Right chest tube remains in 

place.


CVS: S1 and S2 normal with no audible mumurs, regular rhythm.


ABDOMEN: No hepatosplenomegaly, normal bowel sounds, no guarding or rigidity.


SPINE: No scoliosis or deformity


SKIN: No rashes


CENTRAL NERVOUS SYSTEM: No focal deficits, tone is normal in all 4 extremities.


Extremities: There is no significant peripheral edema.  No clubbing, no 

cyanosis.  Peripheral pulses are intact.





- Labs


CBC & Chem 7: 


 05/12/17 05:35





 05/12/17 05:35


Labs: 


 Abnormal Lab Results - Last 24 Hours (Table)











  05/12/17 05/12/17 Range/Units





  05:35 05:35 


 


WBC  11.8 H   (3.8-10.6)  k/uL


 


RBC  3.75 L   (3.80-5.40)  m/uL


 


MCV  101.9 H   (80.0-100.0)  fL


 


MCHC  30.8 L   (31.0-37.0)  g/dL


 


Neutrophils #  9.1 H   (1.3-7.7)  k/uL


 


BUN   29 H  (7-17)  mg/dL


 


Glucose   129 H  (74-99)  mg/dL














Assessment and Plan


Plan: 





Impression:





#1 Chronic interstitial lung disease status post VATS procedure, biopsy results 

are pending.





#2 Chronic obstructive pulmonary disease, currently inactive and stable.





#3 Chronic and ongoing tobacco dependence.





Plan:





The patient was seen and evaluated by Dr. Chino.  Her chest x-ray was reviewed.

  She is doing well from the pulmonary standpoint.  She is again encouraged 

regarding the increased use of the incentive spirometer and cough and deep 

breathing exercises.  We'll continue with her current medications.  We'll 

increase her activity as tolerated.  We'll continue to follow.

## 2017-05-12 NOTE — P.PN
<Jad Pantoja - Last Filed: 05/12/17 10:48>





Progress Note - Text





CV Surgery Nursing





Principal diagnosis: 





Bilateral pulmonary infiltrates, COPD





POD #4 thorascopic right lung biopsy.





Patient awake and alert, no distress noted, no specific complaints.  She is 

sitting up to the bedside chair.





Vital Signs: Afebrile


 Vital Signs - 24 hr











  05/11/17 05/11/17 05/11/17





  11:35 11:43 11:50


 


Temperature   97.0 F L


 


Pulse Rate  84 


 


Pulse Rate [ 95  87





Pulse Oximetery   





]   


 


Respiratory 20 20 20





Rate   


 


Blood Pressure   141/82





[Supine]   


 


O2 Sat by Pulse   94 L





Oximetry   














  05/11/17 05/11/17 05/11/17





  12:08 16:00 16:20


 


Temperature  96.7 F L 


 


Pulse Rate 86  85


 


Pulse Rate [  90 





Pulse Oximetery   





]   


 


Respiratory  18 





Rate   


 


Blood Pressure  138/61 





[Supine]   


 


O2 Sat by Pulse  90 L 





Oximetry   














  05/11/17 05/11/17 05/12/17





  16:29 20:00 00:00


 


Temperature  97.9 F 


 


Pulse Rate 86  


 


Pulse Rate [  76 77





Pulse Oximetery   





]   


 


Respiratory  18 20





Rate   


 


Blood Pressure  143/79 158/88





[Supine]   


 


O2 Sat by Pulse  94 L 96





Oximetry   














  05/12/17 05/12/17 05/12/17





  04:00 08:00 09:07


 


Temperature  97 F L 


 


Pulse Rate   86


 


Pulse Rate [ 84 86 





Pulse Oximetery   





]   


 


Respiratory 18 18 18





Rate   


 


Blood Pressure 141/76 126/46 





[Supine]   


 


O2 Sat by Pulse 92 L 98 





Oximetry   














  05/12/17





  09:21


 


Temperature 


 


Pulse Rate 84


 


Pulse Rate [ 





Pulse Oximetery 





] 


 


Respiratory 





Rate 


 


Blood Pressure 





[Supine] 


 


O2 Sat by Pulse 





Oximetry 














Labs: 


 Short CBC











  05/12/17 Range/Units





  05:35 


 


WBC  11.8 H  (3.8-10.6)  k/uL


 


Hgb  11.7  (11.4-16.0)  gm/dL


 


Hct  38.2  (34.0-46.0)  %


 


Plt Count  220  (150-450)  k/uL


 


Neutrophils #  9.1 H  (1.3-7.7)  k/uL








 BMP











  05/12/17





  05:35


 


Sodium  140


 


Potassium  4.9


 


Chloride  105


 


Carbon Dioxide  27


 


BUN  29 H


 


Creatinine  0.90


 


Glucose  129 H


 


Calcium  8.8














Lungs: Essentially clear throughout, they are diminished to bilateral bases.  

Respirations are symmetrical and unlabored.





O2 sat: 98% on 3 L nasal cannula.





I/S: 1000 mL, reviewed with the patient important of using her incentive 

spirometry every hour while awake.  The patient did give a good demonstration 

on her incentive spirometry but will need further coaching and encouragement.





Heart:  S1S2, regular rhythm and rate, negative for S3, gallop or murmur.  

Remote telemetry showing normal sinus rhythm heart rate 65.





Right lateral chest incisions clean dry and well approximated.  No drainage 

noted.  Some ecchymosis noted surrounding her incisions.





+2 edema noted to her bilateral lower extremities, the patient states the she 

has had this edema for several years.  Sequential compression devices in place 

to her bilateral lower extremities.





Abdomen:  Soft, Positive bowel sounds present in all 4 quadrants.  Last bowel 

movement on 05/11/2017.





U/O:  Adequate.  Occasional episodes of incontinence.





Chest Tubes: Right pleural chest tube with intermittent air leak.  It remains 

to waterseal, wall suction was discontinued yesterday.  It is draining thin 

serous drainage.  100 mL output in the last 24 hours.





24 hr Total: 


 Intake & Output











 05/10/17 05/11/17 05/12/17 05/13/17





 06:59 06:59 06:59 06:59


 


Intake Total 624 1426 485 


 


Output Total 960 1130 920 


 


Balance -336 296 -435 


 


Weight 132.7 kg 131 kg 129.9 kg 








Active Medications





Hydrocodone Bitart/Acetaminophen (Norco 5-325)  1 each PO Q4HR PRN


   PRN Reason: Pain 1-5


   Last Admin: 05/12/17 01:17 Dose:  1 each


Hydrocodone Bitart/Acetaminophen (Norco 5-325)  2 each PO Q4HR PRN


   PRN Reason: Pain 6-10


   Last Admin: 05/11/17 16:01 Dose:  2 each


Albuterol/Ipratropium (Duoneb 0.5 Mg-3 Mg/3 Ml Soln)  3 ml IH RT-Q1H PRN


   PRN Reason: Shortness Of Breath Or Wheezing


Albuterol/Ipratropium (Duoneb 0.5 Mg-3 Mg/3 Ml Soln)  3 ml IH RT-QID NED


   Last Admin: 05/12/17 09:06 Dose:  3 ml


Alprazolam (Xanax)  0.25 mg PO BID PRN


   PRN Reason: Anxiety


   Last Admin: 05/12/17 01:23 Dose:  0.25 mg


Benzocaine/Menthol (Cepacol Lozenge)  1 each MUCOUS MEM Q4HR PRN


   PRN Reason: SORE THROAT


   Last Admin: 05/11/17 16:16 Dose:  1 each


Bisacodyl (Dulcolax)  10 mg RECTAL DAILY PRN


   PRN Reason: Constipation


Budesonide/Formoterol Fumarate (Symbicort 160-4.5 Mcg Inhaler)  2 puff 

INHALATION RT-BID [facility]


   Last Admin: 05/12/17 09:07 Dose:  2 puff


Cholecalciferol (Vitamin D3)  1,000 unit PO DAILY@1200 [facility]


   Last Admin: 05/12/17 09:08 Dose:  1,000 unit


Heparin Sodium (Porcine) (Heparin)  5,000 unit SQ Q12HR [facility]


   Last Admin: 05/12/17 09:07 Dose:  5,000 unit


Ondansetron HCl (Zofran)  4 mg IVP Q8HR PRN


   PRN Reason: Nausea And Vomiting


Pantoprazole Sodium (Protonix)  40 mg PO [id number] [facility]


   Last Admin: 05/12/17 06:38 Dose:  40 mg


Prednisone ()  10 mg PO DAILY [facility]


   Last Admin: 05/12/17 09:07 Dose:  10 mg


Ropinirole HCl (Requip)  0.5 mg PO HS [facility]


   Last Admin: 05/11/17 21:00 Dose:  0.5 mg


Senna/Docusate Sodium (Senokot-S)  1 each PO BID [facility]


   Last Admin: 05/12/17 09:07 Dose:  1 each


Sodium Chloride (Saline Flush)  10 ml IV BID [facility]


   Last Admin: 05/12/17 09:07 Dose:  10 ml





Plan:





1.  Keep chest tube in place for now as it has an intermittent air leak.  We 

will keep the chest tube to water seal at this time.


2.  Repeat chest x-ray in the a.m.


3.  Continue to encourage use of her incentive spirometry every hour while 

awake.


4.  DVT and GI prophylaxis in place.


5.  Wean oxygen as tolerated, Dr. Chino following for her pulmonary management.


6.  Increase activity as tolerated, physical therapy and occupational therapy 

following.





<Cuong Rios - Last Filed: 05/12/17 15:14>





Progress Note - Text


The patient was seen and examined.  I agree with the above assessment and plan.

  She continues to have an intermittent air leak from her chest tube.  We will 

keep her on waterseal for now.  Her chest x-ray has a small apical space which 

is improved compared to yesterday.  We will encourage incentive spirometry and 

ambulation.  Wean her oxygen as tolerated.

## 2017-05-12 NOTE — PN
DATE OF SERVICE: 05/12/2017



This 71-year-old woman who was admitted with bilateral interstitial 

lung disease and VATS had a chest tube drain also. Patient still has 

some intermittent leak. Patient is using incentive spirometry. No 

chest pain. No palpitation. No fever. On exam, alert and oriented x3. 

Pulse is 104, blood pressure 130/67, respiration 19, temperature 97.1, 

pulse ox 94% on 3 L. 

HEENT: Conjunctivae normal. 

NECK: No jugular venous distention. 

CARDIOVASCULAR SYSTEM: S1, S2 muffled. 

RESPIRATORY SYSTEM: Breath sounds diminished at the bases. Scattered 

rhonchi and crackles. Expiratory wheezing also present.  

ABDOMEN: Soft, non-tender. 

LEGS: No edema. No swelling. 

NERVOUS SYSTEM: No focal deficit. 



LABS: WBC 11.8. Hemoglobin 11.7. Sodium 140. 



ASSESSMENT:  

1. Bilateral interstitial lung disease possibly, status post VATS and 

lung biopsy.  

2. Status post chest tube drain on the right with continued air leak, 

improving.  

3. Chronic obstructive pulmonary disease history. 

4. History of nicotine dependence. 

5. Increased white count. 

6. Increased mean corpuscular volume. 

7. Hyperkalemia, mild, secondary to acute renal failure. 

8. Acute renal failure possibly secondary to prerenal factors with a 

creatinine of 1.25.  

9. Increased random blood sugar. 

10. History of asthma, chronic, intermittent. 

11. History of gastroesophageal reflux disease. 

12. Hypertension, essential. 

13. Hyperlipidemia. 

14. History of degenerative joint disease. 

15. History of pneumonia. 

16. History of chronic hypoxic respiratory failure, on home oxygen by 

nasal cannula.  

17. History of migraine. 

18. History of lymphedema. 

19. History of spinal stenosis and degenerative joint disease. 

20. History of perforated ulcer. 

21. History of urinary incontinence. 

22. History of cholecystectomy. 

23. History of depression. 

24. Remote history of nicotine dependence. 

25. Obesity; body mass index of 47.7. 

26. FULL CODE.



RECOMMENDATIONS AND DISCUSSION: I recommend to continue with the 

current medications, continue with the monitoring, symptomatic 

treatment.  Otherwise, closely follow with  recommendations. 

Incentive spirometry. Further recommendations to follow.  



MTDD

## 2017-05-13 LAB
ANION GAP SERPL CALC-SCNC: 6 MMOL/L
BASOPHILS # BLD AUTO: 0.1 K/UL (ref 0–0.2)
BASOPHILS NFR BLD AUTO: 0 %
BUN SERPL-SCNC: 25 MG/DL (ref 7–17)
CALCIUM SPEC-MCNC: 8.8 MG/DL (ref 8.4–10.2)
CH: 31.8
CHCM: 32
CHLORIDE SERPL-SCNC: 101 MMOL/L (ref 98–107)
CO2 SERPL-SCNC: 32 MMOL/L (ref 22–30)
EOSINOPHIL # BLD AUTO: 0.3 K/UL (ref 0–0.7)
EOSINOPHIL NFR BLD AUTO: 3 %
ERYTHROCYTE [DISTWIDTH] IN BLOOD BY AUTOMATED COUNT: 3.73 M/UL (ref 3.8–5.4)
ERYTHROCYTE [DISTWIDTH] IN BLOOD: 14.8 % (ref 11.5–15.5)
GLUCOSE SERPL-MCNC: 133 MG/DL (ref 74–99)
HCT VFR BLD AUTO: 37.2 % (ref 34–46)
HDW: 2.65
HGB BLD-MCNC: 11.8 GM/DL (ref 11.4–16)
LUC NFR BLD AUTO: 3 %
LYMPHOCYTES # SPEC AUTO: 1.6 K/UL (ref 1–4.8)
LYMPHOCYTES NFR SPEC AUTO: 15 %
MCH RBC QN AUTO: 31.7 PG (ref 25–35)
MCHC RBC AUTO-ENTMCNC: 31.8 G/DL (ref 31–37)
MCV RBC AUTO: 99.9 FL (ref 80–100)
MONOCYTES # BLD AUTO: 0.7 K/UL (ref 0–1)
MONOCYTES NFR BLD AUTO: 7 %
NEUTROPHILS # BLD AUTO: 7.8 K/UL (ref 1.3–7.7)
NEUTROPHILS NFR BLD AUTO: 72 %
NON-AFRICAN AMERICAN GFR(MDRD): >60
POTASSIUM SERPL-SCNC: 4.6 MMOL/L (ref 3.5–5.1)
SODIUM SERPL-SCNC: 139 MMOL/L (ref 137–145)
WBC # BLD AUTO: 0.29 10*3/UL
WBC # BLD AUTO: 10.8 K/UL (ref 3.8–10.6)
WBC (PEROX): 11.05

## 2017-05-13 RX ADMIN — Medication SCH UNIT: at 12:14

## 2017-05-13 RX ADMIN — HYDROCODONE BITARTRATE AND ACETAMINOPHEN PRN EACH: 5; 325 TABLET ORAL at 23:42

## 2017-05-13 RX ADMIN — SODIUM CHLORIDE, PRESERVATIVE FREE SCH ML: 5 INJECTION INTRAVENOUS at 08:57

## 2017-05-13 RX ADMIN — IPRATROPIUM BROMIDE AND ALBUTEROL SULFATE SCH ML: .5; 3 SOLUTION RESPIRATORY (INHALATION) at 08:09

## 2017-05-13 RX ADMIN — IPRATROPIUM BROMIDE AND ALBUTEROL SULFATE SCH ML: .5; 3 SOLUTION RESPIRATORY (INHALATION) at 19:20

## 2017-05-13 RX ADMIN — HYDROCODONE BITARTRATE AND ACETAMINOPHEN PRN EACH: 5; 325 TABLET ORAL at 10:25

## 2017-05-13 RX ADMIN — DOCUSATE SODIUM AND SENNOSIDES SCH EACH: 50; 8.6 TABLET ORAL at 08:59

## 2017-05-13 RX ADMIN — BUDESONIDE AND FORMOTEROL FUMARATE DIHYDRATE SCH PUFF: 160; 4.5 AEROSOL RESPIRATORY (INHALATION) at 08:09

## 2017-05-13 RX ADMIN — IPRATROPIUM BROMIDE AND ALBUTEROL SULFATE SCH ML: .5; 3 SOLUTION RESPIRATORY (INHALATION) at 15:14

## 2017-05-13 RX ADMIN — BUDESONIDE AND FORMOTEROL FUMARATE DIHYDRATE SCH PUFF: 160; 4.5 AEROSOL RESPIRATORY (INHALATION) at 19:19

## 2017-05-13 RX ADMIN — HEPARIN SODIUM SCH UNIT: 5000 INJECTION, SOLUTION INTRAVENOUS; SUBCUTANEOUS at 08:57

## 2017-05-13 RX ADMIN — HEPARIN SODIUM SCH UNIT: 5000 INJECTION, SOLUTION INTRAVENOUS; SUBCUTANEOUS at 15:44

## 2017-05-13 RX ADMIN — IPRATROPIUM BROMIDE AND ALBUTEROL SULFATE SCH ML: .5; 3 SOLUTION RESPIRATORY (INHALATION) at 11:27

## 2017-05-13 RX ADMIN — PANTOPRAZOLE SODIUM SCH MG: 40 TABLET, DELAYED RELEASE ORAL at 06:33

## 2017-05-13 NOTE — P.PN
Subjective


Principal diagnosis: 





Interstitial lung disease





This is a very pleasant 71-year-old female patient who follows with Dr. Chino 

in our office for chronic obstructive pulmonary disease, interstitial lung 

disease.  She had been having worsening symptoms despite all medical treatment.

  He had recommended a VATS procedure with biopsy to determine the cause of her 

interstitial lung disease.  He is suspicious for respiratory bronchiolitis 

interstitial lung disease, possible discriminative interstitial pneumonia.  She 

was admitted on 05/08/2017 for an elective VATS procedure with Dr. Banerjee.  

Biopsy results are still pending.  She is seen again today in follow-up on the 

selective care unit.  She is awake and alert in no acute distress.  Her right-

sided chest tube remains in place.  There is some noted atelectasis.  In taking 

and O2 saturations in the 90s on 3 L/m per nasal cannula.  She is afebrile.  No 

worsening shortness of breath, cough or congestion.





The patient is seen again today 05/11/2017 in follow-up on the selective care 

unit.  She is awake and alert in no acute distress.  She is having issues with 

pain control following the VATS procedure on the right chest.  She is 

maintaining O2 saturations in the low 90s on 3 L/m per nasal cannula.  She is 

pulling approximately 1000 mL's on the incentive spirometer.  Chest x-ray 

reveals interval improvement of less than 10% right apical pneumothorax.  Chest 

tube remains in place. 





The patient is seen again today 05/12/2017 in follow-up on the selective care 

unit.  She is currently sitting up in a chair at the bedside.  She denies any 

worsening shortness of breath, cough or congestion.  Her right sided surgical 

site pain is well controlled.  Chest tube remains in place.  A small leak 

continues.  HEENT any good O2 saturations in the upper 90s on 3 L/m per nasal 

cannula.





Seen again today 05/13/2017 in follow-up on the selective care unit.  She is 

awake and alert in no acute distress.  She is currently sitting up in the chair 

at the bedside.  Her pain is well controlled.  She is working well of the 

incentive spirometer.  Chest tube remains in place.  X-ray remains stable.  No 

sizable pneumothorax.





Objective





- Vital Signs


Vital signs: 


 Vital Signs











Temp  97.3 F L  05/13/17 08:00


 


Pulse  96   05/13/17 08:28


 


Resp  20   05/13/17 08:00


 


BP  121/56   05/13/17 08:00


 


Pulse Ox  93 L  05/13/17 08:12








 Intake & Output











 05/12/17 05/13/17 05/13/17





 18:59 06:59 18:59


 


Intake Total 720  0


 


Output Total 30 640 


 


Balance 690 -640 0


 


Weight  130.6 kg 


 


Intake:   


 


  Oral 720  0


 


Output:   


 


  Chest Tube Drainage 30 40 


 


    Right Lateral Chest 30 40 


 


  Urine  600 


 


Other:   


 


  Voiding Method Bedside Commode Bedside Commode Bedside Commode





 Incontinent  


 


  # Bowel Movements  1 














- Exam





GENERAL EXAM: Alert, active, comfortable in no apparent distress.


HEAD: Normocephalic.


EYES: Normal reaction of pupils, equal size.


NOSE: Clear with pink turbinates.


THROAT: No erythema or exudates.


NECK: No masses, no JVD.


CHEST: No chest wall deformity.


LUNGS: Equal air entry with us in the right chest.  Right chest tube remains in 

place.


CVS: S1 and S2 normal with no audible mumurs, regular rhythm.


ABDOMEN: No hepatosplenomegaly, normal bowel sounds, no guarding or rigidity.


SPINE: No scoliosis or deformity


SKIN: No rashes


CENTRAL NERVOUS SYSTEM: No focal deficits, tone is normal in all 4 extremities.


Extremities: There is no significant peripheral edema.  No clubbing, no 

cyanosis.  Peripheral pulses are intact.





- Labs


CBC & Chem 7: 


 05/13/17 06:02





 05/13/17 06:02


Labs: 


 Abnormal Lab Results - Last 24 Hours (Table)











  05/13/17 05/13/17 Range/Units





  06:02 06:02 


 


WBC  10.8 H   (3.8-10.6)  k/uL


 


RBC  3.73 L   (3.80-5.40)  m/uL


 


Neutrophils #  7.8 H   (1.3-7.7)  k/uL


 


Carbon Dioxide   32 H  (22-30)  mmol/L


 


BUN   25 H  (7-17)  mg/dL


 


Glucose   133 H  (74-99)  mg/dL














Assessment and Plan


Plan: 





Impression:





#1 Chronic interstitial lung disease status post VATS procedure, biopsy results 

are pending.





#2 Chronic obstructive pulmonary disease, currently inactive and stable.





#3 Chronic and ongoing tobacco dependence.





Plan:





The patient was seen and evaluated by Dr. Chino.  Her chest x-ray was reviewed.

  No significant changes. She is doing well from the pulmonary standpoint.  She 

is again encouraged regarding the increased use of the incentive spirometer and 

cough and deep breathing exercises.  We'll continue with her current 

medications.  We'll increase her activity as tolerated.  We'll continue to 

follow.

## 2017-05-13 NOTE — XR
EXAMINATION TYPE: XR chest 1V portable

 

DATE OF EXAM: 5/13/2017 7:42 AM

 

HISTORY: Shortness of breath.

 

COMPARISON: 5/12/2017

 

TECHNIQUE: Single view of the chest is submitted.

 

FINDINGS:

 

Right-sided chest tube is unchanged in position. No sizable pneumothorax at this time. Subcutaneous a
ir continues along the right chest wall.

 

 

Demonstrated are scattered senescent parenchymal change.  

 

Scattered patchy airspace infiltrates persist throughout the right lung. Suspect small left-sided eff
usion.

 

The heart is stable.

 

Hilar and mediastinal structures are within normal limits.  

 

Degenerative changes are seen of the dorsal spine. 

 

 IMPRESSION: 

 

1.  Stable chest.

## 2017-05-13 NOTE — P.PN
Subjective


Principal diagnosis: 





Bilateral pulmonary infiltrates, COPD





POD #5 thorascopic right lung biopsy





Currently sitting up in chair in no apparent distress.  Denies pain, shortness 

of breath.  All questions answered.  Minimal air leak present this morning.





Objective





- Vital Signs


Vital signs: 


 Vital Signs











Temp  97.8 F   05/13/17 04:00


 


Pulse  96   05/13/17 08:28


 


Resp  18   05/13/17 04:00


 


BP  177/74   05/13/17 04:00


 


Pulse Ox  93 L  05/13/17 08:12








 Intake & Output











 05/12/17 05/13/17 05/13/17





 18:59 06:59 18:59


 


Intake Total 720  0


 


Output Total 30 640 


 


Balance 690 -640 0


 


Weight  130.6 kg 


 


Intake:   


 


  Oral 720  0


 


Output:   


 


  Chest Tube Drainage 30 40 


 


    Right Lateral Chest 30 40 


 


  Urine  600 


 


Other:   


 


  Voiding Method Bedside Commode Bedside Commode 





 Incontinent  


 


  # Bowel Movements  1 














- Constitutional


General appearance: Present: cooperative, no acute distress, obese





- Respiratory


Details: 





Lungs sounds diminished bilaterally.  Respirations even, nonlabored.  Currently 

on 3 L nasal cannula with oxygen saturation 94%.  Right pleural chest tube to 

waterseal for more than 24 hours.  Drained 10 mL serous fluid overnight, 80 mL 

in the last 24 hours.  Minimal air leak present.  Able to achieve 1250 mL on 

incentive spirometry.





- Cardiovascular


Details: 





S1, S2 present.  Regular rate and rhythm, normal sinus rhythm on telemetry.  

Bilateral lower extremity lymphedema present.  SCDs present.





- Gastrointestinal


Gastrointestinal Comment(s): 





Abdomen soft, nontender, nondistended.  Active bowel sounds 4 quadrants.  

Positive bowel movement this morning.  Tolerating diet.





- Genitourinary


Genitourinary Comment(s): 





Continues to void clear, yellow urine.





- Musculoskeletal


Musculoskeletal: Present: strength equal bilaterally





- Psychiatric


Psychiatric: Present: A&O x's 3, appropriate affect, intact judgment & insight





- Allied health notes


Allied health notes reviewed: nursing





- Labs


CBC & Chem 7: 


 05/13/17 06:02





 05/13/17 06:02


Labs: 


 Abnormal Lab Results - Last 24 Hours (Table)











  05/13/17 05/13/17 Range/Units





  06:02 06:02 


 


WBC  10.8 H   (3.8-10.6)  k/uL


 


RBC  3.73 L   (3.80-5.40)  m/uL


 


Neutrophils #  7.8 H   (1.3-7.7)  k/uL


 


Carbon Dioxide   32 H  (22-30)  mmol/L


 


BUN   25 H  (7-17)  mg/dL


 


Glucose   133 H  (74-99)  mg/dL














- Imaging and Cardiology


Chest x-ray: report reviewed, image reviewed





Assessment and Plan


(1) Bilateral pulmonary infiltrates on CXR


Status: Acute   





(2) Acute exacerbation of chronic obstructive airways disease


Status: Acute   


Plan: 


Patient was seen and examined.  Right pleural chest tube with minimal air leak 

on water seal for greater than 24 hours.  Chest x-ray reviewed.   Repeat chest x

-ray in the morning.  Continue current medication regimen.  Encourage incentive 

spirometry use. Encourage ambulation in the hallway.  Wean O2 as tolerated.  

Patient needs aggressive reinforcement of incentive spirometry use and 

ambulation. 


Time with Patient: Greater than 30

## 2017-05-14 LAB
ANION GAP SERPL CALC-SCNC: 5 MMOL/L
BASOPHILS # BLD AUTO: 0.1 K/UL (ref 0–0.2)
BASOPHILS NFR BLD AUTO: 1 %
BUN SERPL-SCNC: 25 MG/DL (ref 7–17)
CALCIUM SPEC-MCNC: 8.9 MG/DL (ref 8.4–10.2)
CH: 31.2
CHCM: 31.1
CHLORIDE SERPL-SCNC: 100 MMOL/L (ref 98–107)
CO2 SERPL-SCNC: 34 MMOL/L (ref 22–30)
EOSINOPHIL # BLD AUTO: 0.4 K/UL (ref 0–0.7)
EOSINOPHIL NFR BLD AUTO: 4 %
ERYTHROCYTE [DISTWIDTH] IN BLOOD BY AUTOMATED COUNT: 3.76 M/UL (ref 3.8–5.4)
ERYTHROCYTE [DISTWIDTH] IN BLOOD: 14.7 % (ref 11.5–15.5)
GLUCOSE SERPL-MCNC: 115 MG/DL (ref 74–99)
HCT VFR BLD AUTO: 37.9 % (ref 34–46)
HDW: 2.54
HGB BLD-MCNC: 12 GM/DL (ref 11.4–16)
LUC NFR BLD AUTO: 3 %
LYMPHOCYTES # SPEC AUTO: 2.2 K/UL (ref 1–4.8)
LYMPHOCYTES NFR SPEC AUTO: 19 %
MCH RBC QN AUTO: 31.9 PG (ref 25–35)
MCHC RBC AUTO-ENTMCNC: 31.6 G/DL (ref 31–37)
MCV RBC AUTO: 100.9 FL (ref 80–100)
MONOCYTES # BLD AUTO: 1 K/UL (ref 0–1)
MONOCYTES NFR BLD AUTO: 8 %
NEUTROPHILS # BLD AUTO: 7.5 K/UL (ref 1.3–7.7)
NEUTROPHILS NFR BLD AUTO: 66 %
NON-AFRICAN AMERICAN GFR(MDRD): >60
POTASSIUM SERPL-SCNC: 4.7 MMOL/L (ref 3.5–5.1)
SODIUM SERPL-SCNC: 139 MMOL/L (ref 137–145)
WBC # BLD AUTO: 0.33 10*3/UL
WBC # BLD AUTO: 11.4 K/UL (ref 3.8–10.6)
WBC (PEROX): 11.38

## 2017-05-14 RX ADMIN — SODIUM CHLORIDE, PRESERVATIVE FREE SCH ML: 5 INJECTION INTRAVENOUS at 21:00

## 2017-05-14 RX ADMIN — BUDESONIDE AND FORMOTEROL FUMARATE DIHYDRATE SCH PUFF: 160; 4.5 AEROSOL RESPIRATORY (INHALATION) at 08:30

## 2017-05-14 RX ADMIN — SODIUM CHLORIDE, PRESERVATIVE FREE SCH ML: 5 INJECTION INTRAVENOUS at 08:16

## 2017-05-14 RX ADMIN — HYDROCODONE BITARTRATE AND ACETAMINOPHEN PRN EACH: 5; 325 TABLET ORAL at 08:14

## 2017-05-14 RX ADMIN — SODIUM CHLORIDE, PRESERVATIVE FREE SCH: 5 INJECTION INTRAVENOUS at 03:01

## 2017-05-14 RX ADMIN — BUDESONIDE AND FORMOTEROL FUMARATE DIHYDRATE SCH PUFF: 160; 4.5 AEROSOL RESPIRATORY (INHALATION) at 20:07

## 2017-05-14 RX ADMIN — DOCUSATE SODIUM AND SENNOSIDES SCH: 50; 8.6 TABLET ORAL at 03:01

## 2017-05-14 RX ADMIN — IPRATROPIUM BROMIDE AND ALBUTEROL SULFATE SCH ML: .5; 3 SOLUTION RESPIRATORY (INHALATION) at 16:58

## 2017-05-14 RX ADMIN — HEPARIN SODIUM SCH UNIT: 5000 INJECTION, SOLUTION INTRAVENOUS; SUBCUTANEOUS at 21:00

## 2017-05-14 RX ADMIN — IPRATROPIUM BROMIDE AND ALBUTEROL SULFATE SCH ML: .5; 3 SOLUTION RESPIRATORY (INHALATION) at 20:07

## 2017-05-14 RX ADMIN — Medication SCH UNIT: at 12:09

## 2017-05-14 RX ADMIN — HYDROCODONE BITARTRATE AND ACETAMINOPHEN PRN EACH: 5; 325 TABLET ORAL at 18:11

## 2017-05-14 RX ADMIN — HEPARIN SODIUM SCH UNIT: 5000 INJECTION, SOLUTION INTRAVENOUS; SUBCUTANEOUS at 08:13

## 2017-05-14 RX ADMIN — PANTOPRAZOLE SODIUM SCH MG: 40 TABLET, DELAYED RELEASE ORAL at 06:48

## 2017-05-14 RX ADMIN — DOCUSATE SODIUM AND SENNOSIDES SCH EACH: 50; 8.6 TABLET ORAL at 08:13

## 2017-05-14 RX ADMIN — IPRATROPIUM BROMIDE AND ALBUTEROL SULFATE SCH ML: .5; 3 SOLUTION RESPIRATORY (INHALATION) at 11:57

## 2017-05-14 RX ADMIN — IPRATROPIUM BROMIDE AND ALBUTEROL SULFATE SCH ML: .5; 3 SOLUTION RESPIRATORY (INHALATION) at 08:30

## 2017-05-14 RX ADMIN — DOCUSATE SODIUM AND SENNOSIDES SCH EACH: 50; 8.6 TABLET ORAL at 21:00

## 2017-05-14 NOTE — XR
EXAMINATION TYPE: XR chest 1V portable

 

DATE OF EXAM: 5/14/2017 7:06 AM

 

COMPARISON: 5/13/2017

 

HISTORY: Pneumothorax

 

TECHNIQUE: Single frontal view of the chest is obtained.

 

FINDINGS:  Right-sided chest tube is unchanged in position. 5% right apical pneumothorax noted. Subcu
taneous air continues along the right chest wall. Arthropathy of the shoulders.

 

Demonstrated are scattered senescent parenchymal change. Scattered patchy airspace infiltrates persis
t throughout the right lung. Suspect small left-sided effusion. The heart is stable. Hilar and medias
tinal structures are within normal limits. Degenerative changes are seen of the dorsal spine. 

 

 

IMPRESSION:  

1. Stable right-sided pneumothorax and subcutaneous emphysema. 2 bilateral infiltrate or atelectasis.

## 2017-05-14 NOTE — P.PN
<Cher Egan - Last Filed: 05/14/17 08:12>





Subjective


Principal diagnosis: 





Bilateral pulmonary infiltrates, COPD





POD #6 thorascopic right lung biopsy





Currently sitting up in chair in no apparent distress.  No complaints 

overnight.  Denies pain, shortness of breath.  Minimal air leak present this 

morning.





Objective





- Vital Signs


Vital signs: 


 Vital Signs











Temp  98.2 F   05/14/17 04:00


 


Pulse  80   05/14/17 04:00


 


Resp  18   05/14/17 04:00


 


BP  155/75   05/14/17 04:00


 


Pulse Ox  94 L  05/14/17 04:00








 Intake & Output











 05/13/17 05/14/17 05/14/17





 18:59 06:59 18:59


 


Intake Total 458  


 


Output Total 150 550 


 


Balance 308 -550 


 


Weight  129.8 kg 


 


Intake:   


 


  Oral 458  


 


Output:   


 


  Chest Tube Drainage 50 50 


 


    Right Lateral Chest 50 50 


 


  Urine 100 500 


 


Other:   


 


  Voiding Method Bedside Commode Bedside Commode 


 


  # Bowel Movements 1  














- Constitutional


General appearance: Present: cooperative, no acute distress, obese





- Respiratory


Details: 





Lungs sounds was bilaterally.  Respirations even, nonlabored.  Currently on the 

3 L nasal cannula with oxygen saturation 94%.  Able to achieve 1500 mL on her 

incentive spirometry.  Right pleural chest tube to waterseal for greater than 

48 hours, drained 20 mL serous fluid overnight, 120 mL in the last 24 hours.  

There is minimal air leak present with coughing only.





- Cardiovascular


Details: 





S1, S2 present.  Regular rate and rhythm, normal sinus rhythm on telemetry.





- Gastrointestinal


Gastrointestinal Comment(s): 





Abdomen soft, nontender, nondistended.  Active bowel sounds 4 quadrants.  

Tolerating diet.





- Genitourinary


Genitourinary Comment(s): 





Continues to void clear, yellow urine.





- Musculoskeletal


Musculoskeletal: Present: strength equal bilaterally





- Psychiatric


Psychiatric: Present: A&O x's 3, appropriate affect, intact judgment & insight





- Allied health notes


Allied health notes reviewed: nursing





- Labs


CBC & Chem 7: 


 05/14/17 05:42





 05/14/17 05:42


Labs: 


 Abnormal Lab Results - Last 24 Hours (Table)











  05/14/17 05/14/17 Range/Units





  05:42 05:42 


 


WBC  11.4 H   (3.8-10.6)  k/uL


 


RBC  3.76 L   (3.80-5.40)  m/uL


 


MCV  100.9 H   (80.0-100.0)  fL


 


Carbon Dioxide   34 H  (22-30)  mmol/L


 


BUN   25 H  (7-17)  mg/dL


 


Glucose   115 H  (74-99)  mg/dL














- Imaging and Cardiology


Chest x-ray: report reviewed, image reviewed





Assessment and Plan


(1) Bilateral pulmonary infiltrates on CXR


Status: Acute   





(2) Acute exacerbation of chronic obstructive airways disease


Status: Acute   


Plan: 


Patient was seen and examined.  Right pleural chest tube with minimal air leak 

on water seal for greater than 48 hours.  Chest x-ray reviewed.   Will 

discontinue chest tube today.  Repeat chest x-ray in the morning.  Continue 

current medication regimen.  Encourage incentive spirometry use. Encourage 

ambulation in the hallway.  Wean O2 as tolerated.  Patient needs aggressive 

reinforcement of incentive spirometry use and ambulation. 


Time with Patient: Greater than 30





<Grabiel Banerjee - Last Filed: 05/14/17 11:03>





Objective





- Vital Signs


Vital signs: 


 Vital Signs











Temp  98 F   05/14/17 08:00


 


Pulse  88   05/14/17 08:52


 


Resp  20   05/14/17 08:00


 


BP  144/66   05/14/17 08:00


 


Pulse Ox  93 L  05/14/17 08:33








 Intake & Output











 05/13/17 05/14/17 05/14/17





 18:59 06:59 18:59


 


Intake Total 458  190


 


Output Total 150 550 


 


Balance 308 -550 190


 


Weight  129.8 kg 


 


Intake:   


 


  Oral 458  190


 


Output:   


 


  Chest Tube Drainage 50 50 


 


    Right Lateral Chest 50 50 


 


  Urine 100 500 


 


Other:   


 


  Voiding Method Bedside Commode Bedside Commode 


 


  # Bowel Movements 1  














- Labs


CBC & Chem 7: 


 05/14/17 05:42





 05/14/17 05:42


Labs: 


 Abnormal Lab Results - Last 24 Hours (Table)











  05/14/17 05/14/17 Range/Units





  05:42 05:42 


 


WBC  11.4 H   (3.8-10.6)  k/uL


 


RBC  3.76 L   (3.80-5.40)  m/uL


 


MCV  100.9 H   (80.0-100.0)  fL


 


Carbon Dioxide   34 H  (22-30)  mmol/L


 


BUN   25 H  (7-17)  mg/dL


 


Glucose   115 H  (74-99)  mg/dL














Assessment and Plan


Plan: 


D/C CT today


Discharge planning, hopefully home tomorrow

## 2017-05-14 NOTE — PN
DATE OF SERVICE:  05/13/2017



This 71-year-old woman was admitted to the bilateral interstitial lung 

disease and VATS.  The patient had surgery still there is some air 

leak.  



No chest pain, no palpitation, no fever. 



On exam, alert and oriented x3. Pulse is 92, blood pressure 157/(    

), respiration 20, temperature 97.4, pulse ox 90% on 3 liters. 

HEENT:  Conjunctivae normal.

NECK:  No jugular venous distention.

CARDIOVASCULAR:  S1, S2 muffled.

RESPIRATORY:  Breath sounds diminished at the bases.  A few scattered 

rhonchi and crackles.  Expiratory wheezing. 

ABDOMEN: Soft, nontender.

Nervous system: No focal deficits. 



LABS: WBC 10.8. Other labs are noted. 



ASSESSMENT:

1. Bilateral interstitial lung disease possibly status post VATS lung 

biopsy.  

2. Status post chest tube drainage on the right with continued air 

leak, improving.  

3. Chronic obstructive pulmonary disease history. 

4. History of nicotine dependence.

5. Increased WBC. 

6. Increased MCV. 

7. Hyperkalemia, mild secondary to acute renal failure, present on 

admission, improved.  

8. Acute renal failure, possibly prerenal factors with a creatinine of 

1.25.  

9. Increased random blood sugar. 

10. History of asthma, chronic intermittent. 

11. Gastroesophageal reflux disease. 

12. Hypertension. 

13. Hyperlipidemia. 

14. History of degenerative joint disease. 

15. History of pneumonia. 

16. History of chronic hypoxic respiratory failure on home O2 by nasal 

cannula.  

17. History of migraines. 

18. History of lymphedema. 

19. History of spinal stenosis and degenerative joint disease.

20. History of perforated ulcer.

21. Urinary incontinence.

22. History of cholecystectomy. 

23. History of depression. 

24. History of nicotine dependence.

25. Obesity, body mass index of 47.7. 

26. Full code. 



Recommendations and discussion: Recommend to continue current 

medications, continue symptomatic treatment.  Otherwise, creatinine is 

improved significantly. Wait for atelectasis to subside and continue 

to monitor closely.  Follow with cardiothoracic surgery.  Further 

recommendations to follow.

## 2017-05-14 NOTE — PN
Jenifer Elizalde is a 71-year-old female that is status post back 

lung biopsy for interstitial lung disease. The patient is doing well. 

Chest tube will come out later today. She may be discharged home 

tomorrow. She has a history of COPD, ongoing tobacco use. I was 

concerned about either respiratory bronchiolitis, interstitial lung 

disease or possible (    ) of interstitial pneumonia.  The biopsy 

report apparently has come back from [facility].   It was read by Dr. Thu Chandler.   The report shows patchy air space filling process centered 

on the terminal airway comprising plugs and whirls of organizing 

fibrin and myofibroblastic indicative of organizing pneumonia. There 

are scattered multinucleated giant cells affiliated with the 

organizing pneumonia along with a nonspecific chronic inflammatory (   

 ) lymphocytes and plasma cells. There is no evidence of exogenous 

aspirate substances. A second notable finding is pulmonary 

arteriopathy showing both acute fibrin thrombi, thromboemboli and 

recannulized  vessels with multiple lumens indicative of chronic 

thromboembolic disease. There is some evidence of at least mild 

hypertensive changes as well. The ultimate etiology is organizing 

pneumonia is not entirely clear, it was  pathology who read this at [facility] considered this as aspiration, but the absence of exogenous 

material militates against that. Granulomas infection is also 

important consideration but the granulomas are actually not so very 

prominent nor particularly well formed to strongly point to infection. 

 As you know, organizing pneumonia also term, bronchiolitis obliterans 

and organizing pneumonia is a relatively nonspecific manifestation of 

acute or subacute lung injury. The specimen could be that or if 

idiopathic, could be cryptogenic organizing pneumonia. Anyway, it does 

give me something to treat in this case.  



Clinically she is doing well. She is feeling well. 



CURRENT VITAL SIGNS: Temperature 98.7, heart rate 96, respiratory rate 

20, blood pressure 140/71, mean 94, 3 liters saturation 93%. Appears 

in no acute distress examination is grossly unremarkable. Mucous 

membranes are moist. No oral lesions. Neck is supple. Full range of 

motion. No adenopathy or thyromegaly. Cardiovascular examination 

reveals regular rhythm and rate. Lungs reveal diminished breath 

sounds. A few scattered rhonchi and wheezes. Breath sounds are 

diminished.  

ABDOMEN: Soft. Bowel sounds are heard. 

EXTREMITIES: Intact. Slight edema. 



Chest x-ray shows some subcutaneous emphysema. Chest tube will be 

removed later today. A small right-sided pneumothorax is seen.  



Labs are reviewed. White count 11.4, hemoglobin 12, hematocrit 37.9, 

platelet count 334,000. Sodium, potassium and chloride normal. CO2 of 

34, BUN and creatinine 25 and 0.81.  



Medications are reviewed. 



ASSESSMENT:

1. Status post VATS lung biopsy on the right side for interstitial 

lung disease, with pathology consistent with either an organizing 

pneumonia such as cryptogenic organizing pneumonia or bronchiolitis 

obliterans organizing pneumonia depending on whether it is idiopathic 

or secondary to some other process. In addition, she had evidence of 

micropulmonary emboli obstructing the pulmonary capillaries which 

should also likely be treated.  

2. History of chronic obstructive pulmonary disease. 

3. Interstitial lung disease. 

4. Chronic and ongoing tobacco dependence. 

5. Right small right-sided pneumothorax. 

6. Mild subcutaneous emphysema. 



PLAN: The patient may have the chest tube removed today. Likely be 

discharged tomorrow. She may go home or to a nursing home.  I will see 

her in the office. I will allow her to heal up and then I will start 

her on steroids for what appeared to be cryptogenic organizing 

pneumonia/bronchiolitis obliterans organizing pneumonia and probably 

start her on one of the new factor (   )  inhibitor such as Xarelto or 

Eliquis or him micropulmonary thromboembolic disease.

## 2017-05-15 VITALS — RESPIRATION RATE: 18 BRPM | HEART RATE: 86 BPM | DIASTOLIC BLOOD PRESSURE: 61 MMHG | SYSTOLIC BLOOD PRESSURE: 144 MMHG

## 2017-05-15 VITALS — TEMPERATURE: 97 F

## 2017-05-15 RX ADMIN — HYDROCODONE BITARTRATE AND ACETAMINOPHEN PRN EACH: 5; 325 TABLET ORAL at 08:37

## 2017-05-15 RX ADMIN — IPRATROPIUM BROMIDE AND ALBUTEROL SULFATE SCH: .5; 3 SOLUTION RESPIRATORY (INHALATION) at 13:19

## 2017-05-15 RX ADMIN — PANTOPRAZOLE SODIUM SCH MG: 40 TABLET, DELAYED RELEASE ORAL at 06:32

## 2017-05-15 RX ADMIN — SODIUM CHLORIDE, PRESERVATIVE FREE SCH ML: 5 INJECTION INTRAVENOUS at 08:38

## 2017-05-15 RX ADMIN — BUDESONIDE AND FORMOTEROL FUMARATE DIHYDRATE SCH PUFF: 160; 4.5 AEROSOL RESPIRATORY (INHALATION) at 08:05

## 2017-05-15 RX ADMIN — IPRATROPIUM BROMIDE AND ALBUTEROL SULFATE SCH ML: .5; 3 SOLUTION RESPIRATORY (INHALATION) at 08:06

## 2017-05-15 RX ADMIN — DOCUSATE SODIUM AND SENNOSIDES SCH EACH: 50; 8.6 TABLET ORAL at 08:37

## 2017-05-15 RX ADMIN — HEPARIN SODIUM SCH UNIT: 5000 INJECTION, SOLUTION INTRAVENOUS; SUBCUTANEOUS at 08:37

## 2017-05-15 RX ADMIN — Medication SCH UNIT: at 08:38

## 2017-05-15 NOTE — PN
DATE OF SERVICE: 05/14/2017



This 71 -year-old woman was admitted after and air leak is 

schedule to have the thoracostomy tube removed today. No chest pain or 

palpitation. No fever.  



On exam, alert and oriented times three.  Pulse is 92, blood pressure 

is 140/67, respirations 20, temperature 98.1, pulse ox 93% on 3 

liters.  

HEENT: Conjunctivae normal. 

NECK: No jugular venous distention.

CARDIOVASCULAR: S1, S2 muffled. 

RESPIRATORY: Breath sounds diminished at the bases. A few scattered 

rhonchi and crackles. Expiratory wheezing also present.  

ABDOMEN: Soft, nontender.  

LEGS: No edema. No swelling. 

CENTRAL NERVOUS SYSTEM: No focal deficits. 



LABS: WBC 11.9, Hemoglobin 12, glucose 115. 



ASSESSMENT:

1. Bilateral interstitial lung disease possibly, status post VATs lung 

biopsy.  

2. Status post chest tube drainage on the right with continued air 

leak improving.  

3. Chronic obstructive pulmonary disease history. 

4. History of nicotine dependence. 

5. Increased WBC, increased MCV. 

6. Hyperkalemia, mild secondary to acute renal failure, present on 

admission improved. 

7. Acute renal failure, possible prerenal factors with a creatinine 

1.25.  

8. Increased random blood sugar. 

9. History of asthma, chronic intermittent. 

10. History of gastroesophageal reflux disease. 

11. Hypertension, essential. 

12. Hyperlipidemia. 

13. History of degenerative joint disease. 

14. History of pneumonia.

15. History of chronic hypoxic respiratory failure, on home O2 by 

nasal cannula.  

16. History of migraines.

17. History of lymphedema. 

18. History of spinal stenosis and degenerative joint disease.

19. History of perforated ulcer.,

20. Urinary incontinence.

21. History of cholecystectomy. 

22. History of depression. 

23. History of nicotine dependence. 

24. Obesity, body mass index of 47.7. 

25. FULL CODE. 



RECOMMENDATIONS AND DISCUSSION: Recommended to continue with current 

medications, continue with monitoring, symptomatic treatment.  

Otherwise at this time, I recommend to continue with  current 

medications, continue bronchodilators, incentive spirometry. Closely 

follow with cardiothoracic surgery.   Chest x-ray was reviewed. 

Significant improvement.  Further recommendations to follow.    



MTDD

## 2017-05-15 NOTE — XR
EXAMINATION TYPE: XR chest 2V

 

DATE OF EXAM: 5/15/2017 6:25 AM

 

COMPARISON: 5/14/2017

 

TECHNIQUE: PA and lateral views submitted.

 

HISTORY: Post chest tube removal

 

FINDINGS:

The lungs are clear and  there is no pneumothorax, pleural effusion, or focal pneumonia.  Extensive s
ubcutaneous emphysema noted. Bilateral areas of subsegmental consolidation. No sizable pneumothorax.

 

IMPRESSION: 

1. No sizable pneumothorax post chest tube removal. Subcutaneous emphysema in bilateral areas of atel
ectasis or infiltrate persist.

## 2017-05-15 NOTE — P.DS
Providers


Date of admission: 


05/08/17 07:45





Attending physician: 


Grabiel Banerjee





Consults: 





 





05/08/17 10:42


Consult Physician Routine 


   Consulting Provider: Jose Eduardo Aragon


   Consult Reason/Comments: pulmonary manangment/known to you


   Do you want consulting provider notified?: Yes





05/09/17 07:47


Consult Physician Routine 


   Consulting Provider: Annie Sánchez


   Consult Reason/Comments: medical management


   Do you want consulting provider notified?: Yes











Primary care physician: 


SARIKA Escobar Lillie








- Discharge Diagnosis(es)


(1) Bilateral pulmonary infiltrates on CXR


Current Visit: Yes   Status: Acute   





(2) Acute exacerbation of chronic obstructive airways disease


Current Visit: No   Status: Acute   


Hospital Course: 





FINAL DIAGNOSIS: 


1.[Bilateral pulmonary infiltrates]


2.[COPD with home oxygen dependence]


3.[Obesity, decreased mobility, wheelchair-bound]


4.[History of uterine cancer with hysterectomy]





PRINCIPAL PROCEDURE: []


1.[Thoracoscopic right lung biopsy]








HISTORY OF PRESENT ILLNESS: [This 71-year-old female was being followed by Dr. Chino for her underlying COPD and interstitial lung disease.  She was having 

worsening respiratory symptoms without the ability to gain control over the 

symptoms.  She had a CAT scan demonstrating changes consistent with COPD, but 

also diffuse groundglass changes more pronounced in the right lung than the 

left.  She was referred to Dr. Banerjee for evaluation and requested lung biopsy. 

An extensive discussion was had with the patient, all risks and benefits were 

explained, and the patient agreed to proceed with surgery.]





HOSPITAL COURSE:[The patient was brought to the hospital on 05/08/2017, taken 

to the preoperative area, prepared in usual fashion, and subsequently taken to 

the operating room where Dr. Banerjee performed an elective thoracoscopic right 

lung biopsy.  Upon completion of surgery she was extubated, transferred to the 

postanesthesia care unit where she was recovered, and subsequently admitted to 

[address] [place] [address] for further monitoring and rehabilitation.  She did have a 

persistent air leak in her right pleural chest tube after surgery which 

resolved and her chest tube was able to be discontinued on postoperative day #

6.  She did require physical therapy but continued to be mostly chair bound and 

was discharged to rehab for strength and mobility training.  She received 

written and verbal instruction regarding medications, activity restriction, 

signs and symptoms requiring physician notification, and follow-up appointments.

]





COMPLICATIONS: [The patient experienced a persistent air leak post surgery 

which resolved.]





CONSULTATIONS: 


1.[Dr. Aragon for pulmonology]


2.[Dr. Sánchez for medical management]





DISCHARGE INSTRUCTIONS: 


1.  No driving for 2 weeks, or until physician gives their ok.


2.  The patient should sleep in her own bed, no medical bed needed.


3.  Stairs are not an issue.  If the bedroom is upstairs, it is advised that 

the patient go up at night and down in the morning for the first week.  Go 

slowly, using handrail and take 1 step at a time.


4.  Please notify surgeon of redness, swelling, purulent drainage from incision 

site or fever greater than 101F


5.  Continue pain control per as needed orders.


6.  Continue with incentive spirometry 10 times every hour until otherwise 

directed by the physician.


7. Must shower daily using liquid antibacterial soap.


8. Please remove chest dressing on [Tuesday May 16, 2017] with routine incision 

care thereafter.  No lotions, powders, ointments on incisions.

















Plan - Discharge Summary


New Discharge Prescriptions: 


HYDROcodone/APAP 5-325MG [Norco 5-325] 1 each PO Q4HR PRN #60 tab


 PRN Reason: Pain 1-5


Discharge Medication List





Albuterol Sulfate [Ventolin HFA] 1 - 2 puff INHALATION RT-Q6H PRN 09/16/14 [

History]


Cholecalciferol [Vitamin D3] 1,000 unit PO DAILY 09/18/14 [History]


rOPINIRole HCL [Requip] 0.5 mg PO HS 06/10/15 [History]


Budesonide/Formoterol Fumarate [Symbicort 160-4.5 Mcg Inhaler] 2 puff 

INHALATION RT-BID 10/01/15 [History]


Ipratropium-Albuterol Nebulize [Duoneb 0.5 mg-3 mg/3 ml Soln] 1 applicate IH RT-

QID PRN 10/01/15 [History]


Aclidinium Bromide [Tudorza Pressair] 1 puff INHALATION RT-DAILY 01/06/17 [

History]


Furosemide [Lasix] 20 mg PO DAILY 01/06/17 [History]


Potassium Chloride [Klor-Con 10] 10 meq PO QAM 05/03/17 [History]


predniSONE 5 mg PO DAILY 05/03/17 [History]


HYDROcodone/APAP 5-325MG [[place] 5-325] 1 each PO Q4HR PRN #60 tab 05/15/17 [Rx]


Sennosides-Docusate Sodium [Senokot-S] 1 each PO BID  tab 05/15/17 [Rx]








Follow up Appointment(s)/Referral(s): 


SARIKA Moreira III, MD [Primary Care Provider] - 06/15/17 2:00 pm (Appointment 

with VANNESSA Siddiqui)


Grabiel Banerjee MD [STAFF PHYSICIAN] - 06/05/17 2:15 pm


Tanmay Chino DO [Doctor of Osteopathic Medicine] - 06/12/17 10:00 am


Activity/Diet/Wound Care/Special Instructions: 


DISCHARGE INSTRUCTIONS: 


1.  No driving for 2 weeks, or until physician gives their ok.


2.  The patient should sleep in their own bed, no medical bed needed.


3.  Stairs are not an issue.  If the bedroom is upstairs, it is advised that 

the patient go up at night and down in the morning for the first week.  Go 

slowly, using handrail and take 1 step at a time.


4.  Please notify surgeon of redness, swelling, purulent drainage from incision 

site or fever greater than 101F


5.  Continue pain control per as needed orders.


6.  Continue with incentive spirometry 10 times every hour until otherwise 

directed by the physician.


7. Must shower daily using liquid antibacterial soap.


8. Please remove chest dressing on [Tuesday May 16, 2017] with routine incision 

care thereafter.  No lotions, powders, ointments on incisions.

## 2017-05-15 NOTE — P.PN
<Cher Egan - Last Filed: 05/15/17 10:37>





Subjective


Principal diagnosis: 





Bilateral pulmonary infiltrates, COPD





POD #7 thorascopic right lung biopsy





Currently sitting up in chair in no apparent distress.  Right pleural chest 

tube removed yesterday.  Patient had episode of confusion overnight when her 

oxygen was off, resolved with replacement of oxygen.





Objective





- Vital Signs


Vital signs: 


 Vital Signs











Temp  98.5 F   05/15/17 04:00


 


Pulse  112 H  05/15/17 04:00


 


Resp  20   05/15/17 04:00


 


BP  155/85   05/15/17 04:00


 


Pulse Ox  92 L  05/15/17 04:00








 Intake & Output











 05/14/17 05/15/17 05/15/17





 18:59 06:59 18:59


 


Intake Total 607 200 


 


Output Total  550 


 


Balance 607 -350 


 


Weight  129.7 kg 


 


Intake:   


 


  Oral 607 200 


 


Output:   


 


  Urine  550 


 


Other:   


 


  Voiding Method  Bedside Commode 


 


  # Voids 1 2 


 


  # Bowel Movements  1 














- Constitutional


General appearance: Present: cooperative, no acute distress, obese





- Respiratory


Details: 





Lungs sounds diminished bilaterally.  Respirations even, nonlabored.  Currently 

on 3 L nasal cannula with oxygen saturation 92%.  Able to achieve 1250 mL on 

incentive spirometry.  Effective cough.





- Cardiovascular


Details: 





S1, S2 present.  Regular rate and rhythm, normal sinus rhythm on telemetry.





- Gastrointestinal


Gastrointestinal Comment(s): 





Abdomen soft, nontender, nondistended.  Active bowel sounds 4 quadrants.  

Tolerating diet.





- Genitourinary


Genitourinary Comment(s): 





Continues to void clear, yellow urine.





- Integumentary


Integumentary Comment(s): 





Right lateral chest incision sites well approximated with Dermabond, chest tube 

site covered with dry intact dressing.





- Musculoskeletal


Musculoskeletal: Present: strength equal bilaterally





- Psychiatric


Psychiatric: Present: A&O x's 3, appropriate affect, intact judgment & insight





- Allied health notes


Allied health notes reviewed: nursing





- Labs


CBC & Chem 7: 


 05/14/17 05:42





 05/14/17 05:42





- Imaging and Cardiology


Chest x-ray: image reviewed





Assessment and Plan


(1) Bilateral pulmonary infiltrates on CXR


Status: Acute   





(2) Acute exacerbation of chronic obstructive airways disease


Status: Acute   


Plan: 


Patient was seen and examined.   Chest x-ray reviewed.   Continue current 

medication regimen.  Encourage incentive spirometry use. Encourage ambulation 

in the hallway.  Patient needs aggressive reinforcement of incentive spirometry 

use and ambulation.  Plan is to discharge patient today home with home care 

versus rehab.  Will discuss with social work.


Time with Patient: Greater than 30





<Cuong Rios - Last Filed: 05/15/17 10:39>





Objective





- Vital Signs


Vital signs: 


 Vital Signs











Temp  97.1 F L  05/15/17 08:00


 


Pulse  94   05/15/17 08:15


 


Resp  19   05/15/17 08:00


 


BP  116/49   05/15/17 08:00


 


Pulse Ox  94 L  05/15/17 08:06








 Intake & Output











 05/14/17 05/15/17 05/15/17





 18:59 06:59 18:59


 


Intake Total 607 200 360


 


Output Total  550 


 


Balance 607 -350 360


 


Weight  129.7 kg 


 


Intake:   


 


  Oral 607 200 360


 


Output:   


 


  Urine  550 


 


Other:   


 


  Voiding Method  Bedside Commode 


 


  # Voids 1 2 


 


  # Bowel Movements  1 














- Labs


CBC & Chem 7: 


 05/14/17 05:42





 05/14/17 05:42





Assessment and Plan


Plan: 


The patient was seen and examined.  I agree with the above assessment and plan.

  Her chest tube was removed yesterday.  Two-view chest x-ray performed this 

morning does not reveal any significant pneumothorax.  She'll be discharged to 

subacute rehab today.

## 2017-05-15 NOTE — PN
DATE OF SERVICE: 05/15/2017





This 71 -year-old woman was admitted with bilateral interstitial lung 

disease, had VATs procedure and lung biopsy.   No chest pain. No 

palpitations. No fever.  



On exam, alert and oriented times three.  Pulse 94. Blood pressure 

140/60. respiratory  rate 19,  Temperature is normal.  Pulse ox 94% on 

3 L.   

HEENT: Conjunctivae normal. 

NECK: No jugular venous distention. 

CARDIOVASCULAR: S1, S2 muffled.

RESPIRATORY: Breath sounds diminished at the bases.    A few scattered 

rhonchi and crackles.  

ABDOMEN: Soft, nontender.  

LEGS: No edema. No swelling. 

CENTRAL NERVOUS SYSTEM: No focal deficits. 



LABS: WBC 11.5, hemoglobin is 12, glucose 115. 



ASSESSMENT:

1. Bilateral interstitial lung disease possibly status post VATS and 

lung biopsy.  

2. Status post chest tube drainage on the right with continued air 

leak improving.  

3. Chronic obstructive pulmonary disease history. 

4. History of nicotine dependence. 

5. Increased WBC.

6. Increased MCV. 

7. Hypokalemia, mild secondary to acute renal failure, present on 

admission improved.  

8. Acute renal failure, possibly prerenal factors with a creatinine 

1.25.  

9. Increased random blood sugar. 

10. History of asthma, chronic independence. 

11. History of gastroesophageal reflux disease. 

12. Hypertension, essential. 

13. Hyperlipidemia. 

14. History of degenerative joint disease. 

15. History of pneumonia. 

16. History of chronic hypoxic respiratory failure on home O2 nasal 

cannula.  

17. History of migraines. 

18. History of lymphedema. 

19. History of spinal stenosis and degenerative joint disease .

20. History of perforated ulcer.

21. History of urinary incontinence.

22. History of cholecystectomy. 

23. History of depression. 

24. History of nicotine dependence. 

25. Obesity body mass index 47.7.

26. FULL CODE.



RECOMMENDATIONS AND DISCUSSION: In this 71-year-old woman who 

presented with multiple complex medical issues, we will monitor the 

patient closely. Continue the current medications, continue 

symptomatic treatment.  Otherwise, at this time, I would recommend 

continue with bronchodilators. Continue with. The patient is 

also being evaluated for ECF rehab.  Chest tube recommendations 

per cardiothoracic surgery.  Further recommendations to follow.    



MTDD

## 2017-05-15 NOTE — P.PN
Subjective





71-year-old female patient status post video-assisted thoracoscopic lung 

biopsy.  The postoperative findings are consistent with organizing pneumonia, 

bronchiolitis obliterans with organizing pneumonia, exact etiology is not 

clear.  The patient's chest tube has been removed.  The patient is doing well.  

The patient will be going to [facility] for further recuperation.  No chest pain.  

Surgical wound site is dry clean and intact.  No fever chills or night sweats.  

No other complaints otherwise for now.





Objective





- Vital Signs


Vital signs: 


 Vital Signs











Temp  97.1 F L  05/15/17 08:00


 


Pulse  94   05/15/17 12:00


 


Resp  19   05/15/17 12:00


 


BP  147/62   05/15/17 12:00


 


Pulse Ox  94 L  05/15/17 12:00








 Intake & Output











 05/14/17 05/15/17 05/15/17





 18:59 06:59 18:59


 


Intake Total 607 200 600


 


Output Total  550 200


 


Balance 607 -350 400


 


Weight  129.7 kg 129.7 kg


 


Intake:   


 


  Oral 607 200 600


 


Output:   


 


  Urine  550 200


 


Other:   


 


  Voiding Method  Bedside Commode 


 


  # Voids 1 2 1


 


  # Bowel Movements  1 1














- Exam





Head exam was generally normal. There was no scleral icterus or corneal arcus. 

Mucous membranes were moist.Neck was supple and without jugular venous 

distension, thyromegaly, or carotid bruits. Carotids were easily palpable 

bilaterally. There was no adenopathy.  There is crowding of the posterior 

oropharynx.  There is no goiter or neck masses.  Lungs sounds are diminished in 

lung bases bilaterally along with some bibasilar crackles.  Cardiac exam 

revealed the PMI to be normally situated and sized. The rhythm was regular and 

no extrasystoles were noted during several minutes of auscultation. The first 

and second heart sounds were normal and physiologic splitting of the second 

heart sound was noted. There were no murmurs, rubs, clicks, or 

gallops.Abdominal exam revealed normal bowel sounds. The abdomen was soft, non-

tender, and without masses, organomegaly, or appreciable enlargement of the 

abdominal aorta.Examination of the extremities revealed easily palpable radial, 

femoral and pedal pulses. There was no cyanosis, clubbing or edema.





- Labs


CBC & Chem 7: 


 05/14/17 05:42





 05/14/17 05:42





Assessment and Plan


Plan: 





Assessment





1 interstitial lung disease status post video-assisted thoracoscopic lung biopsy





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 

findings





3 COPD





4 hypoxic respiratory failure





5 tiny right apical pneumothorax





6 obesity





Plan





Patient is okay for ECF transfer.  She'll be following with Dr. Chino regarding 

the prolapse of uterus with organizing pneumonia for further treatment.  She'll 

likely require a combination of steroids plus or minus anticoagulation knowing 

that there was micro-pulmonary emboli identified to suggest chronic 

thromboembolism.  Continue using incentive spirometer.  Follow-up with the 

pulmonary clinic.

## 2017-08-24 ENCOUNTER — HOSPITAL ENCOUNTER (INPATIENT)
Dept: HOSPITAL 47 - EC | Age: 72
LOS: 2 days | Discharge: HOME HEALTH | DRG: 190 | End: 2017-08-26
Attending: HOSPITALIST | Admitting: HOSPITALIST
Payer: MEDICARE

## 2017-08-24 VITALS — BODY MASS INDEX: 50.7 KG/M2

## 2017-08-24 DIAGNOSIS — I13.0: ICD-10-CM

## 2017-08-24 DIAGNOSIS — Z79.84: ICD-10-CM

## 2017-08-24 DIAGNOSIS — I50.9: ICD-10-CM

## 2017-08-24 DIAGNOSIS — J96.22: ICD-10-CM

## 2017-08-24 DIAGNOSIS — J96.21: ICD-10-CM

## 2017-08-24 DIAGNOSIS — Z90.710: ICD-10-CM

## 2017-08-24 DIAGNOSIS — Z79.4: ICD-10-CM

## 2017-08-24 DIAGNOSIS — Z82.5: ICD-10-CM

## 2017-08-24 DIAGNOSIS — Z79.52: ICD-10-CM

## 2017-08-24 DIAGNOSIS — Z86.14: ICD-10-CM

## 2017-08-24 DIAGNOSIS — M19.91: ICD-10-CM

## 2017-08-24 DIAGNOSIS — H26.9: ICD-10-CM

## 2017-08-24 DIAGNOSIS — Z71.3: ICD-10-CM

## 2017-08-24 DIAGNOSIS — F32.9: ICD-10-CM

## 2017-08-24 DIAGNOSIS — Z79.01: ICD-10-CM

## 2017-08-24 DIAGNOSIS — J44.1: Primary | ICD-10-CM

## 2017-08-24 DIAGNOSIS — E78.5: ICD-10-CM

## 2017-08-24 DIAGNOSIS — E11.22: ICD-10-CM

## 2017-08-24 DIAGNOSIS — K21.9: ICD-10-CM

## 2017-08-24 DIAGNOSIS — T38.0X5A: ICD-10-CM

## 2017-08-24 DIAGNOSIS — Z79.899: ICD-10-CM

## 2017-08-24 DIAGNOSIS — E11.65: ICD-10-CM

## 2017-08-24 DIAGNOSIS — N18.2: ICD-10-CM

## 2017-08-24 DIAGNOSIS — J84.89: ICD-10-CM

## 2017-08-24 DIAGNOSIS — Z87.891: ICD-10-CM

## 2017-08-24 DIAGNOSIS — Z79.51: ICD-10-CM

## 2017-08-24 DIAGNOSIS — Z90.49: ICD-10-CM

## 2017-08-24 DIAGNOSIS — E66.01: ICD-10-CM

## 2017-08-24 DIAGNOSIS — Z86.711: ICD-10-CM

## 2017-08-24 DIAGNOSIS — I89.0: ICD-10-CM

## 2017-08-24 LAB
ALP SERPL-CCNC: 186 U/L (ref 38–126)
ALT SERPL-CCNC: 50 U/L (ref 9–52)
ANION GAP SERPL CALC-SCNC: 7 MMOL/L
APTT BLD: 27.5 SEC (ref 22–30)
AST SERPL-CCNC: 46 U/L (ref 14–36)
BASOPHILS # BLD AUTO: 0 K/UL (ref 0–0.2)
BASOPHILS NFR BLD AUTO: 0 %
BUN SERPL-SCNC: 30 MG/DL (ref 7–17)
CALCIUM SPEC-MCNC: 9.3 MG/DL (ref 8.4–10.2)
CH: 30.6
CHCM: 32.1
CHLORIDE SERPL-SCNC: 101 MMOL/L (ref 98–107)
CK SERPL-CCNC: 49 U/L (ref 30–135)
CO2 SERPL-SCNC: 33 MMOL/L (ref 22–30)
EOSINOPHIL # BLD AUTO: 0 K/UL (ref 0–0.7)
EOSINOPHIL NFR BLD AUTO: 0 %
ERYTHROCYTE [DISTWIDTH] IN BLOOD BY AUTOMATED COUNT: 3.24 M/UL (ref 3.8–5.4)
ERYTHROCYTE [DISTWIDTH] IN BLOOD: 14.5 % (ref 11.5–15.5)
GLUCOSE BLD-MCNC: 187 MG/DL (ref 75–99)
GLUCOSE BLD-MCNC: 330 MG/DL (ref 75–99)
GLUCOSE SERPL-MCNC: 182 MG/DL (ref 74–99)
HCT VFR BLD AUTO: 31.1 % (ref 34–46)
HDW: 3.03
HEMOGLOBIN A1C: 10.1 % (ref 4.2–6.1)
HGB BLD-MCNC: 10.2 GM/DL (ref 11.4–16)
INR PPP: 1.2 (ref ?–1.2)
LUC NFR BLD AUTO: 2 %
LYMPHOCYTES # SPEC AUTO: 1.5 K/UL (ref 1–4.8)
LYMPHOCYTES NFR SPEC AUTO: 10 %
MAGNESIUM SPEC-SCNC: 2 MG/DL (ref 1.6–2.3)
MCH RBC QN AUTO: 31.3 PG (ref 25–35)
MCHC RBC AUTO-ENTMCNC: 32.7 G/DL (ref 31–37)
MCV RBC AUTO: 95.9 FL (ref 80–100)
MONOCYTES # BLD AUTO: 0.5 K/UL (ref 0–1)
MONOCYTES NFR BLD AUTO: 4 %
NEUTROPHILS # BLD AUTO: 12.7 K/UL (ref 1.3–7.7)
NEUTROPHILS NFR BLD AUTO: 84 %
NON-AFRICAN AMERICAN GFR(MDRD): 49
PARTICLE COUNT: 1376
PH UR: 5 [PH] (ref 5–8)
POTASSIUM SERPL-SCNC: 4.1 MMOL/L (ref 3.5–5.1)
PROT SERPL-MCNC: 6.5 G/DL (ref 6.3–8.2)
PT BLD: 12.3 SEC (ref 9–12)
RBC UR QL: 2 /HPF (ref 0–5)
SODIUM SERPL-SCNC: 141 MMOL/L (ref 137–145)
SP GR UR: 1.01 (ref 1–1.03)
SQUAMOUS UR QL AUTO: 1 /HPF (ref 0–4)
TROPONIN I SERPL-MCNC: <0.012 NG/ML (ref 0–0.03)
UA BILLING (MACRO VS. MICRO): (no result)
UROBILINOGEN UR QL STRIP: <2 MG/DL (ref ?–2)
WBC # BLD AUTO: 0.27 10*3/UL
WBC # BLD AUTO: 15 K/UL (ref 3.8–10.6)
WBC #/AREA URNS HPF: 2 /HPF (ref 0–5)
WBC (PEROX): 15.16

## 2017-08-24 PROCEDURE — 85730 THROMBOPLASTIN TIME PARTIAL: CPT

## 2017-08-24 PROCEDURE — 82550 ASSAY OF CK (CPK): CPT

## 2017-08-24 PROCEDURE — 85379 FIBRIN DEGRADATION QUANT: CPT

## 2017-08-24 PROCEDURE — 36415 COLL VENOUS BLD VENIPUNCTURE: CPT

## 2017-08-24 PROCEDURE — 99285 EMERGENCY DEPT VISIT HI MDM: CPT

## 2017-08-24 PROCEDURE — 87075 CULTR BACTERIA EXCEPT BLOOD: CPT

## 2017-08-24 PROCEDURE — 93005 ELECTROCARDIOGRAM TRACING: CPT

## 2017-08-24 PROCEDURE — 85025 COMPLETE CBC W/AUTO DIFF WBC: CPT

## 2017-08-24 PROCEDURE — 82553 CREATINE MB FRACTION: CPT

## 2017-08-24 PROCEDURE — 71020: CPT

## 2017-08-24 PROCEDURE — 87070 CULTURE OTHR SPECIMN AEROBIC: CPT

## 2017-08-24 PROCEDURE — 85027 COMPLETE CBC AUTOMATED: CPT

## 2017-08-24 PROCEDURE — 84484 ASSAY OF TROPONIN QUANT: CPT

## 2017-08-24 PROCEDURE — 80053 COMPREHEN METABOLIC PANEL: CPT

## 2017-08-24 PROCEDURE — 81001 URINALYSIS AUTO W/SCOPE: CPT

## 2017-08-24 PROCEDURE — 80048 BASIC METABOLIC PNL TOTAL CA: CPT

## 2017-08-24 PROCEDURE — 83880 ASSAY OF NATRIURETIC PEPTIDE: CPT

## 2017-08-24 PROCEDURE — 85610 PROTHROMBIN TIME: CPT

## 2017-08-24 PROCEDURE — 87205 SMEAR GRAM STAIN: CPT

## 2017-08-24 PROCEDURE — 83605 ASSAY OF LACTIC ACID: CPT

## 2017-08-24 PROCEDURE — 96374 THER/PROPH/DIAG INJ IV PUSH: CPT

## 2017-08-24 PROCEDURE — 96375 TX/PRO/DX INJ NEW DRUG ADDON: CPT

## 2017-08-24 PROCEDURE — 83036 HEMOGLOBIN GLYCOSYLATED A1C: CPT

## 2017-08-24 PROCEDURE — 83735 ASSAY OF MAGNESIUM: CPT

## 2017-08-24 PROCEDURE — 94640 AIRWAY INHALATION TREATMENT: CPT

## 2017-08-24 RX ADMIN — INSULIN LISPRO SCH UNIT: 100 INJECTION, SOLUTION INTRAVENOUS; SUBCUTANEOUS at 21:09

## 2017-08-24 RX ADMIN — LEVOFLOXACIN SCH MG: 500 TABLET, FILM COATED ORAL at 20:06

## 2017-08-24 RX ADMIN — INSULIN LISPRO SCH UNIT: 100 INJECTION, SOLUTION INTRAVENOUS; SUBCUTANEOUS at 21:08
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
## 2017-08-24 NOTE — XR
EXAMINATION TYPE: XR chest 2V

 

DATE OF EXAM: 8/24/2017

 

COMPARISON: 5/15/2017

 

TECHNIQUE: PA and lateral views submitted.

 

HISTORY: Shortness of breath

 

FINDINGS:

Degenerative change spine. Interstitium somewhat coarsened. Arthropathy shoulders seen. Heart is stab
le. No sizable pleural effusion or pneumothorax. Elevated left hemidiaphragm seen. Right perihilar an
d left lower lobe atelectasis or scar noted. Early infiltrate not excluded. Arthropathy of the should
ers. Surgical clips

 

IMPRESSION:

1. Right perihilar and upper lobe subsegmental atelectasis or early infiltrate. Density within the ri
ght upper lobe slightly irregular follow-up to resolution recommended or short-term follow-up CT scan
. Left lower lobe suspected atelectasis.

## 2017-08-25 LAB
GLUCOSE BLD-MCNC: 131 MG/DL (ref 75–99)
GLUCOSE BLD-MCNC: 134 MG/DL (ref 75–99)
GLUCOSE BLD-MCNC: 177 MG/DL (ref 75–99)
GLUCOSE BLD-MCNC: 180 MG/DL (ref 75–99)
GLUCOSE BLD-MCNC: 221 MG/DL (ref 75–99)

## 2017-08-25 RX ADMIN — INSULIN LISPRO SCH UNIT: 100 INJECTION, SOLUTION INTRAVENOUS; SUBCUTANEOUS at 21:47

## 2017-08-25 RX ADMIN — BUDESONIDE AND FORMOTEROL FUMARATE DIHYDRATE SCH PUFF: 160; 4.5 AEROSOL RESPIRATORY (INHALATION) at 20:10

## 2017-08-25 RX ADMIN — LEVOFLOXACIN SCH MG: 500 TABLET, FILM COATED ORAL at 09:05

## 2017-08-25 RX ADMIN — INSULIN HUMAN SCH UNIT: 100 INJECTION, SUSPENSION SUBCUTANEOUS at 09:15

## 2017-08-25 RX ADMIN — POTASSIUM CITRATE SCH MEQ: 10 TABLET ORAL at 11:25

## 2017-08-25 RX ADMIN — INSULIN LISPRO SCH UNIT: 100 INJECTION, SOLUTION INTRAVENOUS; SUBCUTANEOUS at 09:09

## 2017-08-25 RX ADMIN — FUROSEMIDE SCH MG: 10 INJECTION, SOLUTION INTRAMUSCULAR; INTRAVENOUS at 09:06

## 2017-08-25 RX ADMIN — INSULIN LISPRO SCH: 100 INJECTION, SOLUTION INTRAVENOUS; SUBCUTANEOUS at 13:23

## 2017-08-25 RX ADMIN — HYDROCODONE BITARTRATE AND ACETAMINOPHEN PRN EACH: 5; 325 TABLET ORAL at 18:01

## 2017-08-25 RX ADMIN — IPRATROPIUM BROMIDE AND ALBUTEROL SULFATE SCH ML: .5; 3 SOLUTION RESPIRATORY (INHALATION) at 20:10

## 2017-08-25 RX ADMIN — GLIMEPIRIDE SCH MG: 1 TABLET ORAL at 09:07

## 2017-08-25 RX ADMIN — INSULIN LISPRO SCH UNIT: 100 INJECTION, SOLUTION INTRAVENOUS; SUBCUTANEOUS at 17:45

## 2017-08-25 RX ADMIN — RIVAROXABAN SCH MG: 10 TABLET, FILM COATED ORAL at 09:06

## 2017-08-25 RX ADMIN — POTASSIUM CITRATE SCH MEQ: 10 TABLET ORAL at 21:48

## 2017-08-25 RX ADMIN — IPRATROPIUM BROMIDE AND ALBUTEROL SULFATE SCH ML: .5; 3 SOLUTION RESPIRATORY (INHALATION) at 15:38
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
## 2017-08-26 VITALS — RESPIRATION RATE: 20 BRPM | SYSTOLIC BLOOD PRESSURE: 117 MMHG | DIASTOLIC BLOOD PRESSURE: 52 MMHG | TEMPERATURE: 96.8 F

## 2017-08-26 VITALS — HEART RATE: 84 BPM

## 2017-08-26 LAB
ANION GAP SERPL CALC-SCNC: 7 MMOL/L
BUN SERPL-SCNC: 53 MG/DL (ref 7–17)
CALCIUM SPEC-MCNC: 8.7 MG/DL (ref 8.4–10.2)
CH: 31.1
CHCM: 32.3
CHLORIDE SERPL-SCNC: 97 MMOL/L (ref 98–107)
CO2 SERPL-SCNC: 34 MMOL/L (ref 22–30)
ERYTHROCYTE [DISTWIDTH] IN BLOOD BY AUTOMATED COUNT: 2.95 M/UL (ref 3.8–5.4)
ERYTHROCYTE [DISTWIDTH] IN BLOOD: 15 % (ref 11.5–15.5)
GLUCOSE BLD-MCNC: 144 MG/DL (ref 75–99)
GLUCOSE BLD-MCNC: 175 MG/DL (ref 75–99)
GLUCOSE SERPL-MCNC: 135 MG/DL (ref 74–99)
HCT VFR BLD AUTO: 28.6 % (ref 34–46)
HDW: 2.92
HGB BLD-MCNC: 9.1 GM/DL (ref 11.4–16)
MCH RBC QN AUTO: 30.9 PG (ref 25–35)
MCHC RBC AUTO-ENTMCNC: 31.9 G/DL (ref 31–37)
MCV RBC AUTO: 96.9 FL (ref 80–100)
NON-AFRICAN AMERICAN GFR(MDRD): 44
POTASSIUM SERPL-SCNC: 3.2 MMOL/L (ref 3.5–5.1)
SODIUM SERPL-SCNC: 138 MMOL/L (ref 137–145)
WBC # BLD AUTO: 16.4 K/UL (ref 3.8–10.6)

## 2017-08-26 RX ADMIN — IPRATROPIUM BROMIDE AND ALBUTEROL SULFATE SCH ML: .5; 3 SOLUTION RESPIRATORY (INHALATION) at 09:19

## 2017-08-26 RX ADMIN — FUROSEMIDE SCH MG: 10 INJECTION, SOLUTION INTRAMUSCULAR; INTRAVENOUS at 09:10

## 2017-08-26 RX ADMIN — HYDROCODONE BITARTRATE AND ACETAMINOPHEN PRN EACH: 5; 325 TABLET ORAL at 14:33

## 2017-08-26 RX ADMIN — INSULIN LISPRO SCH UNIT: 100 INJECTION, SOLUTION INTRAVENOUS; SUBCUTANEOUS at 12:39

## 2017-08-26 RX ADMIN — RIVAROXABAN SCH MG: 10 TABLET, FILM COATED ORAL at 09:10

## 2017-08-26 RX ADMIN — GLIMEPIRIDE SCH MG: 1 TABLET ORAL at 09:09

## 2017-08-26 RX ADMIN — POTASSIUM CITRATE SCH MEQ: 10 TABLET ORAL at 09:11

## 2017-08-26 RX ADMIN — POTASSIUM CHLORIDE SCH MEQ: 20 TABLET, EXTENDED RELEASE ORAL at 12:39

## 2017-08-26 RX ADMIN — IPRATROPIUM BROMIDE AND ALBUTEROL SULFATE SCH ML: .5; 3 SOLUTION RESPIRATORY (INHALATION) at 13:39

## 2017-08-26 RX ADMIN — INSULIN LISPRO SCH UNIT: 100 INJECTION, SOLUTION INTRAVENOUS; SUBCUTANEOUS at 09:08

## 2017-08-26 RX ADMIN — BUDESONIDE AND FORMOTEROL FUMARATE DIHYDRATE SCH PUFF: 160; 4.5 AEROSOL RESPIRATORY (INHALATION) at 09:19

## 2017-08-26 RX ADMIN — POTASSIUM CHLORIDE SCH MEQ: 20 TABLET, EXTENDED RELEASE ORAL at 14:10

## 2017-08-26 RX ADMIN — INSULIN HUMAN SCH UNIT: 100 INJECTION, SUSPENSION SUBCUTANEOUS at 09:26

## 2017-08-26 NOTE — P.PN
Subjective





Progress note dated 08/26/2017





71-year-old female well-known to me.  She has a history of underlying COPD.  

She also suffers from the rather medical problems including obesity 

hypertension and diabetes.  More recently, based on a now abnormal computed 

tomography scan, I sent her to Dr. Grabiel Banerjee for a VATS lung biopsy.  She 

had 2 pulmonary diagnoses including bronchiolitis obliterans with organizing 

pneumonia as well as pulmonary thrombotic disease mostly in the 

microcirculation.  The patient was started on a factor X a inhibitor as well as 

prednisone.  As been doing relatively well.  Came into the emergency room for 

complaints of increasing shortness of breath although I think her more major 

issue was a lower extremity edema and weight gain.  Anyway as I mention 

yesterday my consultation, I thought she could probably be discharged.





Objective





- Vital Signs


Vital signs: 


 Vital Signs











Temp  96.8 F L  08/26/17 07:00


 


Pulse  84   08/26/17 09:47


 


Resp  20   08/26/17 07:00


 


BP  117/52   08/26/17 07:00


 


Pulse Ox  93 L  08/26/17 07:00








 Intake & Output











 08/25/17 08/26/17 08/26/17





 18:59 06:59 18:59


 


Intake Total 480 240 


 


Balance 480 240 


 


Weight 133.991 kg 113.8 kg 


 


Intake:   


 


  Oral 480 240 


 


Other:   


 


  # Voids 3 1 


 


  # Bowel Movements 0  














- Exam





No acute distress, oriented 3.





HEENT examination is grossly unremarkable.  She has a cushingoid facies.  

Mucous membranes are moist.  No oral lesion.





Neck supple.  Full range of motion.  No adenopathy or thyromegaly.  Neck veins 

are flat.





Cardiovascular examination reveals regular rhythm rate.  S1-S2 normal.  No S3-

S4 or murmur.





Lungs reveal relatively clear breath sounds.  A few scattered mild rhonchi.  No 

crackles or wheezes.





Abdomen obese.  Bowel sounds are heard.





Extremities reveal significant edema.  The some cellulitis and chronic venous 

stasis changes.  





Skin without rash.  





Neurologic examination is nonfocal.





- Labs


CBC & Chem 7: 


 08/26/17 07:35





 08/26/17 07:35


Labs: 


 Abnormal Lab Results - Last 24 Hours (Table)











  08/25/17 08/25/17 08/25/17 Range/Units





  12:29 17:18 20:49 


 


WBC     (3.8-10.6)  k/uL


 


RBC     (3.80-5.40)  m/uL


 


Hgb     (11.4-16.0)  gm/dL


 


Hct     (34.0-46.0)  %


 


Potassium     (3.5-5.1)  mmol/L


 


Chloride     ()  mmol/L


 


Carbon Dioxide     (22-30)  mmol/L


 


BUN     (7-17)  mg/dL


 


Creatinine     (0.52-1.04)  mg/dL


 


Glucose     (74-99)  mg/dL


 


POC Glucose (mg/dL)  134 H  131 H  177 H  (75-99)  mg/dL














  08/26/17 08/26/17 08/26/17 Range/Units





  07:35 07:35 07:38 


 


WBC  16.4 H    (3.8-10.6)  k/uL


 


RBC  2.95 L    (3.80-5.40)  m/uL


 


Hgb  9.1 L    (11.4-16.0)  gm/dL


 


Hct  28.6 L    (34.0-46.0)  %


 


Potassium   3.2 L   (3.5-5.1)  mmol/L


 


Chloride   97 L   ()  mmol/L


 


Carbon Dioxide   34 H   (22-30)  mmol/L


 


BUN   53 H   (7-17)  mg/dL


 


Creatinine   1.20 H   (0.52-1.04)  mg/dL


 


Glucose   135 H   (74-99)  mg/dL


 


POC Glucose (mg/dL)    144 H  (75-99)  mg/dL








 Microbiology - Last 24 Hours (Table)











 08/25/17 12:00 Gram Stain - Preliminary





 Ankle - Left Wound Culture - Preliminary


 


 08/25/17 12:00 Anaerobic Culture - Preliminary





 Ankle - Left 














Assessment and Plan


(1) BOOP (bronchiolitis obliterans with organizing pneumonia)


Status: Acute   





(2) Pulmonary artery thrombosis


Status: Acute   





(3) COPD exacerbation


Status: Acute   


Plan: 





Plan dated 08/25/2017





We'll make sure that check E is on her prednisone dose which is 30 mg a day.  

She does not need IV Solu-Medrol.  This would actually be detrimental as it 

relates to her weight gain and fluid retention and her diabetes.  In addition, 

we'll make sure she is on a factor X A inhibitor at 20 mg daily.  In addition, 

we'll place her on DuoNeb's 4 times a day and when necessary and Symbicort 160/

4.52 puffs twice a day.  She really is pretty much at baseline.  We'll allow 

the primary service to decide on whether or not they want to do anything 

differently with her lower extremity edema.  We'll continue to follow.  

Prognosis is guarded.  She had a lot of weight gain recently.  Probably benefit 

from additional diuretics.





Plan dated 08/26/2017





From my perspective the patient could be discharged on the hospital.  The 

patient should resume her factor X a inhibitor and also prednisone at a dose of 

30 mg a day.  I told her she could come see me in the office this week.  She 

can come either Monday Wednesday and Thursday or Friday morning.  Additional 

recommendations and suggestions are forthcoming.  Prognosis is guarded.


Time with Patient: Less than 30

## 2017-08-29 NOTE — DS
FINAL DIAGNOSES:

1.   Chronic obstructive pulmonary disease acute exacerbation with acute on 
chronic hypoxic respiratory failure. 

2.   Stage II Kidney disease. 

3.   Congestive heart failure. 

4.   History of pulmonary embolism. 

5.   Diabetes mellitus type 2. 



DISCHARGE DISPOSITION: The patient is being discharged in stable condition with 
guarded prognosis. Discharge cleared by Dr. Chino. 



HISTORY OF PRESENT ILLNESS: This 71-year-old woman with past medical history 
including chronic obstructive pulmonary disease acute exacerbation. The patient 
was treated symptomatically.  The patient improved significantly. The patient 
was given bronchodilators. Dr. Chino saw the patient. 



On exam, vitals are stable. 

CARDIOVASCULAR: S1, S2. 

ABDOMEN: Soft, nontender. 

RESPIRATORY:  A few rhonchi. 



DISCHARGE ADVICE:

1.   Diet is cardiac. 

2.   Activity limited until followup.

3.   Follow up with Dr. Moreira in 2 to 3 days. 

4.   Follow with Dr. Chino as recommended. 

5.   Wound care as recommended. 



Medications will be as follows: 

1.    Tudorza 1 puff b.i.d. 

2.   Ventolin HFA p.r.n. 

3.   Symbicort 2 puffs b.i.d. 

4.   Vitamin D3, 3000 daily. 

5.   Lasix 40 mg.

6.   Remeron 1 mg p.o. daily. 

7.   Norco 5 mg q.8 p.r.n. 

8.    lispro 26 a.c. supper and NPH 54 a.c. breakfast. 

9.   Albuterol and Atrovent updrafts q.i.d. and p.r.n. 

10.   Xalatan eyedrops 1 drop both eyes. 

11.   Levaquin 250 mg daily for 5 days. 

12.   Klor-Con 20 mEq p.o. b.i.d. 

13.   Prednisone 30 mg p.o. daily.

14.   Continue tapering  

15.   Xarelto 20 mg 

16.   Requip 0.5 mg q.h.s. 



Once again, the patient is discharged in stable condition with guarded 
prognosis. 
MTDD

## 2017-11-27 ENCOUNTER — HOSPITAL ENCOUNTER (OUTPATIENT)
Dept: HOSPITAL 47 - LABWHC1 | Age: 72
Discharge: HOME | End: 2017-11-27
Payer: MEDICARE

## 2017-11-27 DIAGNOSIS — I50.9: Primary | ICD-10-CM

## 2017-11-27 LAB
ANION GAP SERPL CALC-SCNC: 11 MMOL/L
BUN SERPL-SCNC: 44 MG/DL (ref 7–17)
CALCIUM SPEC-MCNC: 8.4 MG/DL (ref 8.4–10.2)
CHLORIDE SERPL-SCNC: 92 MMOL/L (ref 98–107)
CO2 SERPL-SCNC: 36 MMOL/L (ref 22–30)
GLUCOSE SERPL-MCNC: 296 MG/DL (ref 74–99)
MAGNESIUM SPEC-SCNC: 1.5 MG/DL (ref 1.6–2.3)
NON-AFRICAN AMERICAN GFR(MDRD): 39
POTASSIUM SERPL-SCNC: 3.7 MMOL/L (ref 3.5–5.1)
SODIUM SERPL-SCNC: 139 MMOL/L (ref 137–145)

## 2017-11-27 PROCEDURE — 36415 COLL VENOUS BLD VENIPUNCTURE: CPT

## 2017-11-27 PROCEDURE — 83735 ASSAY OF MAGNESIUM: CPT

## 2017-11-27 PROCEDURE — 84100 ASSAY OF PHOSPHORUS: CPT

## 2017-11-27 PROCEDURE — 80048 BASIC METABOLIC PNL TOTAL CA: CPT

## 2018-01-05 ENCOUNTER — HOSPITAL ENCOUNTER (INPATIENT)
Dept: HOSPITAL 47 - EC | Age: 73
LOS: 7 days | Discharge: NURSING, REHAB OR LONG-TERM CARE | DRG: 871 | End: 2018-01-12
Payer: MEDICARE

## 2018-01-05 VITALS — BODY MASS INDEX: 47.3 KG/M2

## 2018-01-05 DIAGNOSIS — E66.2: ICD-10-CM

## 2018-01-05 DIAGNOSIS — L03.115: ICD-10-CM

## 2018-01-05 DIAGNOSIS — G89.29: ICD-10-CM

## 2018-01-05 DIAGNOSIS — Z90.710: ICD-10-CM

## 2018-01-05 DIAGNOSIS — R26.9: ICD-10-CM

## 2018-01-05 DIAGNOSIS — N39.3: ICD-10-CM

## 2018-01-05 DIAGNOSIS — L03.116: ICD-10-CM

## 2018-01-05 DIAGNOSIS — E87.2: ICD-10-CM

## 2018-01-05 DIAGNOSIS — H26.9: ICD-10-CM

## 2018-01-05 DIAGNOSIS — Z79.52: ICD-10-CM

## 2018-01-05 DIAGNOSIS — G25.81: ICD-10-CM

## 2018-01-05 DIAGNOSIS — G43.909: ICD-10-CM

## 2018-01-05 DIAGNOSIS — K21.9: ICD-10-CM

## 2018-01-05 DIAGNOSIS — Z79.01: ICD-10-CM

## 2018-01-05 DIAGNOSIS — J44.1: ICD-10-CM

## 2018-01-05 DIAGNOSIS — R29.6: ICD-10-CM

## 2018-01-05 DIAGNOSIS — M48.00: ICD-10-CM

## 2018-01-05 DIAGNOSIS — Z86.59: ICD-10-CM

## 2018-01-05 DIAGNOSIS — E11.9: ICD-10-CM

## 2018-01-05 DIAGNOSIS — I27.82: ICD-10-CM

## 2018-01-05 DIAGNOSIS — I10: ICD-10-CM

## 2018-01-05 DIAGNOSIS — Z90.81: ICD-10-CM

## 2018-01-05 DIAGNOSIS — I27.81: ICD-10-CM

## 2018-01-05 DIAGNOSIS — N17.0: ICD-10-CM

## 2018-01-05 DIAGNOSIS — Z79.899: ICD-10-CM

## 2018-01-05 DIAGNOSIS — J84.89: ICD-10-CM

## 2018-01-05 DIAGNOSIS — Z79.51: ICD-10-CM

## 2018-01-05 DIAGNOSIS — G93.41: ICD-10-CM

## 2018-01-05 DIAGNOSIS — Z79.1: ICD-10-CM

## 2018-01-05 DIAGNOSIS — A41.59: ICD-10-CM

## 2018-01-05 DIAGNOSIS — Z85.42: ICD-10-CM

## 2018-01-05 DIAGNOSIS — J44.0: ICD-10-CM

## 2018-01-05 DIAGNOSIS — K57.30: ICD-10-CM

## 2018-01-05 DIAGNOSIS — Z99.81: ICD-10-CM

## 2018-01-05 DIAGNOSIS — J15.212: ICD-10-CM

## 2018-01-05 DIAGNOSIS — Z90.49: ICD-10-CM

## 2018-01-05 DIAGNOSIS — J96.11: ICD-10-CM

## 2018-01-05 DIAGNOSIS — E78.5: ICD-10-CM

## 2018-01-05 DIAGNOSIS — Z79.4: ICD-10-CM

## 2018-01-05 DIAGNOSIS — A41.02: Primary | ICD-10-CM

## 2018-01-05 DIAGNOSIS — J15.6: ICD-10-CM

## 2018-01-05 DIAGNOSIS — Z87.891: ICD-10-CM

## 2018-01-05 DIAGNOSIS — I89.0: ICD-10-CM

## 2018-01-05 LAB
ALBUMIN SERPL-MCNC: 3.5 G/DL (ref 3.5–5)
ALP SERPL-CCNC: 123 U/L (ref 38–126)
ALT SERPL-CCNC: 36 U/L (ref 9–52)
AMMONIA PLAS-SCNC: 22 UMOL/L (ref ?–30)
AMYLASE SERPL-CCNC: 54 U/L (ref 30–110)
ANION GAP SERPL CALC-SCNC: 10 MMOL/L
AST SERPL-CCNC: 51 U/L (ref 14–36)
BUN SERPL-SCNC: 61 MG/DL (ref 7–17)
CALCIUM SPEC-MCNC: 9.6 MG/DL (ref 8.4–10.2)
CELLS COUNTED: 200
CHLORIDE SERPL-SCNC: 85 MMOL/L (ref 98–107)
CK SERPL-CCNC: 189 U/L (ref 30–135)
CO2 SERPL-SCNC: 40 MMOL/L (ref 22–30)
ERYTHROCYTE [DISTWIDTH] IN BLOOD BY AUTOMATED COUNT: 3.88 M/UL (ref 3.8–5.4)
ERYTHROCYTE [DISTWIDTH] IN BLOOD: 24 % (ref 11.5–15.5)
GLUCOSE BLD-MCNC: 290 MG/DL (ref 75–99)
GLUCOSE SERPL-MCNC: 341 MG/DL (ref 74–99)
GLUCOSE UR QL: (no result)
HCT VFR BLD AUTO: 34.2 % (ref 34–46)
HGB BLD-MCNC: 10.5 GM/DL (ref 11.4–16)
HYALINE CASTS UR QL AUTO: 4 /LPF (ref 0–2)
LACTATE BLDV-SCNC: 2.7 MMOL/L (ref 0.7–2)
LIPASE SERPL-CCNC: 142 U/L (ref 23–300)
LYMPHOCYTES # BLD MANUAL: 2.53 K/UL (ref 1–4.8)
MAGNESIUM SPEC-SCNC: 1.4 MG/DL (ref 1.6–2.3)
MCH RBC QN AUTO: 27.2 PG (ref 25–35)
MCHC RBC AUTO-ENTMCNC: 30.8 G/DL (ref 31–37)
MCV RBC AUTO: 88.1 FL (ref 80–100)
MONOCYTES # BLD MANUAL: 0.63 K/UL (ref 0–1)
NEUTROPHILS NFR BLD MANUAL: 69 %
NEUTS SEG # BLD MANUAL: 28.7 K/UL (ref 1.3–7.7)
PH UR: 6 [PH] (ref 5–8)
PLATELET # BLD AUTO: 263 K/UL (ref 150–450)
POTASSIUM SERPL-SCNC: 4.2 MMOL/L (ref 3.5–5.1)
PROT SERPL-MCNC: 6.4 G/DL (ref 6.3–8.2)
RBC UR QL: 2 /HPF (ref 0–5)
SODIUM SERPL-SCNC: 135 MMOL/L (ref 137–145)
SP GR UR: 1.01 (ref 1–1.03)
UROBILINOGEN UR QL STRIP: <2 MG/DL (ref ?–2)
WBC # BLD AUTO: 31.6 K/UL (ref 3.8–10.6)
WBC #/AREA URNS HPF: 6 /HPF (ref 0–5)

## 2018-01-05 PROCEDURE — 94640 AIRWAY INHALATION TREATMENT: CPT

## 2018-01-05 PROCEDURE — 82150 ASSAY OF AMYLASE: CPT

## 2018-01-05 PROCEDURE — 83735 ASSAY OF MAGNESIUM: CPT

## 2018-01-05 PROCEDURE — 82553 CREATINE MB FRACTION: CPT

## 2018-01-05 PROCEDURE — 80202 ASSAY OF VANCOMYCIN: CPT

## 2018-01-05 PROCEDURE — 87324 CLOSTRIDIUM AG IA: CPT

## 2018-01-05 PROCEDURE — 87502 INFLUENZA DNA AMP PROBE: CPT

## 2018-01-05 PROCEDURE — 84132 ASSAY OF SERUM POTASSIUM: CPT

## 2018-01-05 PROCEDURE — 87077 CULTURE AEROBIC IDENTIFY: CPT

## 2018-01-05 PROCEDURE — 83605 ASSAY OF LACTIC ACID: CPT

## 2018-01-05 PROCEDURE — 87186 SC STD MICRODIL/AGAR DIL: CPT

## 2018-01-05 PROCEDURE — 77001 FLUOROGUIDE FOR VEIN DEVICE: CPT

## 2018-01-05 PROCEDURE — 85025 COMPLETE CBC W/AUTO DIFF WBC: CPT

## 2018-01-05 PROCEDURE — 83690 ASSAY OF LIPASE: CPT

## 2018-01-05 PROCEDURE — 36569 INSJ PICC 5 YR+ W/O IMAGING: CPT

## 2018-01-05 PROCEDURE — 36415 COLL VENOUS BLD VENIPUNCTURE: CPT

## 2018-01-05 PROCEDURE — 82550 ASSAY OF CK (CPK): CPT

## 2018-01-05 PROCEDURE — 87040 BLOOD CULTURE FOR BACTERIA: CPT

## 2018-01-05 PROCEDURE — 80048 BASIC METABOLIC PNL TOTAL CA: CPT

## 2018-01-05 PROCEDURE — 87070 CULTURE OTHR SPECIMN AEROBIC: CPT

## 2018-01-05 PROCEDURE — 81001 URINALYSIS AUTO W/SCOPE: CPT

## 2018-01-05 PROCEDURE — 87205 SMEAR GRAM STAIN: CPT

## 2018-01-05 PROCEDURE — 80053 COMPREHEN METABOLIC PANEL: CPT

## 2018-01-05 PROCEDURE — 71046 X-RAY EXAM CHEST 2 VIEWS: CPT

## 2018-01-05 PROCEDURE — 99285 EMERGENCY DEPT VISIT HI MDM: CPT

## 2018-01-05 PROCEDURE — 94760 N-INVAS EAR/PLS OXIMETRY 1: CPT

## 2018-01-05 PROCEDURE — 93005 ELECTROCARDIOGRAM TRACING: CPT

## 2018-01-05 PROCEDURE — 76937 US GUIDE VASCULAR ACCESS: CPT

## 2018-01-05 PROCEDURE — 70450 CT HEAD/BRAIN W/O DYE: CPT

## 2018-01-05 PROCEDURE — 83036 HEMOGLOBIN GLYCOSYLATED A1C: CPT

## 2018-01-05 PROCEDURE — 82140 ASSAY OF AMMONIA: CPT

## 2018-01-05 RX ADMIN — MAGNESIUM SULFATE IN DEXTROSE SCH MLS/HR: 10 INJECTION, SOLUTION INTRAVENOUS at 22:01

## 2018-01-05 RX ADMIN — MAGNESIUM SULFATE IN DEXTROSE SCH MLS/HR: 10 INJECTION, SOLUTION INTRAVENOUS at 23:40

## 2018-01-05 RX ADMIN — ACETAMINOPHEN PRN MG: 325 TABLET, FILM COATED ORAL at 23:39

## 2018-01-05 RX ADMIN — LEVOFLOXACIN SCH MLS/HR: 750 INJECTION, SOLUTION INTRAVENOUS at 19:59

## 2018-01-05 RX ADMIN — INSULIN ASPART SCH UNIT: 100 INJECTION, SOLUTION INTRAVENOUS; SUBCUTANEOUS at 21:58

## 2018-01-05 RX ADMIN — DEXTROSE MONOHYDRATE SCH MLS/HR: 5 INJECTION, SOLUTION INTRAVENOUS at 21:10

## 2018-01-05 RX ADMIN — LATANOPROST SCH DROPS: 50 SOLUTION OPHTHALMIC at 20:02

## 2018-01-05 RX ADMIN — BUDESONIDE AND FORMOTEROL FUMARATE DIHYDRATE SCH PUFF: 160; 4.5 AEROSOL RESPIRATORY (INHALATION) at 20:05

## 2018-01-05 RX ADMIN — Medication SCH MG: at 20:02

## 2018-01-05 NOTE — CT
EXAMINATION TYPE: CT brain wo con

 

DATE OF EXAM: 1/5/2018

 

COMPARISON: NONE

 

HISTORY: Weakness and fall.

 

CT DLP: 857.1 mGycm

Automated exposure control for dose reduction was used.

 

FINDINGS: 

There is mild hypodensity in the periventricular white matter. There is no mass effect nor midline sh
ift. There is no sign of intracranial hemorrhage. The calvarium is intact.

 

IMPRESSION: 

MILD CHRONIC SMALL VESSEL ISCHEMIA. NO ACUTE INTRACRANIAL ABNORMALITY.
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
## 2018-01-05 NOTE — XR
EXAMINATION TYPE: XR chest 2V

 

DATE OF EXAM: 1/5/2018

 

COMPARISON: 12/4/2017

 

HISTORY: Altered mental status

 

TECHNIQUE:  Frontal and lateral views of the chest are obtained.

 

FINDINGS:  There is some wedge-shaped consolidation in the right upper lobe in the posterior segment.
 There is small linear density at the left lung base. There is slightly elevated left diaphragm. Ther
e is no heart failure.

 

IMPRESSION:  There is right upper lobe pneumonia that is essentially new compared to last exam. Mild 
subsegmental atelectasis at the left lung base. No heart failure seen.

## 2018-01-05 NOTE — HP
HISTORY AND PHYSICAL



DATE OF ADMISSION:

2018.



PRESENTING COMPLAINT:

Delirious.



HISTORY OF PRESENT COMPLAINT:

This is a pleasant 72-year-old patient, well known to me, being followed by Dr. Hernández.

The patient was discharged from hospital on 2017 and went to [facility] and on

 got back home.  The patient's chronic stable medical conditions include

sigmoid diverticulosis, chronic respiratory failure on 3 L oxygen at home from

underlying COPD, obesity hypoventilation syndrome, element of cor pulmonale,  diabetes

mellitus, type 2, chronic thromboembolic lung disease for which patient has been on

Xarelto, chronic low back pain from spinal stenosis, lower extremity lymphedema, some

urinary stress incontinence,  chronic gait dysfunction, uses a walker, restless legs

syndrome.  History of splenectomy,  also had bilateral lower extremity cellulitis.  The

patient has been going to rehab after coming home from [facility].  For the last 24 hours

patient became more tired, somewhat morning becoming delirious.  Has got a slight

cough.  According to , actually the leg is looking better, which has got chronic

cellulitis.  The patient is often delirious during history giving and is also having

some chills.



REVIEW OF SYSTEMS:

Constitutional:  Tired.  Chills.  HEENT none.  Respiratory some cough, minimal

wheezing.  Cardiovascular none.  Gastrointestinal none.  Genitourinary:  Incontinence.

Dermatological:  Chronic lower extremity skin changes.  Lymphatics:  Lower extremity

leg swelling.  Psychiatry:  Anxiety and delirious currently.  Neurological as above.



PAST MEDICAL HISTORY:

Chronic hypoxic respiratory failure, COPD, diabetes, chronic thromboembolic disease per

lung biopsy, bilateral lower extremity lymphedema, chronic urinary incontinence, gait

dysfunction, uses a walker.  Restless legs syndrome.  Splenectomy.  Chronic low back

pain.  Spinal stenosis,  perforated bowel ulcer, uterine cancer, left leg wounds.



PAST SURGICAL HISTORY:

Appendectomy, cholecystectomy, hysterectomy and splenectomy.



SOCIAL HISTORY:

.  Smoked a pack a day for 55 years, stopped about a year ago.



FAMILY HISTORY:

Mother  age of 69 and had asthma.



HOME MEDICATIONS:

1. Potassium 10 mEq a day.

2. Insulin lispro per scale.

3. Lasix 80 mg a day.

4. Requip 0.5 mg q.h.s.

5. Zaroxolyn 2.5 mg a day.

6. Requip 0.25 p.o. b.i.d.

7. Silvadene cream 1% topical b.i.d.

8. Prednisone 10 mg t.i.d.

9. Xarelto 20 mg p.o. daily.

10.Melatonin 5 mg p.o. q.h.s.

11.Xalatan 0.005% 1 drop to both eyes q.h.s.

12.DuoNeb 3 mL q.6h p.r.n.

13.Insulin lispro before supper and breakfast.

14.Hemorrhoid cream topical p.r.n.

15.Amaryl 0.5 mg p.o. daily.

16.Ferrous sulfate 325 p.o. b.i.d.

17.Vitamin B12 5000 mcg p.o. daily.

18.Vitamin D3 5000 units p.o. q.h.s.

19.Symbicort 160/4.5, 2 puffs b.i.d.

20.Ventolin HFA 2 puffs q.4h p.r.n.

21.Tudorza 1 puff b.i.d.



ALLERGIES:

None.



PHYSICAL EXAMINATION:

Vital signs on presentation:  Temperature 101.1, pulse 84, respiratory 18, blood

pressure 130/62, pulse ox 90% on room air. General appearance:  Well-built, morbidly

obese.  BMI greater than 50.  Sitting up on bed.  Delirious.  Eyes:  Pupils equal.

Conjunctivae normal.  HEENT:  Oral cavity normal.  Neck short thick, JVD unable to

assess.  Mass not palpable.  Respiratory effort increased.

LUNGS:  Diminished breath sounds.  Cardiovascular,  HEART:  Sounds are distant.  Some

edema is present.

ABDOMEN:  Soft, nontender.  Liver and spleen not palpable.  Lymphatics:  No lymph nodes

palpable in neck or axillae.

Psychiatry:  The patient is somewhat delirious, able to speak in between.  Mentation

fluctuating.  Lower extremities:  There is edema of the lower extremity.  Some redness

is present below the knee.



INVESTIGATIONS:

White count 31.6, hemoglobin 10.5.  Chest x-ray results are pending and unable to

review the films.



ASSESSMENT:

1. Acute sepsis.  The patient could have underlying pneumonia or could be from

    cellulitis of lower extremity causing acute delirium.

2. Chronic sigmoid diverticulosis.

3. Chronic hypoxic respiratory failure on 3 L oxygen at home from underlying chronic

    obstructive pulmonary disease.

4. Chronic obstructive pulmonary disease in an ex-smoker.

5. Obesity hypoventilation syndrome.

6. Possible cor pulmonale from obesity hypoventilation syndrome and chronic

    obstructive pulmonary disease and chronic thromboembolic lung disease.

7. Diabetes mellitus type 2, chronically on insulin.

8. Biopsy confirmed thrombotic lung disease for which patient had been on Xarelto.

9. Chronic low back pain from spinal stenosis.

10.Chronic bilateral lower extremity lymphedema.

11.Chronic urinary stress incontinence.

12.Chronic gait dysfunction uses a walker.

13.Chronic restless leg syndrome.

14.History of splenectomy.

15.Chronic diverticulosis.

16.Chronic bilateral lower extremity cellulitis, probably with acute component.



PLAN:

Home medications are resumed.  Blood cultures will be done.  The patient will be given

IV fluids.  The patient's diuretics will be held for right now.  We will have ID

evaluate the patient.  We will also have Dr. Chino's team to see the patient.  Care was

discussed with the patient's  at the bedside. Copy to Dr. Hernández.





CYNTHIA / MADELEINE: [id number] / Job#: [id number]

## 2018-01-06 LAB
ANION GAP SERPL CALC-SCNC: 10 MMOL/L
ANION GAP SERPL CALC-SCNC: 7 MMOL/L
BUN SERPL-SCNC: 52 MG/DL (ref 7–17)
BUN SERPL-SCNC: 56 MG/DL (ref 7–17)
CALCIUM SPEC-MCNC: 8.1 MG/DL (ref 8.4–10.2)
CALCIUM SPEC-MCNC: 9.3 MG/DL (ref 8.4–10.2)
CELLS COUNTED: 200
CHLORIDE SERPL-SCNC: 88 MMOL/L (ref 98–107)
CHLORIDE SERPL-SCNC: 95 MMOL/L (ref 98–107)
CO2 SERPL-SCNC: 34 MMOL/L (ref 22–30)
CO2 SERPL-SCNC: 39 MMOL/L (ref 22–30)
ERYTHROCYTE [DISTWIDTH] IN BLOOD BY AUTOMATED COUNT: 3.23 M/UL (ref 3.8–5.4)
ERYTHROCYTE [DISTWIDTH] IN BLOOD: 22.9 % (ref 11.5–15.5)
GLUCOSE BLD-MCNC: 170 MG/DL (ref 75–99)
GLUCOSE BLD-MCNC: 191 MG/DL (ref 75–99)
GLUCOSE BLD-MCNC: 219 MG/DL (ref 75–99)
GLUCOSE BLD-MCNC: 249 MG/DL (ref 75–99)
GLUCOSE BLD-MCNC: 268 MG/DL (ref 75–99)
GLUCOSE BLD-MCNC: 284 MG/DL (ref 75–99)
GLUCOSE BLD-MCNC: 292 MG/DL (ref 75–99)
GLUCOSE BLD-MCNC: 299 MG/DL (ref 75–99)
GLUCOSE SERPL-MCNC: 215 MG/DL (ref 74–99)
GLUCOSE SERPL-MCNC: 237 MG/DL (ref 74–99)
HBA1C MFR BLD: 7.5 % (ref 4–6)
HCT VFR BLD AUTO: 29.5 % (ref 34–46)
HGB BLD-MCNC: 8.8 GM/DL (ref 11.4–16)
LYMPHOCYTES # BLD MANUAL: 2.4 K/UL (ref 1–4.8)
MAGNESIUM SPEC-SCNC: 2.2 MG/DL (ref 1.6–2.3)
MCH RBC QN AUTO: 27.3 PG (ref 25–35)
MCHC RBC AUTO-ENTMCNC: 29.9 G/DL (ref 31–37)
MCV RBC AUTO: 91.3 FL (ref 80–100)
METAMYELOCYTES # BLD: 0.34 K/UL
MONOCYTES # BLD MANUAL: 2.4 K/UL (ref 0–1)
NEUTROPHILS NFR BLD MANUAL: 83 %
NEUTS SEG # BLD MANUAL: 29.4 K/UL (ref 1.3–7.7)
PLATELET # BLD AUTO: 225 K/UL (ref 150–450)
POTASSIUM SERPL-SCNC: 3.7 MMOL/L (ref 3.5–5.1)
POTASSIUM SERPL-SCNC: 4.2 MMOL/L (ref 3.5–5.1)
SODIUM SERPL-SCNC: 136 MMOL/L (ref 137–145)
SODIUM SERPL-SCNC: 137 MMOL/L (ref 137–145)
WBC # BLD AUTO: 34.3 K/UL (ref 3.8–10.6)

## 2018-01-06 RX ADMIN — IPRATROPIUM BROMIDE AND ALBUTEROL SULFATE PRN ML: .5; 3 SOLUTION RESPIRATORY (INHALATION) at 20:23

## 2018-01-06 RX ADMIN — RIVAROXABAN SCH MG: 10 TABLET, FILM COATED ORAL at 08:54

## 2018-01-06 RX ADMIN — BUDESONIDE AND FORMOTEROL FUMARATE DIHYDRATE SCH PUFF: 160; 4.5 AEROSOL RESPIRATORY (INHALATION) at 08:32

## 2018-01-06 RX ADMIN — INSULIN ASPART SCH UNIT: 100 INJECTION, SOLUTION INTRAVENOUS; SUBCUTANEOUS at 12:17

## 2018-01-06 RX ADMIN — Medication SCH MG: at 20:15

## 2018-01-06 RX ADMIN — CEFAZOLIN SCH MLS/HR: 330 INJECTION, POWDER, FOR SOLUTION INTRAMUSCULAR; INTRAVENOUS at 10:00

## 2018-01-06 RX ADMIN — GLIMEPIRIDE SCH MG: 1 TABLET ORAL at 08:53

## 2018-01-06 RX ADMIN — CEFAZOLIN SCH MLS/HR: 330 INJECTION, POWDER, FOR SOLUTION INTRAMUSCULAR; INTRAVENOUS at 23:28

## 2018-01-06 RX ADMIN — INSULIN ASPART SCH UNIT: 100 INJECTION, SOLUTION INTRAVENOUS; SUBCUTANEOUS at 06:35

## 2018-01-06 RX ADMIN — DEXTROSE MONOHYDRATE SCH MLS/HR: 5 INJECTION, SOLUTION INTRAVENOUS at 12:18

## 2018-01-06 RX ADMIN — DEXTROSE MONOHYDRATE SCH MLS/HR: 5 INJECTION, SOLUTION INTRAVENOUS at 03:18

## 2018-01-06 RX ADMIN — INSULIN ASPART SCH: 100 INJECTION, SOLUTION INTRAVENOUS; SUBCUTANEOUS at 17:35

## 2018-01-06 RX ADMIN — MAGNESIUM SULFATE IN DEXTROSE SCH MLS/HR: 10 INJECTION, SOLUTION INTRAVENOUS at 00:42

## 2018-01-06 RX ADMIN — GLIMEPIRIDE SCH: 1 TABLET ORAL at 06:33

## 2018-01-06 RX ADMIN — IPRATROPIUM BROMIDE AND ALBUTEROL SULFATE PRN ML: .5; 3 SOLUTION RESPIRATORY (INHALATION) at 13:25

## 2018-01-06 RX ADMIN — IPRATROPIUM BROMIDE AND ALBUTEROL SULFATE PRN ML: .5; 3 SOLUTION RESPIRATORY (INHALATION) at 08:32

## 2018-01-06 RX ADMIN — LATANOPROST SCH DROPS: 50 SOLUTION OPHTHALMIC at 20:15

## 2018-01-06 RX ADMIN — INSULIN HUMAN SCH MLS/HR: 100 INJECTION, SOLUTION PARENTERAL at 18:04

## 2018-01-06 RX ADMIN — DEXTROSE MONOHYDRATE SCH MLS/HR: 5 INJECTION, SOLUTION INTRAVENOUS at 21:23

## 2018-01-06 RX ADMIN — BUDESONIDE AND FORMOTEROL FUMARATE DIHYDRATE SCH PUFF: 160; 4.5 AEROSOL RESPIRATORY (INHALATION) at 20:23
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
## 2018-01-06 NOTE — P.PN
Progress Note - Text


Progress Note Date: 01/06/18


DATE OF SERVICE: 


01/06/2018





PRESENTING COMPLAINT:


Delirium





HISTORY OF PRESENT ILLNESS:


72-year-old female who was discharged recently went to [facility], and was 

discharged from there on December 30 back to her home.  Had continued 

outpatient rehab after being discharged from [facility] over the last 24 hours 

patient became increasingly tired somewhat more delirious, continues to have 

lower extremity cellulitis which has greatly improved however still present, a 

cough developed with chills and was brought in for further evaluation found to 

be septic.  Admitted for the same.





INTERVAL HISTORY:


01/06/2018:


Patient lying in bed appears somewhat comfortable able to answer simple 

straightforward questions, ate a small amount of her breakfast between 10 and 20

%, is able to get up with assistance and walker, cough is present no sputum 

production.  Currently afebrile, last BM prior to admission.








REVIEW OF SYSTEMS:


Done for constitutional ,cardiovascular, GI, pulmonary with relevant findings 

as above.





CURRENT MEDICATIONS


Acetaminophen, DuoNeb, Symbicort, Amaryl 0.5 mg by mouth at breakfast, sliding 

scale, Mccanna post one drop both eyes at bedtime, levofloxacin 750 mg IV 

piggyback, melatonin 5 mg by mouth at bedtime, Zosyn 3.375 g in IVP back, 

prednisone, Xarelto, Requip.





PHYSICAL EXAM


VITAL SIGNS: 


Temperature 98.4, pulse 88, respiratory rate 20, blood pressure 107/57, oxygen 

saturation 95% on 4 L.





GENERAL APPEARANCE: Sitting up in bed, not in distress. 


EYES: Pupils equal. Conjunctiva normal. 


NECK: JVD not raised. Mass not palpable. 


RESPIRATORY: Respiratory effort increased. Lungs diminished to auscultation. 


CARDIOVASCULAR: First and second sounds normal.  Mild cardiomegaly yesterday 

and spine are no problem by edema. 


ABDOMEN: Soft. Liver and spleen not palpable. No tenderness. No mass palpable. 


PSYCHIATRY: Able to answer simple straightforward questions is confused from 

time to time.  Mood and affect normal. 


INTEGUMENT: Mild edema to both lower extremities redness present below the 

knees bilaterally.





INVESTIGATIONS:


LABS: White blood cell count 34.3, hemoglobin 8.8, sodium 136, chloride 95, 

carbon dioxide 34, BUN 52, creatinine 1.35,


Blood cultures: Gram-negative rods 2





ASSESSMENT:


-Acute sepsis, likely due to pneumonia causing acute delirium, slow to respond


-Acute delirium/metabolic encephalopathy likely due to acute sepsis,improving


-Acute right upper lobe pneumonia, rule out healthcare associated pneumonia due 

to frequent hospitalizations, ECF admissions.


-Acute Leukocytosis secondary to acute right upper lobe pneumonia


-Acute kidney injury secondary to sepsis/pneumonia


-Chronic sigmoid diverticulosis.


-Chronic hypoxic respiratory failure on 3 L of oxygen at home from underlying 

chronic obstructive pulmonary disease.


-Chronic obstructive pulmonary disease in an ex-smoker.


-Obesity hypoventilation syndrome.


-Possible cor pulmonale from obesity hypoventilation syndrome and chronic 

obstructive pulmonary disease and chronic thromboembolic lung disease.


-Diabetes mellitus type 2 chronically on insulin.


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 

Xarelto.


-Chronic low back pain from spinal stenosis.


-Chronic bilateral lower extremity lymphedema.


-Chronic urinary stress incontinence.


-Chronic gait dysfunction uses a walker.


-Chronic restless leg syndrome.


-Chronic history of splenectomy.


-Chronic diverticulosis.


-Chronic bilateral lower extremity cellulitis probably with acute component.





PLAN:


Blood cultures positive for gram-negative bacilli, infectious disease consulted 

will continue current antibiotic therapy plan and wait for additional input 

from ID.  Await sputum Gram stain and culture.  Continue bronchodilators and 

prednisone with daily chest x-rays.  Plan of care discussed at the bedside with 

the  and patient they are in agreement.  We will follow closely.





NP statement: Patient was seen and examined by nurse practitioner Priscilla Martinez and all elements of the case discussed with attending  Dr. Villa

## 2018-01-06 NOTE — PN
PROGRESS NOTE



DATE OF SERVICE:

01/06/18.



ATTENDING NOTE:

The patient seen and examined by me. I discussed with nurse practitioner, Ms. Martinez.

Patient admitted yesterday with acute delirium and right upper lobe pneumonia, more

awake today.  Not much of an appetite.  Tired. A little bit short of breath.  

at the bedside.



EXAMINATION:

T-max 102.2, pulse 88, respirations 20, blood pressure 107/57, pulse 95% on 4 L.  on

exam, sitting up, very tired-appearing.  Lungs decreased breath sounds, lethargic, but

answers questions.  More awake than yesterday.



INVESTIGATIONS:

White count 34.3, hemoglobin 8.8, potassium 4.2, BUN 32, creatinine 1.35.



ASSESSMENT:

1. Acute sepsis due to right upper lobe pneumonia, severe, slow to respond.

2. Multiple other medical problems.

3. Acute delirium/metabolic encephalopathy on presentation, the patient's blood

    cultures are growing gram-negative bacilli.

The patient is on IV Zosyn, Levaquin, sugars running high, put on IV insulin drip.

Getting IV fluids.  Care was discussed with the patient and  at the bedside.

Follow.





MMODL / IJN: [id number] / Job#: [id number]

## 2018-01-06 NOTE — XR
EXAMINATION TYPE: XR chest 2V

 

DATE OF EXAM: 1/6/2018

 

HISTORY: pneumonia.

 

REFERENCE: Previous study dated 1/5/2018.

 

FINDINGS: There is a continuing right-sided infiltrate which is believed to be in the superior segmen
t of the right lower lobe. There is atelectatic change present in the left lung. Pleural spaces are c
lear. Heart size is upper limits of normal.

 

IMPRESSION: 

1. CONTINUING RIGHT-SIDED PNEUMONIA.

2. BORDERLINE CARDIOMEGALY.

## 2018-01-07 LAB
ANION GAP SERPL CALC-SCNC: 8 MMOL/L
BUN SERPL-SCNC: 46 MG/DL (ref 7–17)
CALCIUM SPEC-MCNC: 8.2 MG/DL (ref 8.4–10.2)
CELLS COUNTED: 100
CHLORIDE SERPL-SCNC: 99 MMOL/L (ref 98–107)
CO2 SERPL-SCNC: 32 MMOL/L (ref 22–30)
EOSINOPHIL # BLD MANUAL: 0.3 K/UL (ref 0–0.7)
ERYTHROCYTE [DISTWIDTH] IN BLOOD BY AUTOMATED COUNT: 3.2 M/UL (ref 3.8–5.4)
ERYTHROCYTE [DISTWIDTH] IN BLOOD: 22.8 % (ref 11.5–15.5)
GLUCOSE BLD-MCNC: 114 MG/DL (ref 75–99)
GLUCOSE BLD-MCNC: 167 MG/DL (ref 75–99)
GLUCOSE BLD-MCNC: 173 MG/DL (ref 75–99)
GLUCOSE BLD-MCNC: 178 MG/DL (ref 75–99)
GLUCOSE BLD-MCNC: 181 MG/DL (ref 75–99)
GLUCOSE BLD-MCNC: 244 MG/DL (ref 75–99)
GLUCOSE BLD-MCNC: 259 MG/DL (ref 75–99)
GLUCOSE BLD-MCNC: 348 MG/DL (ref 75–99)
GLUCOSE SERPL-MCNC: 140 MG/DL (ref 74–99)
HCT VFR BLD AUTO: 28.9 % (ref 34–46)
HGB BLD-MCNC: 8.8 GM/DL (ref 11.4–16)
LYMPHOCYTES # BLD MANUAL: 3.01 K/UL (ref 1–4.8)
MCH RBC QN AUTO: 27.4 PG (ref 25–35)
MCHC RBC AUTO-ENTMCNC: 30.3 G/DL (ref 31–37)
MCV RBC AUTO: 90.4 FL (ref 80–100)
MONOCYTES # BLD MANUAL: 1.51 K/UL (ref 0–1)
NEUTROPHILS NFR BLD MANUAL: 84 %
NEUTS SEG # BLD MANUAL: 25.28 K/UL (ref 1.3–7.7)
PLATELET # BLD AUTO: 238 K/UL (ref 150–450)
POTASSIUM SERPL-SCNC: 3.2 MMOL/L (ref 3.5–5.1)
SODIUM SERPL-SCNC: 139 MMOL/L (ref 137–145)
WBC # BLD AUTO: 30.1 K/UL (ref 3.8–10.6)

## 2018-01-07 RX ADMIN — Medication SCH MG: at 20:13

## 2018-01-07 RX ADMIN — INSULIN HUMAN SCH MLS/HR: 100 INJECTION, SOLUTION PARENTERAL at 03:52

## 2018-01-07 RX ADMIN — IPRATROPIUM BROMIDE AND ALBUTEROL SULFATE PRN ML: .5; 3 SOLUTION RESPIRATORY (INHALATION) at 02:53

## 2018-01-07 RX ADMIN — INSULIN ASPART SCH UNIT: 100 INJECTION, SOLUTION INTRAVENOUS; SUBCUTANEOUS at 22:07

## 2018-01-07 RX ADMIN — INSULIN ASPART SCH: 100 INJECTION, SOLUTION INTRAVENOUS; SUBCUTANEOUS at 12:10

## 2018-01-07 RX ADMIN — BUDESONIDE AND FORMOTEROL FUMARATE DIHYDRATE SCH PUFF: 160; 4.5 AEROSOL RESPIRATORY (INHALATION) at 21:35

## 2018-01-07 RX ADMIN — DEXTROSE MONOHYDRATE SCH MLS/HR: 5 INJECTION, SOLUTION INTRAVENOUS at 20:14

## 2018-01-07 RX ADMIN — DEXTROSE MONOHYDRATE SCH MLS/HR: 5 INJECTION, SOLUTION INTRAVENOUS at 12:11

## 2018-01-07 RX ADMIN — RIVAROXABAN SCH MG: 10 TABLET, FILM COATED ORAL at 09:18

## 2018-01-07 RX ADMIN — INSULIN ASPART SCH UNIT: 100 INJECTION, SOLUTION INTRAVENOUS; SUBCUTANEOUS at 17:17

## 2018-01-07 RX ADMIN — INSULIN ASPART SCH UNIT: 100 INJECTION, SOLUTION INTRAVENOUS; SUBCUTANEOUS at 08:28

## 2018-01-07 RX ADMIN — LEVOFLOXACIN SCH MLS/HR: 750 INJECTION, SOLUTION INTRAVENOUS at 20:14

## 2018-01-07 RX ADMIN — CEFAZOLIN SCH MLS/HR: 330 INJECTION, POWDER, FOR SOLUTION INTRAMUSCULAR; INTRAVENOUS at 16:42

## 2018-01-07 RX ADMIN — LATANOPROST SCH DROPS: 50 SOLUTION OPHTHALMIC at 20:13

## 2018-01-07 RX ADMIN — DEXTROSE MONOHYDRATE SCH MLS/HR: 5 INJECTION, SOLUTION INTRAVENOUS at 03:36

## 2018-01-07 RX ADMIN — IPRATROPIUM BROMIDE AND ALBUTEROL SULFATE PRN ML: .5; 3 SOLUTION RESPIRATORY (INHALATION) at 07:15

## 2018-01-07 RX ADMIN — BUDESONIDE AND FORMOTEROL FUMARATE DIHYDRATE SCH PUFF: 160; 4.5 AEROSOL RESPIRATORY (INHALATION) at 07:15

## 2018-01-07 NOTE — P.PN
Progress Note - Text


Progress Note Date: 01/07/18


DATE OF SERVICE: 


01/07/2018





PRESENTING COMPLAINT:


Delirium





HISTORY OF PRESENT ILLNESS:


72-year-old female who was discharged recently went to [facility], and was 

discharged from there on December 30 back to her home.  Had continued 

outpatient rehab after being discharged from [facility] over the last 24 hours 

patient became increasingly tired somewhat more delirious, continues to have 

lower extremity cellulitis which has greatly improved however still present, a 

cough developed with chills and was brought in for further evaluation found to 

be septic.  Admitted for the same.





INTERVAL HISTORY:


01/07/2018:


Patient sitting up in a chair states she feels much better afebrile, has a cough

, no sputum production and some wheezing, she is awake alert able to answer 

questions.  Ate about 35-50% of her diet for breakfast states her appetite is 

improving.  Up with assistance.  Lower extremities remain moderately reddened 

but also are improving.  Moved her bowels.





01/06/2018:


Patient lying in bed appears somewhat comfortable able to answer simple 

straightforward questions, ate a small amount of her breakfast between 10 and 20

%, is able to get up with assistance and walker, cough is present no sputum 

production.  Currently afebrile, last BM prior to admission.








REVIEW OF SYSTEMS:


Done for constitutional ,cardiovascular, GI, pulmonary with relevant findings 

as above.





CURRENT MEDICATIONS


Acetaminophen, DuoNeb, Symbicort, sliding scale, latanoprost one drop both eyes 

at bedtime, levofloxacin 750 mg IV piggyback, melatonin 5 mg by mouth at bedtime

, Zosyn 3.375 g in IVP back, prednisone, Xarelto, Requip.





PHYSICAL EXAM


VITAL SIGNS: 


Temperature 97.2, pulse 87, respiratory rate 20, blood pressure 120/58, oxygen 

saturation 96% on 4 L.





GENERAL APPEARANCE: Sitting up in chair not in distress. 


EYES: Pupils equal. Conjunctiva normal. 


NECK: JVD not raised. Mass not palpable. 


RESPIRATORY: Respiratory effort increased. Lungs diminished to auscultation. 


CARDIOVASCULAR: First and second sounds normal.  Mild  edema. 


ABDOMEN: Soft. Liver and spleen not palpable. No tenderness. No mass palpable. 


PSYCHIATRY: Able to answer simple straightforward questions no confusion today.

  Mood and affect normal. 


INTEGUMENT: Mild edema to both lower extremities redness present below the 

knees bilaterally.





INVESTIGATIONS:


LABS: White blood cell count 30.1, hemoglobin 8.8, potassium 3.2, BUN 46, 

creatinine 1.30, Accu-Cheks noted.


Blood cultures: Gram-negative bacilli 2





ASSESSMENT:


-Acute sepsis, likely due to pneumonia causing acute delirium, improving


-Acute delirium/metabolic encephalopathy likely due to acute sepsis,improving


-Acute bacterial right upper lobe pneumonia, with cultures positive for gram-

negative bacilli, causing sepsis, improving


-Acute Leukocytosis secondary to acute right upper lobe pneumonia


-Acute kidney injury secondary to sepsis/pneumonia


-Chronic sigmoid diverticulosis.


-Chronic hypoxic respiratory failure on 3 L of oxygen at home from underlying 

chronic obstructive pulmonary disease.


-Chronic obstructive pulmonary disease in an ex-smoker.


-Obesity hypoventilation syndrome.


-Possible cor pulmonale from obesity hypoventilation syndrome and chronic 

obstructive pulmonary disease and chronic thromboembolic lung disease.


-Diabetes mellitus type 2 chronically on insulin.


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 

Xarelto.


-Chronic low back pain from spinal stenosis.


-Chronic bilateral lower extremity lymphedema.


-Chronic urinary stress incontinence.


-Chronic gait dysfunction uses a walker.


-Chronic restless leg syndrome.


-Chronic history of splenectomy.


-Chronic diverticulosis.


-Chronic bilateral lower extremity cellulitis probably with acute component.





PLAN:


Await sputum for Gram stain culture and sensitivity continue Zosyn and Levaquin 

which will provide adequate coverage for gram-negative bacilli.  Continue 

bronchodilators and prednisone with daily chest x-rays.  Plan of care discussed 

at the bedside with the  and patient they are in agreement.  We will 

follow closely.





NP statement: Patient was seen and examined by nurse practitioner Priscilla Martinez and all elements of the case discussed with attending  Dr. Villa

## 2018-01-07 NOTE — P.PN
Subjective


Progress Note Date: 01/07/18








Assessment 2-year-old female patient with known history of advanced oxygen-

dependent COPD, biopsy-proven bronchiolitis obliterans with organizing 

pneumonia and morbid obesity was gained significant amount of weight as the 

patient got treated with systemic steroids for BOOP.  The patient has had 

multiple hospitalization for a story complications.  The patient has had been 

evaluated by my partner Dr. Chino.  Following her most hospitalization the 

[place] [facility] [place] and following 

that she was discharged home.  After staying home for around a week the patient 

started having increased cough congestion and wheezing and she was having 

increased shortness of breath.  For that reason she came back to the hospital 

and she was found to have a right upper lobe pneumonia which is up a new onset.

  She was started on examination Zosyn and Levaquin and a pulmonary 

consultation was requested.  The patient has had a chest x-ray at time of 

admission and subsequent chest x-ray from this morning on 01/06/2018 showed 

right upper lobe pneumonia.  She was also started on DuoNeb nebulized 

treatments around the clock.  She has been on 20 mg of prednisone at a time of 

her discharge and this has been as part of a burst taper.  Her current 

prednisone dose is at 10 mg by mouth 3 times a day.





On 01/07/2018 the patient is being seen for a follow-up.  As mentioned earlier 

the patient has an extensive right upper lobe pneumonia in addition to an 

underlying COPD and bronchiolitis obliterans with organizing pneumonia.  The 

patient's blood culture was positive for gram-negative bacillus.  The patient 

is currently on a combination of Zosyn and Levaquin.  This is most likely a gram

-negative bacteria and the final microbiology still pending for now.  No 

pleurisy.  No hemoptysis.  No chest pain.  Clinically the patient is feeling 

better.  She has regained some of her strength back.  She was able to sit up on 

a chair.  She is tolerating her diet.  No nausea or vomiting.  No altered 

mentation.  She is currently on a prednisone which was At a dose of 30 mg on a 

daily basis.





Objective





- Vital Signs


Vital signs: 


 Vital Signs











Temp  98.5 F   01/07/18 11:35


 


Pulse  89   01/07/18 11:35


 


Resp  20   01/07/18 11:35


 


BP  112/59   01/07/18 11:35


 


Pulse Ox  99   01/07/18 11:35








 Intake & Output











 01/06/18 01/07/18 01/07/18





 18:59 06:59 18:59


 


Intake Total 632.589 8496.962 397.100


 


Balance 254.527 7071.962 397.100


 


Weight  131.5 kg 


 


Intake:   


 


   950 


 


    Piperacillin-Tazobactam 3 50 50 





    .375 gm In Dextrose/Water   





    1 50ml.bag @ 12.5 mls/hr   





    IVPB Q8H NED Rx#:   





    [id number]   


 


    Sodium Chloride 0.9% 1, 600 900 





    000 ml @ 75 mls/hr IV .   





    [id number] NED Rx#:[id number]   


 


  Intake, IV Titration 6.599 67.962 37.100





  Amount   


 


    Insulin Regular 100 unit 6.599 67.962 37.100





    In Sodium Chloride 0.9%   





    100 ml @ Titrate IV .Q0M   





    NED Rx#:[id number]   


 


  Oral 240  360


 


Other:   


 


  Voiding Method Diaper Bedpan Bedpan





 Incontinent Diaper Diaper





  Incontinent Incontinent


 


  # Voids 1 2 














- Exam








GENERAL EXAM:  obese.  Alert, active, comfortable in no apparent distress.


HEAD: Normocephalic.


EYES: Normal reaction of pupils, equal size.


NOSE: Clear with pink turbinates.


THROAT: No erythema or exudates.


NECK: No masses, no JVD.


CHEST: No chest wall deformity.


LUNGS: Equal air entry with no crackles, wheeze, rhonchi or dullness.  The 

patient is prolongation of expiratory phase of breathing and diffuse expiratory 

wheezes heard throughout the lung fields bilaterally.


CVS: S1 and S2 normal with no audible murmur, regular rhythm.


ABDOMEN: No hepatosplenomegaly, normal bowel sounds, no guarding or rigidity.


SPINE: No scoliosis or deformity


SKIN: No rashes


CENTRAL NERVOUS SYSTEM: No focal deficits, tone is normal in all 4 extremities.


EXTREMITIES: There is changes of chronic peripheral edema.  No clubbing, no 

cyanosis.  Peripheral pulses are intact.








- Labs


CBC & Chem 7: 


 01/07/18 06:57





 01/07/18 06:57


Labs: 


 Abnormal Lab Results - Last 24 Hours (Table)











  01/05/18 01/06/18 01/06/18 Range/Units





  16:06 16:42 18:00 


 


WBC     (3.8-10.6)  k/uL


 


RBC     (3.80-5.40)  m/uL


 


Hgb     (11.4-16.0)  gm/dL


 


Hct     (34.0-46.0)  %


 


MCHC     (31.0-37.0)  g/dL


 


RDW     (11.5-15.5)  %


 


Neutrophils # (Manual)     (1.3-7.7)  k/uL


 


Monocytes # (Manual)     (0-1.0)  k/uL


 


Potassium     (3.5-5.1)  mmol/L


 


Carbon Dioxide     (22-30)  mmol/L


 


BUN     (7-17)  mg/dL


 


Creatinine     (0.52-1.04)  mg/dL


 


Glucose     (74-99)  mg/dL


 


POC Glucose (mg/dL)   292 H  299 H  (75-99)  mg/dL


 


Hemoglobin A1c  7.5 H    (4.0-6.0)  %


 


Calcium     (8.4-10.2)  mg/dL














  01/06/18 01/06/18 01/06/18 Range/Units





  18:30 19:01 21:23 


 


WBC     (3.8-10.6)  k/uL


 


RBC     (3.80-5.40)  m/uL


 


Hgb     (11.4-16.0)  gm/dL


 


Hct     (34.0-46.0)  %


 


MCHC     (31.0-37.0)  g/dL


 


RDW     (11.5-15.5)  %


 


Neutrophils # (Manual)     (1.3-7.7)  k/uL


 


Monocytes # (Manual)     (0-1.0)  k/uL


 


Potassium     (3.5-5.1)  mmol/L


 


Carbon Dioxide     (22-30)  mmol/L


 


BUN     (7-17)  mg/dL


 


Creatinine     (0.52-1.04)  mg/dL


 


Glucose     (74-99)  mg/dL


 


POC Glucose (mg/dL)  268 H  219 H  170 H  (75-99)  mg/dL


 


Hemoglobin A1c     (4.0-6.0)  %


 


Calcium     (8.4-10.2)  mg/dL














  01/06/18 01/07/18 01/07/18 Range/Units





  23:34 01:16 03:32 


 


WBC     (3.8-10.6)  k/uL


 


RBC     (3.80-5.40)  m/uL


 


Hgb     (11.4-16.0)  gm/dL


 


Hct     (34.0-46.0)  %


 


MCHC     (31.0-37.0)  g/dL


 


RDW     (11.5-15.5)  %


 


Neutrophils # (Manual)     (1.3-7.7)  k/uL


 


Monocytes # (Manual)     (0-1.0)  k/uL


 


Potassium     (3.5-5.1)  mmol/L


 


Carbon Dioxide     (22-30)  mmol/L


 


BUN     (7-17)  mg/dL


 


Creatinine     (0.52-1.04)  mg/dL


 


Glucose     (74-99)  mg/dL


 


POC Glucose (mg/dL)  191 H  181 H  178 H  (75-99)  mg/dL


 


Hemoglobin A1c     (4.0-6.0)  %


 


Calcium     (8.4-10.2)  mg/dL














  01/07/18 01/07/18 01/07/18 Range/Units





  05:27 06:57 06:57 


 


WBC   30.1 H*   (3.8-10.6)  k/uL


 


RBC   3.20 L   (3.80-5.40)  m/uL


 


Hgb   8.8 L   (11.4-16.0)  gm/dL


 


Hct   28.9 L   (34.0-46.0)  %


 


MCHC   30.3 L   (31.0-37.0)  g/dL


 


RDW   22.8 H   (11.5-15.5)  %


 


Neutrophils # (Manual)   25.28 H   (1.3-7.7)  k/uL


 


Monocytes # (Manual)   1.51 H   (0-1.0)  k/uL


 


Potassium    3.2 L  (3.5-5.1)  mmol/L


 


Carbon Dioxide    32 H  (22-30)  mmol/L


 


BUN    46 H  (7-17)  mg/dL


 


Creatinine    1.30 H  (0.52-1.04)  mg/dL


 


Glucose    140 H  (74-99)  mg/dL


 


POC Glucose (mg/dL)  173 H    (75-99)  mg/dL


 


Hemoglobin A1c     (4.0-6.0)  %


 


Calcium    8.2 L  (8.4-10.2)  mg/dL














  01/07/18 01/07/18 01/07/18 Range/Units





  07:38 09:08 11:24 


 


WBC     (3.8-10.6)  k/uL


 


RBC     (3.80-5.40)  m/uL


 


Hgb     (11.4-16.0)  gm/dL


 


Hct     (34.0-46.0)  %


 


MCHC     (31.0-37.0)  g/dL


 


RDW     (11.5-15.5)  %


 


Neutrophils # (Manual)     (1.3-7.7)  k/uL


 


Monocytes # (Manual)     (0-1.0)  k/uL


 


Potassium     (3.5-5.1)  mmol/L


 


Carbon Dioxide     (22-30)  mmol/L


 


BUN     (7-17)  mg/dL


 


Creatinine     (0.52-1.04)  mg/dL


 


Glucose     (74-99)  mg/dL


 


POC Glucose (mg/dL)  167 H  244 H  114 H  (75-99)  mg/dL


 


Hemoglobin A1c     (4.0-6.0)  %


 


Calcium     (8.4-10.2)  mg/dL








 Microbiology - Last 24 Hours (Table)











 01/05/18 18:21 Blood Culture Gram Stain - Preliminary





 Blood Blood Culture - Preliminary





    Gram Neg Bacilli


 


 01/05/18 16:06 Blood Culture Gram Stain - Preliminary





 Blood Blood Culture - Preliminary





    Gram Neg Bacilli


 


 01/05/18 18:21 Blood Culture - Final





 Blood 


 


 01/05/18 16:06 Blood Culture - Final





 Blood 














Assessment and Plan


Plan: 











Assessment





1 acute bacterial right upper lobe pneumonia, a gram-negative pneumonia and the 

patient is growing gram-negative bacillus in the blood.  The patient obviously 

has become septic from the pneumonia and the patient is currently on a 

combination of Zosyn and Levaquin.





2 acute sepsis with leukocytosis, white cell count remains elevated at 30.





3 acute kidney injury secondary to above





4 morbid obesity





5 COPD exacerbation secondary to above.  The patient also has chronic hypoxic 

respiratory failure.





6 acute lactic acidosis secondary to above, improving





7 bronchiolitis obliterans with organizing pneumonia maintained on systemic 

steroids





8 chronic steroid use with ongoing weight gain





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





10 restless leg syndrome





11 chronic lower extremity edema





12 chronic urinary incontinence





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





14 chronic diverticulosis





15 previous history of lower extremity cellulitis





16 possible cor pulmonale and obesity hypoventilation syndrome secondary to 

morbid obese body habitus





17 previous history of GI bleeding





Plan





Continue Zosyn and Levaquin.  Monitor the blood cultures.  Modify antibiotics 

accordingly.  Monitor white cell count.  Repeat chest x-ray with the next 24 

hours.  The patient is immunocompromised due to a previous splenectomy.  The 

patient also is immunocompromised because of chronic steroid use.  As such this 

may be an overwhelming infection with gram-negative bacteria and sepsis.  

Continue the combination of Zosyn and Levaquin and will continue to follow.  

Clinically improved.
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
## 2018-01-08 LAB
ANION GAP SERPL CALC-SCNC: 7 MMOL/L
BUN SERPL-SCNC: 43 MG/DL (ref 7–17)
CALCIUM SPEC-MCNC: 7.7 MG/DL (ref 8.4–10.2)
CELLS COUNTED: 200
CHLORIDE SERPL-SCNC: 97 MMOL/L (ref 98–107)
CO2 SERPL-SCNC: 33 MMOL/L (ref 22–30)
ERYTHROCYTE [DISTWIDTH] IN BLOOD BY AUTOMATED COUNT: 3.19 M/UL (ref 3.8–5.4)
ERYTHROCYTE [DISTWIDTH] IN BLOOD: 22.5 % (ref 11.5–15.5)
GLUCOSE BLD-MCNC: 256 MG/DL (ref 75–99)
GLUCOSE BLD-MCNC: 269 MG/DL (ref 75–99)
GLUCOSE BLD-MCNC: 299 MG/DL (ref 75–99)
GLUCOSE BLD-MCNC: 322 MG/DL (ref 75–99)
GLUCOSE SERPL-MCNC: 257 MG/DL (ref 74–99)
HCT VFR BLD AUTO: 29.6 % (ref 34–46)
HGB BLD-MCNC: 8.8 GM/DL (ref 11.4–16)
LYMPHOCYTES # BLD MANUAL: 1.03 K/UL (ref 1–4.8)
MCH RBC QN AUTO: 27.5 PG (ref 25–35)
MCHC RBC AUTO-ENTMCNC: 29.6 G/DL (ref 31–37)
MCV RBC AUTO: 92.9 FL (ref 80–100)
MONOCYTES # BLD MANUAL: 0.82 K/UL (ref 0–1)
NEUTROPHILS NFR BLD MANUAL: 92 %
NEUTS SEG # BLD MANUAL: 18.86 K/UL (ref 1.3–7.7)
PLATELET # BLD AUTO: 261 K/UL (ref 150–450)
POTASSIUM SERPL-SCNC: 3.7 MMOL/L (ref 3.5–5.1)
SODIUM SERPL-SCNC: 137 MMOL/L (ref 137–145)
WBC # BLD AUTO: 20.5 K/UL (ref 3.8–10.6)

## 2018-01-08 RX ADMIN — INSULIN ASPART SCH UNIT: 100 INJECTION, SOLUTION INTRAVENOUS; SUBCUTANEOUS at 18:09

## 2018-01-08 RX ADMIN — BUDESONIDE AND FORMOTEROL FUMARATE DIHYDRATE SCH PUFF: 160; 4.5 AEROSOL RESPIRATORY (INHALATION) at 19:39

## 2018-01-08 RX ADMIN — Medication SCH MG: at 22:30

## 2018-01-08 RX ADMIN — DEXTROSE MONOHYDRATE SCH MLS/HR: 5 INJECTION, SOLUTION INTRAVENOUS at 03:17

## 2018-01-08 RX ADMIN — DEXTROSE MONOHYDRATE SCH MLS/HR: 5 INJECTION, SOLUTION INTRAVENOUS at 11:23

## 2018-01-08 RX ADMIN — INSULIN ASPART SCH UNIT: 100 INJECTION, SOLUTION INTRAVENOUS; SUBCUTANEOUS at 22:30

## 2018-01-08 RX ADMIN — RIVAROXABAN SCH MG: 10 TABLET, FILM COATED ORAL at 08:40

## 2018-01-08 RX ADMIN — IPRATROPIUM BROMIDE AND ALBUTEROL SULFATE PRN ML: .5; 3 SOLUTION RESPIRATORY (INHALATION) at 09:13

## 2018-01-08 RX ADMIN — CEFAZOLIN SCH MLS/HR: 330 INJECTION, POWDER, FOR SOLUTION INTRAMUSCULAR; INTRAVENOUS at 00:32

## 2018-01-08 RX ADMIN — LATANOPROST SCH DROPS: 50 SOLUTION OPHTHALMIC at 22:30

## 2018-01-08 RX ADMIN — INSULIN DETEMIR SCH UNIT: 100 INJECTION, SOLUTION SUBCUTANEOUS at 08:40

## 2018-01-08 RX ADMIN — INSULIN ASPART SCH UNIT: 100 INJECTION, SOLUTION INTRAVENOUS; SUBCUTANEOUS at 06:29

## 2018-01-08 RX ADMIN — BUDESONIDE AND FORMOTEROL FUMARATE DIHYDRATE SCH PUFF: 160; 4.5 AEROSOL RESPIRATORY (INHALATION) at 09:13

## 2018-01-08 RX ADMIN — IPRATROPIUM BROMIDE AND ALBUTEROL SULFATE PRN ML: .5; 3 SOLUTION RESPIRATORY (INHALATION) at 15:40

## 2018-01-08 RX ADMIN — INSULIN ASPART SCH UNIT: 100 INJECTION, SOLUTION INTRAVENOUS; SUBCUTANEOUS at 12:30

## 2018-01-08 RX ADMIN — CEFAZOLIN SCH MLS/HR: 330 INJECTION, POWDER, FOR SOLUTION INTRAMUSCULAR; INTRAVENOUS at 15:01

## 2018-01-08 RX ADMIN — INSULIN ASPART SCH UNIT: 100 INJECTION, SOLUTION INTRAVENOUS; SUBCUTANEOUS at 18:08

## 2018-01-08 RX ADMIN — DEXTROSE MONOHYDRATE SCH MLS/HR: 5 INJECTION, SOLUTION INTRAVENOUS at 22:29

## 2018-01-08 NOTE — XR
EXAMINATION TYPE: XR chest 2V

 

DATE OF EXAM: 1/8/2018

 

COMPARISON: Prior chest x-ray 1/6/2018

 

HISTORY: Bacterial pneumonia

 

TECHNIQUE:  Frontal and lateral views of the chest are obtained.

 

FINDINGS:  Airspace disease persists on the right. Some probable atelectatic change present at the le
ft lung base. No evident pneumothorax or pleural effusion. Patient is rotated. Cardiac mediastinal si
lhouette is stable.

 

IMPRESSION:  Rotated exam. Findings compatible with right upper lobe pneumonia.

## 2018-01-08 NOTE — PN
PROGRESS NOTE



DATE OF SERVICE:

01/07/2018



ATTENDING NOTE:

Patient is seen and examined by me.  Discussed with nurse practitioner, Ms. Hamiltonkeny.

Patient doing much better.  Eating better.  Cough is present.  Minimal sputum.



PHYSICAL EXAMINATION:

Afebrile, pulse 89, respiration 20, blood pressure 112/59.

LUNGS:  Right-sided bronchial breathing posteriorly.

PSYCH AO x3.



White count 30.1, potassium 3.2. BUN 46, creatinine 1.30.



ASSESSMENT:

Sepsis due to pneumonia, suspect gram-negative organism, improving.  Patient's blood

cultures are positive for gram-negative bacilli.  Hence, patient is clinically

responding.  Will continue the patient on IV Levaquin and Zosyn.  Will follow.





MMODL / IJN: [id number] / Job#: [id number]

## 2018-01-08 NOTE — P.PN
Subjective


Progress Note Date: 01/08/18


Principal diagnosis: 





acute sepsis from acute right upper lobe pneumonia and acute to gram-negative 

bacteremia.





this is a 72-year-old female patient with known history of advanced oxygen-

dependent COPD, biopsy-proven bronchiolitis obliterans with organizing 

pneumonia and morbid obesity was gained significant amount of weight as the 

patient got treated with systemic steroids for BOOP.  The patient has had 

multiple hospitalization for a story complications.  The patient has had been 

evaluated by my partner Dr. Chino.  Following her most hospitalization the 

[place] [facility] [place] and following 

that she was discharged home.  After staying home for around a week the patient 

started having increased cough congestion and wheezing and she was having 

increased shortness of breath.  For that reason she came back to the hospital 

and she was found to have a right upper lobe pneumonia which is up a new onset.

  She was started on examination Zosyn and Levaquin and a pulmonary 

consultation was requested.  The patient has had a chest x-ray at time of 

admission and subsequent chest x-ray from this morning on 01/06/2018 showed 

right upper lobe pneumonia.  She was also started on DuoNeb nebulized 

treatments around the clock.  She has been on 20 mg of prednisone at a time of 

her discharge and this has been as part of a burst taper.  Her current 

prednisone dose is at 10 mg by mouth 3 times a day.





On 01/07/2018 the patient is being seen for a follow-up.  As mentioned earlier 

the patient has an extensive right upper lobe pneumonia in addition to an 

underlying COPD and bronchiolitis obliterans with organizing pneumonia.  The 

patient's blood culture was positive for gram-negative bacillus.  The patient 

is currently on a combination of Zosyn and Levaquin.  This is most likely a gram

-negative bacteria and the final microbiology still pending for now.  No 

pleurisy.  No hemoptysis.  No chest pain.  Clinically the patient is feeling 

better.  She has regained some of her strength back.  She was able to sit up on 

a chair.  She is tolerating her diet.  No nausea or vomiting.  No altered 

mentation.  She is currently on a prednisone which was At a dose of 30 mg on a 

daily basis.





On 1/8/2018, patient is feeling a bit better, receiving treatment for what 

seems to be advanced right upper lobe consolidation, with positive blood 

cultures for gram-negative bacillus.  Patient is now on Zosyn and 

Levaquin.continues to have no major change on the chest x-ray today as far as 

her right upper lobe consolidation is concerned.  Clinically however the 

patient is feeling a bit better.blood cultures are positive for Citrobacter 

amalonaticus sensitive to Zosyn and also sensitive to Levaquin.





Objective





- Vital Signs


Vital signs: 


 Vital Signs











Temp  96.9 F L  01/08/18 11:36


 


Pulse  74   01/08/18 11:36


 


Resp  20   01/08/18 11:36


 


BP  135/69   01/08/18 11:36


 


Pulse Ox  97   01/08/18 11:36








 Intake & Output











 01/07/18 01/08/18 01/08/18





 18:59 06:59 18:59


 


Intake Total 2646.993 9463 555


 


Output Total  300 


 


Balance 2733.969 6910 555


 


Weight  133.4 kg 


 


Intake:   


 


   650 50


 


    Piperacillin-Tazobactam 3 50 50 50





    .375 gm In Dextrose/Water   





    1 50ml.bag @ 12.5 mls/hr   





    IVPB Q8H NED Rx#:   





    [id number]   


 


    Sodium Chloride 0.9% 1, 600 600 





    000 ml @ 75 mls/hr IV .   





    [id number] NED Rx#:[id number]   


 


  Intake, IV Titration 37.100 150 325





  Amount   


 


    Insulin Regular 100 unit 37.100  





    In Sodium Chloride 0.9%   





    100 ml @ Titrate IV .Q0M   





    NED Rx#:[id number]   


 


    Levofloxacin 750Mg-D5w  150 





    Pmx 750 mg In Dextrose/   





    Water 1 150ml.bag @ 100   





    mls/hr IVPB Q48H NED Rx#:   





    [id number]   


 


    Sodium Chloride 0.9% 1,   325





    000 ml @ 75 mls/hr IV .   





    [id number] NED Rx#:[id number]   


 


  Oral 1080 600 180


 


Output:   


 


  Urine  300 


 


Other:   


 


  Voiding Method Bedpan Bedside Commode Bedside Commode





 Diaper  





 Incontinent  


 


  # Voids 1 2 


 


  # Bowel Movements 1  














- Exam


GENERAL EXAM:  obese.  Alert, active, comfortable in no apparent distress.


HEAD: Normocephalic.


EYES: Normal reaction of pupils, equal size.


NOSE: Clear with pink turbinates.


THROAT: No erythema or exudates.


NECK: No masses, no JVD.


CHEST: No chest wall deformity.


LUNGS: Equal air entry with no crackles, wheeze, rhonchi or dullness.  The 

patient is prolongation of expiratory phase of breathing and diffuse expiratory 

wheezes heard throughout the lung fields bilaterally.


CVS: S1 and S2 normal with no audible murmur, regular rhythm.


ABDOMEN: No hepatosplenomegaly, normal bowel sounds, no guarding or rigidity.


SPINE: No scoliosis or deformity


SKIN: No rashes


CENTRAL NERVOUS SYSTEM: No focal deficits, tone is normal in all 4 extremities.


EXTREMITIES: There is changes of chronic peripheral edema.  No clubbing, no 

cyanosis.  Peripheral pulses are intact.











- Labs


CBC & Chem 7: 


 01/08/18 06:06





 01/08/18 06:06


Labs: 


 Abnormal Lab Results - Last 24 Hours (Table)











  01/07/18 01/07/18 01/07/18 Range/Units





  13:56 16:33 20:58 


 


WBC     (3.8-10.6)  k/uL


 


RBC     (3.80-5.40)  m/uL


 


Hgb     (11.4-16.0)  gm/dL


 


Hct     (34.0-46.0)  %


 


MCHC     (31.0-37.0)  g/dL


 


RDW     (11.5-15.5)  %


 


Neutrophils # (Manual)     (1.3-7.7)  k/uL


 


Nucleated RBCs     (0-0)  /100 WBC


 


Potassium  3.2 L    (3.5-5.1)  mmol/L


 


Chloride     ()  mmol/L


 


Carbon Dioxide     (22-30)  mmol/L


 


BUN     (7-17)  mg/dL


 


Creatinine     (0.52-1.04)  mg/dL


 


Glucose     (74-99)  mg/dL


 


POC Glucose (mg/dL)   259 H  348 H  (75-99)  mg/dL


 


Calcium     (8.4-10.2)  mg/dL














  01/08/18 01/08/18 01/08/18 Range/Units





  06:06 06:06 06:11 


 


WBC  20.5 H    (3.8-10.6)  k/uL


 


RBC  3.19 L    (3.80-5.40)  m/uL


 


Hgb  8.8 L    (11.4-16.0)  gm/dL


 


Hct  29.6 L    (34.0-46.0)  %


 


MCHC  29.6 L    (31.0-37.0)  g/dL


 


RDW  22.5 H    (11.5-15.5)  %


 


Neutrophils # (Manual)  18.86 H    (1.3-7.7)  k/uL


 


Nucleated RBCs  1 H    (0-0)  /100 WBC


 


Potassium     (3.5-5.1)  mmol/L


 


Chloride   97 L   ()  mmol/L


 


Carbon Dioxide   33 H   (22-30)  mmol/L


 


BUN   43 H   (7-17)  mg/dL


 


Creatinine   1.14 H   (0.52-1.04)  mg/dL


 


Glucose   257 H   (74-99)  mg/dL


 


POC Glucose (mg/dL)    299 H  (75-99)  mg/dL


 


Calcium   7.7 L   (8.4-10.2)  mg/dL














  01/08/18 Range/Units





  11:28 


 


WBC   (3.8-10.6)  k/uL


 


RBC   (3.80-5.40)  m/uL


 


Hgb   (11.4-16.0)  gm/dL


 


Hct   (34.0-46.0)  %


 


MCHC   (31.0-37.0)  g/dL


 


RDW   (11.5-15.5)  %


 


Neutrophils # (Manual)   (1.3-7.7)  k/uL


 


Nucleated RBCs   (0-0)  /100 WBC


 


Potassium   (3.5-5.1)  mmol/L


 


Chloride   ()  mmol/L


 


Carbon Dioxide   (22-30)  mmol/L


 


BUN   (7-17)  mg/dL


 


Creatinine   (0.52-1.04)  mg/dL


 


Glucose   (74-99)  mg/dL


 


POC Glucose (mg/dL)  256 H  (75-99)  mg/dL


 


Calcium   (8.4-10.2)  mg/dL








 Microbiology - Last 24 Hours (Table)











 01/05/18 18:21 Blood Culture Gram Stain - Final





 Blood Blood Culture - Final





    Citrobacter amalonaticus


 


 01/05/18 16:06 Blood Culture Gram Stain - Final





 Blood Blood Culture - Final





    Citrobacter amalonaticus














Assessment and Plan


Assessment: 





1 acute bacterial right upper lobe pneumonia, Citrobacter species is growing in 

her blood, and it is positive sensitivity for Zosyn and for Levaquin.





2 acute sepsis with leukocytosis, white cell count remains elevated at 30.





3 acute kidney injury secondary to above





4 morbid obesity





5 COPD exacerbation secondary to above.  The patient also has chronic hypoxic 

respiratory failure.





6 acute lactic acidosis secondary to above, improving





7 bronchiolitis obliterans with organizing pneumonia maintained on systemic 

steroids





8 chronic steroid use with ongoing weight gain





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





10 restless leg syndrome





11 chronic lower extremity edema





12 chronic urinary incontinence





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





14 chronic diverticulosis





15 previous history of lower extremity cellulitis





16 possible cor pulmonale and obesity hypoventilation syndrome secondary to 

morbid obese body habitus





17 previous history of GI bleeding





Recommendation: Continue present antibiotics, continue bronchodilators, 

continue present treatment plan, will follow closely.








Time with Patient: Less than 30

## 2018-01-08 NOTE — PN
PROGRESS NOTE



DATE OF SERVICE:

01/07/2018.



REASON FOR FOLLOWUP:

Pneumonia and bacteremia.



INTERVAL HISTORY:

The patient is afebrile.  She is breathing comfortably today.  She did have a cough but

is getting less productive.  The patient denies having any chest pain.  No abdominal

pain.  No nausea, vomiting, or any diarrhea.



EXAMINATION:

Blood pressure 136/55 with a pulse of 82, temperature of 98.1 she is 96% on 4 L nasal

cannula. General description is an elderly female up in the bed, in no distress.

Respiratory system:  Unlabored breathing with decreased intensity of breath sounds.  No

wheeze.  Heart S1, S2.  Regular rate and rhythm. Abdomen  soft and no tenderness.



LABS:

White count is down to 30,000.  Blood culture with gram negative with ID sensitivities

pending.



DIAGNOSTIC IMPRESSION AND PLAN:

Patient admitted to the hospital with sepsis with gram-negative bacteremia, source is

likely pneumonia.  We are waiting for the final ID of this pathogen to adjust

antibiotic further.  Keep the patient on Zosyn and Levaquin at this point. Continue

supportive care.





MMODL / IJN: [id number] / Job#: [id number]

## 2018-01-08 NOTE — P.PN
Progress Note - Text


Progress Note Date: 01/08/18


DATE OF SERVICE: 


01/08/2018





PRESENTING COMPLAINT:


Delirium





HISTORY OF PRESENT ILLNESS:


72-year-old female who was discharged recently went to [facility], and was 

discharged from there on December 30 back to her home.  Had continued 

outpatient rehab after being discharged from [facility] over the last 24 hours 

patient became increasingly tired somewhat more delirious, continues to have 

lower extremity cellulitis which has greatly improved however still present, a 

cough developed with chills and was brought in for further evaluation found to 

be septic.  Admitted for the same.





INTERVAL HISTORY:


01/08/2018:


Patient sitting up in a chair states she feels great,breathing is improved 

cough still presentin production some wheezing, no delirium, able to get from 

the bed to the bedside commode and back without assistance.tolerating her diet 

eating between 40 and 50% of her diet although she did not eat much for 

breakfast.  Lower extremities remain moderately reddened but are also 

improving.  Moved her bowels.





01/07/2018:


Patient sitting up in a chair states she feels much better afebrile, has a cough

, no sputum production and some wheezing, she is awake alert able to answer 

questions.  Ate about 35-50% of her diet for breakfast states her appetite is 

improving.  Up with assistance.  Lower extremities remain moderately reddened 

but also are improving.  Moved her bowels.





01/06/2018:


Patient lying in bed appears somewhat comfortable able to answer simple 

straightforward questions, ate a small amount of her breakfast between 10 and 20

%, is able to get up with assistance and walker, cough is present no sputum 

production.  Currently afebrile, last BM prior to admission.





REVIEW OF SYSTEMS:


Done for constitutional ,cardiovascular, GI, pulmonary with relevant findings 

as above.





CURRENT MEDICATIONS


Acetaminophen, DuoNeb, Symbicort, sliding scale, latanoprost one drop both eyes 

at bedtime, levofloxacin 750 mg IV piggyback, melatonin 5 mg by mouth at bedtime

, Zosyn 3.375 g in IVP back, prednisone, Xarelto, Requip.





PHYSICAL EXAM


VITAL SIGNS: 


temperature 97.2, pulse 79, respiratory rate 20, blood pressure 119/58, oxygen 

saturation 99% on 4 L.





GENERAL APPEARANCE: Sitting up in chair not in distress. 


EYES: Pupils equal. Conjunctiva normal. 


NECK: JVD not raised. Mass not palpable. 


RESPIRATORY: Respiratory effort increased. Lungs diminished to auscultation. 


CARDIOVASCULAR: First and second sounds normal.  Mild  edema. 


ABDOMEN: Soft. Liver and spleen not palpable. No tenderness. No mass palpable. 


PSYCHIATRY: Able to answer simple straightforward questions no confusion today.

  Mood and affect normal. 


INTEGUMENT: Mild edema to both lower extremities redness present below the 

knees bilaterally.





INVESTIGATIONS:


LABS: White blood cell count 30.1, hemoglobin 8.8, potassium 3.2, BUN 46, 

creatinine 1.30, Accu-Cheks noted.


Blood cultures: CIRTOBACTER AMALONATICUS


chest x-ray: Right upper lobe pneumonia





ASSESSMENT:


-Acute sepsis, likely due to Right upper lobe pneumonia causing acute delirium, 

improving


-Acute delirium/metabolic encephalopathy likely due to acute sepsis,improving


-Acute bacterial right upper lobe pneumonia, with cultures positive for 

cirtobacter amalonaticus, causing sepsis, improving


-Acute Leukocytosis secondary to acute right upper lobe pneumonia


-Acute kidney injury secondary to sepsis/pneumonia


-Chronic sigmoid diverticulosis.


-Chronic hypoxic respiratory failure on 3 L of oxygen at home from underlying 

chronic obstructive pulmonary disease.


-Chronic obstructive pulmonary disease in an ex-smoker.


-Obesity hypoventilation syndrome.


-Possible cor pulmonale from obesity hypoventilation syndrome and chronic 

obstructive pulmonary disease and chronic thromboembolic lung disease.


-Diabetes mellitus type 2 chronically on insulin.


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 

Xarelto.


-Chronic low back pain from spinal stenosis.


-Chronic bilateral lower extremity lymphedema.


-Chronic urinary stress incontinence.


-Chronic gait dysfunction uses a walker.


-Chronic restless leg syndrome.


-Chronic history of splenectomy.


-Chronic diverticulosis.


-Chronic bilateral lower extremity cellulitis probably with acute component.





PLAN:


BLOOD CULTURES POSITIVE FOR cITROBACTER amalonaticus and is sensitive to both 

Zosyn and Levaquin we'll await additional input from infectious disease. 

Continue bronchodilators and prednisone with daily chest x-rays.  Plan of care 

discussed at the bedside with the  and patient they are in agreement.  

We will follow closely.





NP statement: Patient was seen and examined by nurse practitioner Priscilla Martinez and all elements of the case discussed with attending  Dr. Villa

## 2018-01-08 NOTE — PN
PROGRESS NOTE



DATE OF SERVICE:

01/08/2018 .



ATTENDING NOTE:

Patient was seen and examined by me.  I discussed with nurse practitioner, Ms. Hamiltonkeny.



Patient admitted with acute delirium, pneumonia, sepsis.  Blood cultures have come back

positive for citrobacter species.  Sputum production is greatly improved.  Diarrhea is

slowly improving.  Less cough.



PHYSICAL EXAMINATION:

Afebrile. Pulse 74, respiration 18, blood pressure 130/69, pulse ox 97% on 4 L.

LUNGS:  Right-sided bronchial breathing with some coarse breath sounds on the right

side posteriorly.

PSYCH: Alert and oriented x3.

Appetite is slowly getter better.



INVESTIGATIONS:

White count 20.5, hemoglobin 8.8, BUN 43, creatinine 1.14.  Blood cultures positive for

Citrobacter amalonaticus.



ASSESSMENT:

1. Acute bacterial right upper lobe pneumonia with blood cultures positive for

    Citrobacter amalonaticus causing sepsis on presentation with clinical improvement.

2. Acute kidney injury, probably due to acute tubular necrosis, improving, with

    creatinine down from 1.5 to 1.14.



PLAN:

Continue patient on IV Zosyn, nebulized bronchodilators.  Will add insulin Aspart 5

units with each meal.  Antibiotics are to continue per Dr. Roach.  Will follow.





MMODL / IJN: [id number] / Job#: [id number]

## 2018-01-09 LAB
ANION GAP SERPL CALC-SCNC: 10 MMOL/L
BUN SERPL-SCNC: 42 MG/DL (ref 7–17)
CALCIUM SPEC-MCNC: 8.2 MG/DL (ref 8.4–10.2)
CELLS COUNTED: 200
CHLORIDE SERPL-SCNC: 99 MMOL/L (ref 98–107)
CO2 SERPL-SCNC: 29 MMOL/L (ref 22–30)
ERYTHROCYTE [DISTWIDTH] IN BLOOD BY AUTOMATED COUNT: 3.3 M/UL (ref 3.8–5.4)
ERYTHROCYTE [DISTWIDTH] IN BLOOD: 22.7 % (ref 11.5–15.5)
GLUCOSE BLD-MCNC: 163 MG/DL (ref 75–99)
GLUCOSE BLD-MCNC: 194 MG/DL (ref 75–99)
GLUCOSE BLD-MCNC: 214 MG/DL (ref 75–99)
GLUCOSE BLD-MCNC: 256 MG/DL (ref 75–99)
GLUCOSE SERPL-MCNC: 310 MG/DL (ref 74–99)
HCT VFR BLD AUTO: 30.7 % (ref 34–46)
HGB BLD-MCNC: 9.1 GM/DL (ref 11.4–16)
LYMPHOCYTES # BLD MANUAL: 1.5 K/UL (ref 1–4.8)
MCH RBC QN AUTO: 27.5 PG (ref 25–35)
MCHC RBC AUTO-ENTMCNC: 29.5 G/DL (ref 31–37)
MCV RBC AUTO: 93 FL (ref 80–100)
MONOCYTES # BLD MANUAL: 0.86 K/UL (ref 0–1)
NEUTROPHILS NFR BLD MANUAL: 90 %
NEUTS SEG # BLD MANUAL: 19.4 K/UL (ref 1.3–7.7)
PLATELET # BLD AUTO: 287 K/UL (ref 150–450)
POTASSIUM SERPL-SCNC: 3.7 MMOL/L (ref 3.5–5.1)
SODIUM SERPL-SCNC: 138 MMOL/L (ref 137–145)
WBC # BLD AUTO: 21.4 K/UL (ref 3.8–10.6)

## 2018-01-09 RX ADMIN — DEXTROSE MONOHYDRATE SCH MLS/HR: 5 INJECTION, SOLUTION INTRAVENOUS at 05:56

## 2018-01-09 RX ADMIN — INSULIN ASPART SCH UNIT: 100 INJECTION, SOLUTION INTRAVENOUS; SUBCUTANEOUS at 13:33

## 2018-01-09 RX ADMIN — INSULIN DETEMIR SCH UNIT: 100 INJECTION, SOLUTION SUBCUTANEOUS at 08:54

## 2018-01-09 RX ADMIN — CEFAZOLIN SCH MLS/HR: 330 INJECTION, POWDER, FOR SOLUTION INTRAMUSCULAR; INTRAVENOUS at 18:14

## 2018-01-09 RX ADMIN — INSULIN ASPART SCH UNIT: 100 INJECTION, SOLUTION INTRAVENOUS; SUBCUTANEOUS at 08:26

## 2018-01-09 RX ADMIN — BUDESONIDE AND FORMOTEROL FUMARATE DIHYDRATE SCH PUFF: 160; 4.5 AEROSOL RESPIRATORY (INHALATION) at 19:10

## 2018-01-09 RX ADMIN — INSULIN ASPART SCH UNIT: 100 INJECTION, SOLUTION INTRAVENOUS; SUBCUTANEOUS at 21:50

## 2018-01-09 RX ADMIN — INSULIN ASPART SCH UNIT: 100 INJECTION, SOLUTION INTRAVENOUS; SUBCUTANEOUS at 18:13

## 2018-01-09 RX ADMIN — BUDESONIDE AND FORMOTEROL FUMARATE DIHYDRATE SCH PUFF: 160; 4.5 AEROSOL RESPIRATORY (INHALATION) at 07:20

## 2018-01-09 RX ADMIN — LATANOPROST SCH DROPS: 50 SOLUTION OPHTHALMIC at 21:48

## 2018-01-09 RX ADMIN — Medication SCH MG: at 21:48

## 2018-01-09 RX ADMIN — CEFAZOLIN SCH MLS/HR: 330 INJECTION, POWDER, FOR SOLUTION INTRAMUSCULAR; INTRAVENOUS at 06:00

## 2018-01-09 RX ADMIN — RIVAROXABAN SCH MG: 10 TABLET, FILM COATED ORAL at 08:27

## 2018-01-09 RX ADMIN — DEXTROSE MONOHYDRATE SCH MLS/HR: 5 INJECTION, SOLUTION INTRAVENOUS at 14:10

## 2018-01-09 RX ADMIN — INSULIN ASPART SCH UNIT: 100 INJECTION, SOLUTION INTRAVENOUS; SUBCUTANEOUS at 18:14

## 2018-01-09 RX ADMIN — DEXTROSE MONOHYDRATE SCH MLS/HR: 5 INJECTION, SOLUTION INTRAVENOUS at 20:27

## 2018-01-09 RX ADMIN — IPRATROPIUM BROMIDE AND ALBUTEROL SULFATE PRN ML: .5; 3 SOLUTION RESPIRATORY (INHALATION) at 07:20

## 2018-01-09 NOTE — PN
PROGRESS NOTE



DATE OF SERVICE:

01/08/2018



REASON FOR FOLLOWUP:

Gram-negative pneumonia and bacteremia.



INTERVAL HISTORY:

The patient is afebrile.  She is breathing more comfortably.  She continued to 
have a

cough, but decreased in intensity, mostly dry in nature.  No chest pain.  No 
abdominal

pain.  No nausea or  vomiting.  No diarrhea.



PHYSICAL EXAMINATION:

On examination, blood pressure is 116/59 with a pulse of 92, temperature of 97. 
She is

98% on 4 L nasal cannula.

General description is an elderly female up in the bed, in no distress.

RESPIRATORY SYSTEM:  Unlabored breathing with decreased breath sounds at the 
bases. No

wheeze.

HEART: S1, S2.  Regular rate and rhythm.

ABDOMEN:  Soft. No tenderness.



LABS:

Hemoglobin 8.8, white count 20.5. BUN of 43, creatinine 1.14.  The blood 
culture with

Citrobacter.



DIAGNOSTIC IMPRESSION AND PLAN:

Patient with Citrobacter bacteremia source is pneumonia.  The patient seemed to 
be

slowly clinically improving. She will continue with Levaquin and Zosyn. 
Requesting a

PICC line for outpatient IV antibiotic therapy for her  pneumonia and 
bacteremia.

Continue supportive care.





MMODL / IJN: [id number] / Job#: [id number]

FAZAL

## 2018-01-09 NOTE — P.PN
Subjective


Progress Note Date: 01/09/18


Principal diagnosis: 


Acute bacterial right upper lobe pneumonia, blood cultures positive for 

Citrobacter species with sensitivity to Zosyn and Levaquin





this is a 72-year-old female patient with known history of advanced oxygen-

dependent COPD, biopsy-proven bronchiolitis obliterans with organizing 

pneumonia and morbid obesity was gained significant amount of weight as the 

patient got treated with systemic steroids for BOOP.  The patient has had 

multiple hospitalization for a story complications.  The patient has had been 

evaluated by my partner Dr. Chino.  Following her most hospitalization the 

[place] [facility] [place] and following 

that she was discharged home.  After staying home for around a week the patient 

started having increased cough congestion and wheezing and she was having 

increased shortness of breath.  For that reason she came back to the hospital 

and she was found to have a right upper lobe pneumonia which is up a new onset.

  She was started on examination Zosyn and Levaquin and a pulmonary 

consultation was requested.  The patient has had a chest x-ray at time of 

admission and subsequent chest x-ray from this morning on 01/06/2018 showed 

right upper lobe pneumonia.  She was also started on DuoNeb nebulized 

treatments around the clock.  She has been on 20 mg of prednisone at a time of 

her discharge and this has been as part of a burst taper.  Her current 

prednisone dose is at 10 mg by mouth 3 times a day.





On 01/07/2018 the patient is being seen for a follow-up.  As mentioned earlier 

the patient has an extensive right upper lobe pneumonia in addition to an 

underlying COPD and bronchiolitis obliterans with organizing pneumonia.  The 

patient's blood culture was positive for gram-negative bacillus.  The patient 

is currently on a combination of Zosyn and Levaquin.  This is most likely a gram

-negative bacteria and the final microbiology still pending for now.  No 

pleurisy.  No hemoptysis.  No chest pain.  Clinically the patient is feeling 

better.  She has regained some of her strength back.  She was able to sit up on 

a chair.  She is tolerating her diet.  No nausea or vomiting.  No altered 

mentation.  She is currently on a prednisone which was At a dose of 30 mg on a 

daily basis.





On 1/8/2018, patient is feeling a bit better, receiving treatment for what 

seems to be advanced right upper lobe consolidation, with positive blood 

cultures for gram-negative bacillus.  Patient is now on Zosyn and 

Levaquin.continues to have no major change on the chest x-ray today as far as 

her right upper lobe consolidation is concerned.  Clinically however the 

patient is feeling a bit better.blood cultures are positive for Citrobacter 

amalonaticus sensitive to Zosyn and also sensitive to Levaquin.





On 01/09/2018 patient seen in follow-up on medical surgical floor.  Doing well, 

improving, still gets very dyspneic with exertion, even going to the bedside 

commode, does require periods of rest to recover.  Lung sounds are generally 

diminished, with scattered expiratory wheezes.  But overall she is making slow 

improvement.  FiO2 requirement is down to 3 L per cannula.  Microbiology 

results have been reviewed, sputum culture positive for presumptive staph aureus

, Candida albicans, blood cultures were positive for Citrobacter species, with 

sensitivity to both Zosyn and Levaquin.  Patient has been afebrile, 

hemodynamically stable, vital signs remained stable. 








Objective





- Vital Signs


Vital signs: 


 Vital Signs











Temp  96.5 F L  01/09/18 07:00


 


Pulse  77   01/09/18 07:30


 


Resp  16   01/09/18 07:30


 


BP  126/67   01/09/18 07:00


 


Pulse Ox  99   01/09/18 07:20








 Intake & Output











 01/08/18 01/09/18 01/09/18





 18:59 06:59 18:59


 


Intake Total 1395 500 


 


Output Total  801 


 


Balance 1395 -301 


 


Weight  133.3 kg 


 


Intake:   


 


  IV 50 500 


 


    Piperacillin-Tazobactam 3 50 50 





    .375 gm In Dextrose/Water   





    1 50ml.bag @ 12.5 mls/hr   





    IVPB Q8H NED Rx#:   





    [id number]   


 


    Sodium Chloride 0.9% 1,  450 





    000 ml @ 75 mls/hr IV .   





    [id number] NED Rx#:[id number]   


 


  Intake, IV Titration 325  





  Amount   


 


    Sodium Chloride 0.9% 1, 325  





    000 ml @ 75 mls/hr IV .   





    [id number] NED Rx#:[id number]   


 


  Oral 1020  


 


Output:   


 


  Urine  800 


 


  Stool  1 


 


Other:   


 


  Voiding Method Bedside Commode Bedside Commode Bedside Commode


 


  # Voids 1 3 


 


  # Bowel Movements  2 














- Exam


GENERAL EXAM:  obese.  Alert, active, comfortable in no apparent distress.


HEAD: Normocephalic.


EYES: Normal reaction of pupils, equal size.


NOSE: Clear with pink turbinates.


THROAT: No erythema or exudates.


NECK: No masses, no JVD.


CHEST: No chest wall deformity.


LUNGS: Equal air entry, tight wheeze heard throughout the lung fields 

bilaterally.


CVS: S1 and S2 normal with no audible murmur, regular rhythm.


ABDOMEN: No hepatosplenomegaly, normal bowel sounds, no guarding or rigidity.


SPINE: No scoliosis or deformity


SKIN: No rashes


CENTRAL NERVOUS SYSTEM: No focal deficits, tone is normal in all 4 extremities.


EXTREMITIES: There is changes of chronic peripheral edema.  No clubbing, no 

cyanosis.  Peripheral pulses are intact.











- Labs


CBC & Chem 7: 


 01/09/18 08:41





 01/09/18 08:41


Labs: 


 Abnormal Lab Results - Last 24 Hours (Table)











  01/08/18 01/08/18 01/09/18 Range/Units





  16:47 21:10 07:02 


 


WBC     (3.8-10.6)  k/uL


 


RBC     (3.80-5.40)  m/uL


 


Hgb     (11.4-16.0)  gm/dL


 


Hct     (34.0-46.0)  %


 


MCHC     (31.0-37.0)  g/dL


 


RDW     (11.5-15.5)  %


 


Neutrophils # (Manual)     (1.3-7.7)  k/uL


 


BUN     (7-17)  mg/dL


 


Creatinine     (0.52-1.04)  mg/dL


 


Glucose     (74-99)  mg/dL


 


POC Glucose (mg/dL)  269 H  322 H  256 H  (75-99)  mg/dL


 


Calcium     (8.4-10.2)  mg/dL














  01/09/18 01/09/18 01/09/18 Range/Units





  08:41 08:41 11:56 


 


WBC  21.4 H    (3.8-10.6)  k/uL


 


RBC  3.30 L    (3.80-5.40)  m/uL


 


Hgb  9.1 L    (11.4-16.0)  gm/dL


 


Hct  30.7 L    (34.0-46.0)  %


 


MCHC  29.5 L    (31.0-37.0)  g/dL


 


RDW  22.7 H    (11.5-15.5)  %


 


Neutrophils # (Manual)  19.40 H    (1.3-7.7)  k/uL


 


BUN   42 H   (7-17)  mg/dL


 


Creatinine   1.08 H   (0.52-1.04)  mg/dL


 


Glucose   310 H   (74-99)  mg/dL


 


POC Glucose (mg/dL)    163 H  (75-99)  mg/dL


 


Calcium   8.2 L   (8.4-10.2)  mg/dL








 Microbiology - Last 24 Hours (Table)











 01/08/18 11:34 Blood Culture Gram Stain - Preliminary





 Blood 


 


 01/08/18 11:34 Blood Culture - Final





 Blood 


 


 01/08/18 09:25 Gram Stain - Preliminary





 Sputum 


 


 01/05/18 18:21 Blood Culture Gram Stain - Final





 Blood Blood Culture - Final





    Citrobacter amalonaticus


 


 01/05/18 16:06 Blood Culture Gram Stain - Final





 Blood Blood Culture - Final





    Citrobacter amalonaticus














Assessment and Plan


Plan: 


Assessment: 





1 acute bacterial right upper lobe pneumonia, Citrobacter species is growing in 

her blood, and it is positive sensitivity for Zosyn and for Levaquin.





2 acute sepsis with leukocytosis, white cell count remains elevated at 30.





3 acute kidney injury secondary to above





4 morbid obesity





5 COPD exacerbation secondary to above.  The patient also has chronic hypoxic 

respiratory failure.





6 acute lactic acidosis secondary to above, improving





7 bronchiolitis obliterans with organizing pneumonia maintained on systemic 

steroids





8 chronic steroid use with ongoing weight gain





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





10 restless leg syndrome





11 chronic lower extremity edema





12 chronic urinary incontinence





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





14 chronic diverticulosis





15 previous history of lower extremity cellulitis





16 possible cor pulmonale and obesity hypoventilation syndrome secondary to 

morbid obese body habitus





17 previous history of GI bleeding





Recommendation: 





Continue present antibiotics, continue bronchodilators, continue present 

treatment plan, will follow closely.  Increase patient's activity as tolerated.

  Repeat 2 view chest x-ray in the morning.  Patient still has significant 

dyspnea with any type of exertion, requesting short-term rehab.  This will be 

discussed with the case management team. 





I performed a history & physical examination of the patient and discussed their 

management with my nurse practitioner, Corinne Garcia.  I reviewed the nurse 

practitioner's note and agree with the documented findings and plan of care.  

Lung sounds are positive for diffuse wheezes throughout the lung fields.  The 

findings and the impression was discussed with the patient.  I attest to the 

documentation by the nurse practitioner. 








Time with Patient: Less than 30





Time with Patient: Less than 30

## 2018-01-09 NOTE — PN
PROGRESS NOTE



DATE OF SERVICE:

01/09/18.



ATTENDING NOTE:

The patient was seen and examined by me.  I discussed with nurse practitioner, Ms. Martinez.  Patient admitted with pneumonia, sepsis.  Doing much better.  Sputum

production is greatly improved.  Eating much better.  Comfortable, sitting on the edge

of the bed.



PHYSICAL EXAMINATION:

Afebrile.  Pulse 92, blood pressure 108/56.  Lungs improved air entry.  Cardiovascular

first and second sounds normal.  Psych AO x3.



White count 21.4, potassium 3.7, BUN 42, creatinine 1.08.



INVESTIGATIONS:

Blood cultures have grown Citrobacter _____and sputum cultures growing Candida and

Staphylococcus aureus.



At the present time, antibiotics having been coordinated by Infectious Disease.

Vancomycin was added.  Patient also on Zosyn and Levaquin.  Will check with ID.

Clinically patient has done much better.





MMODL / IJN: [id number] / Job#: [id number]

## 2018-01-09 NOTE — PN
PROGRESS NOTE



DATE OF SERVICE:

01/09/2018



REASON FOR FOLLOWUP:

1. Gram-negative pneumonia.

2. Positive blood culture with gram-positive cocci in cluster.



INTERVAL HISTORY:

The patient is afebrile.  She is breathing comfortably but denies any significant chest

pain.  Occasional cough.  No abdominal pain and no diarrhea.



PHYSICAL EXAMINATION:

Blood pressure 126/57 with a pulse of 71, temperature 96.5.  She is 99% on 4 L nasal

cannula.

General description is an elderly female up in the bed, in no distress.

RESPIRATORY SYSTEM:  Unlabored breathing.  Some coarse breath sounds in the bases, no

wheeze.

HEART:  S1, S2.  Regular rate and rhythm.

ABDOMEN:  Soft, no tenderness.



LABS:

Hemoglobin is 9.1, white count 21.4 with a BUN of 42, creatinine 1.08.  Stool for C.

diff came back negative.  Sputum not showing presumptive Staph aureus.  Blood culture

repeat now showing a gram-positive cocci in clusters.



DIAGNOSTIC IMPRESSION AND PLAN:

Patient with gram-negative bacteremia, source of likely pneumonia, now with a sputum

showing a Staph aureus and blood cultures also showing a gram-positive cocci.

Vancomycin will be added and repeat blood culture has been requested, adjusting

antibiotic further on the basis of these repeat cultures.  Continue supportive care.





MMODL / IJN: [id number] / Job#: [id number]

## 2018-01-09 NOTE — P.PN
Progress Note - Text


Progress Note Date: 01/09/18


DATE OF SERVICE: 


01/09/2018





PRESENTING COMPLAINT:


Delirium





HISTORY OF PRESENT ILLNESS:


72-year-old female who was discharged recently went to [facility], and was 

discharged from there on December 30 back to her home.  Had continued 

outpatient rehab after being discharged from [facility] over the last 24 hours 

patient became increasingly tired somewhat more delirious, continues to have 

lower extremity cellulitis which has greatly improved however still present, a 

cough developed with chills and was brought in for further evaluation found to 

be septic.  Admitted for the same.





INTERVAL HISTORY:


01/09/2018:


Patient sitting up on the edge of the bed states she feels great, feeling her 

breathing is improved, cough is still present minor sputum production minor 

wheezing, no delirium.  Able to get to and from the bed and the bedside commode 

without any assistance.  Tolerating her diet eating between 50 and 75% of her 

meals.  Her extremities remain moderately reddened but also are improving.  

Last BM 01/09/2018 01/08/2018:


Patient sitting up in a chair states she feels great,breathing is improved 

cough still presentin production some wheezing, no delirium, able to get from 

the bed to the bedside commode and back without assistance.tolerating her diet 

eating between 40 and 50% of her diet although she did not eat much for 

breakfast.  Lower extremities remain moderately reddened but are also 

improving.  Moved her bowels.





01/07/2018:


Patient sitting up in a chair states she feels much better afebrile, has a cough

, no sputum production and some wheezing, she is awake alert able to answer 

questions.  Ate about 35-50% of her diet for breakfast states her appetite is 

improving.  Up with assistance.  Lower extremities remain moderately reddened 

but also are improving.  Moved her bowels.





01/06/2018:


Patient lying in bed appears somewhat comfortable able to answer simple 

straightforward questions, ate a small amount of her breakfast between 10 and 20

%, is able to get up with assistance and walker, cough is present no sputum 

production.  Currently afebrile, last BM prior to admission.





REVIEW OF SYSTEMS:


Done for constitutional ,cardiovascular, GI, pulmonary with relevant findings 

as above.





CURRENT MEDICATIONS


Acetaminophen, DuoNeb, Symbicort, sliding scale, latanoprost one drop both eyes 

at bedtime,  melatonin 5 mg by mouth at bedtime, Zosyn 3.375 g in IVP back, 

prednisone, Xarelto, Requip, vancomycin.





PHYSICAL EXAM


VITAL SIGNS: 


Vital signs 96.5, pulse 72, respiratory rate 18, blood pressure 126/67, oxygen 

saturation 99% on 4 L.





GENERAL APPEARANCE: Sitting up on the edge of the bed not in distress. 


EYES: Pupils equal. Conjunctiva normal. 


NECK: JVD not raised. Mass not palpable. 


RESPIRATORY: Respiratory effort increased. Lungs diminished to auscultation. 


CARDIOVASCULAR: First and second sounds normal.  Mild  edema. 


ABDOMEN: Soft. Liver and spleen not palpable. No tenderness. No mass palpable. 


PSYCHIATRY: Able to answer simple straightforward questions no confusion today.

  Mood and affect normal. 


INTEGUMENT: Mild edema to both lower extremities redness present below the 

knees bilaterally.





INVESTIGATIONS:


LABS: White blood cell count 21.4, hemoglobin 9.1, potassium 3.7, BUN 42, 

creatinine 1.08, Accu-Cheks noted.


Blood cultures: CIRTOBACTER AMALONATICUS


Sputum culture: Productive staph aureus, Candida albicans





ASSESSMENT:


-Acute sepsis, likely due to Right upper lobe pneumonia causing acute delirium, 

with blood cultures positive for Cirtobacter amalonaticus, improving


-Acute bacterial right upper lobe pneumonia, with blood cultures positive for 

cirtobacter amalonaticus, and sputum culture positive for presumptive staph 

aureus causing sepsis, improving


-Acute Leukocytosis secondary to acute right upper lobe pneumonia


-Acute kidney injury secondary to acute tubular necrosis and sepsis, improving


-Chronic sigmoid diverticulosis.


-Chronic hypoxic respiratory failure on 3 L of oxygen at home from underlying 

chronic obstructive pulmonary disease.


-Chronic obstructive pulmonary disease in an ex-smoker.


-Obesity hypoventilation syndrome.


-Possible cor pulmonale from obesity hypoventilation syndrome and chronic 

obstructive pulmonary disease and chronic thromboembolic lung disease.


-Diabetes mellitus type 2 chronically on insulin.


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 

Xarelto.


-Chronic low back pain from spinal stenosis.


-Chronic bilateral lower extremity lymphedema.


-Chronic urinary stress incontinence.


-Chronic gait dysfunction uses a walker.


-Chronic restless leg syndrome.


-Chronic history of splenectomy.


-Chronic diverticulosis.


-Chronic bilateral lower extremity cellulitis probably with acute component.





PLAN:


Antibiotics adjusted to include vancomycin and Zosyn on the basis of 

presumptive staph aureus in the sputum culture per infectious disease. Continue 

bronchodilators and prednisone with daily chest x-rays.  Discharge planning for 

the next 24-48 hours.  Plan of care discussed at the bedside with the  

and patient they are in agreement.  We will follow closely.





NP statement: Patient was seen and examined by nurse practitioner Priscilla Martinez and all elements of the case discussed with attending  Dr. Villa

## 2018-01-10 LAB
GLUCOSE BLD-MCNC: 100 MG/DL (ref 75–99)
GLUCOSE BLD-MCNC: 257 MG/DL (ref 75–99)
GLUCOSE BLD-MCNC: 315 MG/DL (ref 75–99)
GLUCOSE BLD-MCNC: 315 MG/DL (ref 75–99)

## 2018-01-10 RX ADMIN — INSULIN ASPART SCH UNIT: 100 INJECTION, SOLUTION INTRAVENOUS; SUBCUTANEOUS at 18:16

## 2018-01-10 RX ADMIN — INSULIN ASPART SCH: 100 INJECTION, SOLUTION INTRAVENOUS; SUBCUTANEOUS at 12:38

## 2018-01-10 RX ADMIN — SODIUM CHLORIDE SCH MLS/HR: 9 INJECTION, SOLUTION INTRAVENOUS at 07:56

## 2018-01-10 RX ADMIN — INSULIN DETEMIR SCH UNIT: 100 INJECTION, SOLUTION SUBCUTANEOUS at 07:58

## 2018-01-10 RX ADMIN — IPRATROPIUM BROMIDE AND ALBUTEROL SULFATE PRN ML: .5; 3 SOLUTION RESPIRATORY (INHALATION) at 07:23

## 2018-01-10 RX ADMIN — INSULIN ASPART SCH UNIT: 100 INJECTION, SOLUTION INTRAVENOUS; SUBCUTANEOUS at 07:57

## 2018-01-10 RX ADMIN — DEXTROSE MONOHYDRATE SCH MLS/HR: 5 INJECTION, SOLUTION INTRAVENOUS at 05:34

## 2018-01-10 RX ADMIN — BUDESONIDE AND FORMOTEROL FUMARATE DIHYDRATE SCH: 160; 4.5 AEROSOL RESPIRATORY (INHALATION) at 19:50

## 2018-01-10 RX ADMIN — DEXTROSE MONOHYDRATE SCH MLS/HR: 5 INJECTION, SOLUTION INTRAVENOUS at 20:04

## 2018-01-10 RX ADMIN — CEFAZOLIN SCH MLS/HR: 330 INJECTION, POWDER, FOR SOLUTION INTRAMUSCULAR; INTRAVENOUS at 07:58

## 2018-01-10 RX ADMIN — BUDESONIDE AND FORMOTEROL FUMARATE DIHYDRATE SCH PUFF: 160; 4.5 AEROSOL RESPIRATORY (INHALATION) at 07:23

## 2018-01-10 RX ADMIN — Medication SCH MG: at 22:02

## 2018-01-10 RX ADMIN — RIVAROXABAN SCH MG: 10 TABLET, FILM COATED ORAL at 07:57

## 2018-01-10 RX ADMIN — ACETAMINOPHEN PRN MG: 325 TABLET, FILM COATED ORAL at 03:28

## 2018-01-10 RX ADMIN — ACETAMINOPHEN PRN MG: 325 TABLET, FILM COATED ORAL at 23:18

## 2018-01-10 RX ADMIN — INSULIN ASPART SCH UNIT: 100 INJECTION, SOLUTION INTRAVENOUS; SUBCUTANEOUS at 22:02

## 2018-01-10 RX ADMIN — LATANOPROST SCH DROPS: 50 SOLUTION OPHTHALMIC at 22:18

## 2018-01-10 RX ADMIN — DEXTROSE MONOHYDRATE SCH MLS/HR: 5 INJECTION, SOLUTION INTRAVENOUS at 11:24

## 2018-01-10 NOTE — XR
EXAMINATION TYPE: XR chest 2V

 

DATE OF EXAM: 1/10/2018

 

COMPARISON: Prior chest x-ray 1/8/2018

 

HISTORY: Right upper lobe pneumonia

 

TECHNIQUE:  Frontal and lateral views of the chest are obtained.

 

FINDINGS:  There may be some slight improvement in the aeration in the right upper lobe. No evident p
neumothorax or pleural effusion. Cardiomediastinal silhouette is stable.

 

IMPRESSION:  Suspect some improvement in aeration. Follow-up to resolution recommended.

## 2018-01-10 NOTE — PN
PROGRESS NOTE



DATE OF SERVICE:

01/10/2018.



ATTENDING NOTE:

This patient was seen and examined by me. I discussed the case with the nurse

practitioner Ms. Martinez.



Patient is feeling better.  Sputum production is gone. _____ tolerating a diet.  Repeat

cultures are pending.



PHYSICAL EXAMINATION:

Temperature 96.9, pulse 84, respiration 18, blood pressure 149/75.

LUNGS: Improved air entry.

PSYCH: Alert and oriented x3.



The patient's blood cultures have grown Citrobacter amalonaticus and the patient's

sputum culture also grew MRSA and also one the patient's blood cultures has grown MRSA.



ASSESSMENT:

Acute sepsis due to right upper lobe pneumonia causing acute delirium, the latter of

which has improved, with blood cultures positive both for Citrobacter amalonaticus and

for methicillin-resistant Staphylococcus aeruginosa.



PLAN:

At this point the patient is on vancomycin and Zosyn.  Continue with same. Care was

discussed with the patient and IV antibiotics are to continue. Patient's blood cultures

from 1/9/2018 are pending. Need to have repeat blood cultures negative before patient

is discharged.





MMODL / IJN: [id number] / Job#: [id number]

## 2018-01-10 NOTE — P.PN
Subjective


Progress Note Date: 01/10/18


Principal diagnosis: 


Acute bacterial right upper lobe pneumonia, blood cultures positive for 

Citrobacter species with sensitivity to Zosyn and Levaquin





this is a 72-year-old female patient with known history of advanced oxygen-

dependent COPD, biopsy-proven bronchiolitis obliterans with organizing 

pneumonia and morbid obesity was gained significant amount of weight as the 

patient got treated with systemic steroids for BOOP.  The patient has had 

multiple hospitalization for a story complications.  The patient has had been 

evaluated by my partner Dr. Chino.  Following her most hospitalization the 

[place] [facility] [place] and following 

that she was discharged home.  After staying home for around a week the patient 

started having increased cough congestion and wheezing and she was having 

increased shortness of breath.  For that reason she came back to the hospital 

and she was found to have a right upper lobe pneumonia which is up a new onset.

  She was started on examination Zosyn and Levaquin and a pulmonary 

consultation was requested.  The patient has had a chest x-ray at time of 

admission and subsequent chest x-ray from this morning on 01/06/2018 showed 

right upper lobe pneumonia.  She was also started on DuoNeb nebulized 

treatments around the clock.  She has been on 20 mg of prednisone at a time of 

her discharge and this has been as part of a burst taper.  Her current 

prednisone dose is at 10 mg by mouth 3 times a day.





On 01/07/2018 the patient is being seen for a follow-up.  As mentioned earlier 

the patient has an extensive right upper lobe pneumonia in addition to an 

underlying COPD and bronchiolitis obliterans with organizing pneumonia.  The 

patient's blood culture was positive for gram-negative bacillus.  The patient 

is currently on a combination of Zosyn and Levaquin.  This is most likely a gram

-negative bacteria and the final microbiology still pending for now.  No 

pleurisy.  No hemoptysis.  No chest pain.  Clinically the patient is feeling 

better.  She has regained some of her strength back.  She was able to sit up on 

a chair.  She is tolerating her diet.  No nausea or vomiting.  No altered 

mentation.  She is currently on a prednisone which was At a dose of 30 mg on a 

daily basis.





On 1/8/2018, patient is feeling a bit better, receiving treatment for what 

seems to be advanced right upper lobe consolidation, with positive blood 

cultures for gram-negative bacillus.  Patient is now on Zosyn and 

Levaquin.continues to have no major change on the chest x-ray today as far as 

her right upper lobe consolidation is concerned.  Clinically however the 

patient is feeling a bit better.blood cultures are positive for Citrobacter 

amalonaticus sensitive to Zosyn and also sensitive to Levaquin.





On 01/09/2018 patient seen in follow-up on medical surgical floor.  Doing well, 

improving, still gets very dyspneic with exertion, even going to the bedside 

commode, does require periods of rest to recover.  Lung sounds are generally 

diminished, with scattered expiratory wheezes.  But overall she is making slow 

improvement.  FiO2 requirement is down to 3 L per cannula.  Microbiology 

results have been reviewed, sputum culture positive for presumptive staph aureus

, Candida albicans, blood cultures were positive for Citrobacter species, with 

sensitivity to both Zosyn and Levaquin.  Patient has been afebrile, 

hemodynamically stable, vital signs remained stable. 





On 01/10/2018 patient seen in follow-up on medical surgical floor.  She is 

currently resting in bed, she did set up for her breakfast in the chair, she is 

tolerating activity well, does become dyspneic and wheezy on exertion, but 

recovers with periods of rest.Vital signs are stable, patient is afebrile, 

continues on 3 L per nasal cannula with O2 sat at 98%. Blood cultures were 

positive for Citrobacter species, sputum culture was positive for MRSA and 

Candida, follow-up blood cultures from 01/08/2018 shows presumptive MRSA. 

patient is on vancomycin, Zosyn, Levaquin has been discontinued per ID service. 

follow-up chest x-ray from [place] 10 2018 shows some improvement in aeration in 

the right upper lobe.  Discharge planning is in progress for discharge to 

subacute rehab today.








Objective





- Vital Signs


Vital signs: 


 Vital Signs











Temp  97.3 F L  01/10/18 07:00


 


Pulse  68   01/10/18 07:33


 


Resp  20   01/10/18 07:00


 


BP  138/63   01/10/18 07:00


 


Pulse Ox  98   01/10/18 07:26








 Intake & Output











 01/09/18 01/10/18 01/10/18





 18:59 06:59 18:59


 


Intake Total  350 


 


Balance  350 


 


Weight  133 kg 


 


Intake:   


 


  Oral  350 


 


Other:   


 


  Voiding Method Bedside Commode Bedside Commode 


 


  # Voids 2 3 


 


  # Bowel Movements 1 1 














- Exam


GENERAL EXAM:  obese.  Alert, active, comfortable in no apparent distress.


HEAD: Normocephalic.


EYES: Normal reaction of pupils, equal size.


NOSE: Clear with pink turbinates.


THROAT: No erythema or exudates.


NECK: No masses, no JVD.


CHEST: No chest wall deformity.


LUNGS: Equal air entry, scattered wheezes heard throughout the lung fields 

bilaterally.


CVS: S1 and S2 normal with no audible murmur, regular rhythm.


ABDOMEN: No hepatosplenomegaly, normal bowel sounds, no guarding or rigidity.


SPINE: No scoliosis or deformity


SKIN: No rashes


CENTRAL NERVOUS SYSTEM: No focal deficits, tone is normal in all 4 extremities.


EXTREMITIES: There is changes of chronic peripheral edema.  No clubbing, no 

cyanosis.  Peripheral pulses are intact.











- Labs


CBC & Chem 7: 


 01/09/18 08:41





 01/09/18 08:41


Labs: 


 Abnormal Lab Results - Last 24 Hours (Table)











  01/09/18 01/09/18 01/10/18 Range/Units





  17:10 20:54 07:31 


 


POC Glucose (mg/dL)  194 H  214 H  315 H  (75-99)  mg/dL














  01/10/18 Range/Units





  11:54 


 


POC Glucose (mg/dL)  100 H  (75-99)  mg/dL








 Microbiology - Last 24 Hours (Table)











 01/08/18 09:25 Gram Stain - Final





 Sputum Sputum Culture - Final





    Methicillin resist S. aureus





    Shagufta albicans


 


 01/08/18 11:34 Blood Culture Gram Stain - Preliminary





 Blood Blood Culture - Preliminary





    Presumptive MRSA


 


 01/08/18 11:34 Blood Culture - Final





 Blood 














Assessment and Plan


Plan: 


Assessment: 





1 acute bacterial right upper lobe pneumonia, Citrobacter and presumptive MRSA 

are growing in her blood, patient is currently on vancomycin and Zosyn, 

Citrobacter species are sensitive to Zosyn





2 acute sepsis with leukocytosis, white cell count remains elevated at 21.4





3 acute kidney injury secondary to above, improved, today's creatinine is 1.08, 

with a BUN of 42





4 morbid obesity





5 COPD exacerbation secondary to above.  The patient also has chronic hypoxic 

respiratory failure.





6 acute lactic acidosis secondary to above, improving





7 bronchiolitis obliterans with organizing pneumonia maintained on systemic 

steroids





8 chronic steroid use with ongoing weight gain





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





10 restless leg syndrome





11 chronic lower extremity edema





12 chronic urinary incontinence





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





14 chronic diverticulosis





15 previous history of lower extremity cellulitis





16 possible cor pulmonale and obesity hypoventilation syndrome secondary to 

morbid obese body habitus





17 previous history of GI bleeding





Recommendation: 





chest x-ray from this morning shows improvement in aeration of the right upper 

lobe.  Clinically patient continues to improve, vital signs are stable, patient 

is afebrile, patient has been get not to the chair at the bedside, tolerating 

activity well.  Follow-up blood culture from 01/08/2018 was positive for 

presumptive MRSA, Levaquin was stopped, vancomycin was added, patient continues 

on IV Zosyn. 





I performed a history & physical examination of the patient and discussed their 

management with my nurse practitioner, Corinne Garcia.  I reviewed the nurse 

practitioner's note and agree with the documented findings and plan of care.  

Lung sounds are positive for diffuse wheezes throughout the lung fields.  The 

findings and the impression was discussed with the patient.  I attest to the 

documentation by the nurse practitioner. 








Time with Patient: Less than 30





Time with Patient: Less than 30

## 2018-01-10 NOTE — PN
PROGRESS NOTE



DATE OF SERVICE:

01/10/2018.



REASON FOR FOLLOWUP:

MRSA bacteremia pneumonia.



INTERVAL HISTORY:

The patient is afebrile.  She is breathing slightly comfortably at rest.  Still has

shortness of breath with minimal exertion.  She continued to have some cough but less

productive now.  No chest pain.  No abdominal pain.  No diarrhea.



EXAMINATION:

Blood pressure 149/75 with a pulse of 84, temperature of 96.9.  She is 94% on 3 L nasal

cannula.  General description is an elderly female up in the chair in no distress.

Respiratory system unlabored breathing.  Coarse breath from the right side.  No wheeze.

Heart S1, S2.  Regular rate and rhythm.  Abdomen soft, no tenderness.



LABS:

Hemoglobin is 9.1, white count 21.4, BUN of 42, creatinine 1.08.  Blood culture

repeated 1/9 so far negative.



DIAGNOSTIC IMPRESSION AND PLAN:

Patient with MRSA pneumonia bacteremia with a previous culture positive for

Citrobacter, currently on Vanco and Zosyn will be continued.  Will wait for the blood

culture done on January 9 to be negative before placing a PICC line for outpatient IV

vancomycin therapy and Zosyn will be switched to p.o. Cipro.  Continue supportive care.





MMODL / IJN: [id number] / Job#: [id number]

## 2018-01-10 NOTE — P.PN
Progress Note - Text


Progress Note Date: 01/10/18


DATE OF SERVICE: 


01/10/2018





PRESENTING COMPLAINT:


Delirium





HISTORY OF PRESENT ILLNESS:


72-year-old female who was discharged recently went to [facility], and was 

discharged from there on December 30 back to her home.  Had continued 

outpatient rehab after being discharged from [facility] over the last 24 hours 

patient became increasingly tired somewhat more delirious, continues to have 

lower extremity cellulitis which has greatly improved however still present, a 

cough developed with chills and was brought in for further evaluation found to 

be septic.  Admitted for the same.





INTERVAL HISTORY:


01/10/2018:


Lying in bed states she feels cold refused work with physical therapy this 

morning.  Cough still present minimal sputum production although wheezing noted 

no delirium. repeat blood cultures returned presumptive MRSA. Able to transfer 

to and from the bedside commode in the bed without assistance.  Tolerating her 

diet eating between 50 and 75% of her meals.  Lower extremities continue to be 

moderately reddened but are improving.  Last BM 01/09/2018.





01/09/2018:


Patient sitting up on the edge of the bed states she feels great, feeling her 

breathing is improved, cough is still present minor sputum production minor 

wheezing, no delirium.  Able to get to and from the bed and the bedside commode 

without any assistance.  Tolerating her diet eating between 50 and 75% of her 

meals.  Her extremities remain moderately reddened but also are improving.  

Last BM 01/09/2018 01/08/2018:


Patient sitting up in a chair states she feels great,breathing is improved 

cough still presentin production some wheezing, no delirium, able to get from 

the bed to the bedside commode and back without assistance.tolerating her diet 

eating between 40 and 50% of her diet although she did not eat much for 

breakfast.  Lower extremities remain moderately reddened but are also 

improving.  Moved her bowels.





01/07/2018:


Patient sitting up in a chair states she feels much better afebrile, has a cough

, no sputum production and some wheezing, she is awake alert able to answer 

questions.  Ate about 35-50% of her diet for breakfast states her appetite is 

improving.  Up with assistance.  Lower extremities remain moderately reddened 

but also are improving.  Moved her bowels.





01/06/2018:


Patient lying in bed appears somewhat comfortable able to answer simple 

straightforward questions, ate a small amount of her breakfast between 10 and 20

%, is able to get up with assistance and walker, cough is present no sputum 

production.  Currently afebrile, last BM prior to admission.





REVIEW OF SYSTEMS:


Done for constitutional ,cardiovascular, GI, pulmonary with relevant findings 

as above.





CURRENT MEDICATIONS


Acetaminophen, DuoNeb, Symbicort, sliding scale, latanoprost one drop both eyes 

at bedtime,  melatonin 5 mg by mouth at bedtime, Zosyn 3.375 g in IVP back, 

prednisone, Xarelto, Requip, vancomycin.





PHYSICAL EXAM


VITAL SIGNS: 


temperature is 97.3, pulse 72, respiratory rate 20, blood pressure 138/63, 

oxygen saturation 93% on 3 L.





GENERAL APPEARANCE: Sitting up In a chair not in distress. 


HEENT: Pupils equal. Conjunctiva normal. JVD not raised. Mass not palpable.


RESPIRATORY: Respiratory effort increased. Lungs diminished to auscultation. 


CARDIOVASCULAR: First and second sounds normal.  Mild  edema. 


ABDOMEN: Soft. Liver and spleen not palpable. No tenderness. No mass palpable. 


PSYCHIATRY: Able to answer simple straightforward questions no confusion today.

  Mood and affect normal. 


INTEGUMENT: Mild edema to both lower extremities redness present below the 

knees bilaterally.





INVESTIGATIONS:


LABS: Accu-Cheks noted.


Blood cultures: CIRTOBACTER AMALONATICUS


repeat blood cultures: Presumptive MRSA


Sputum culture: methicillin resistant staph aureus, Candida albicans





ASSESSMENT:


-Acute sepsis, likely due to Right upper lobe pneumonia causing acute delirium, 

with blood cultures positive for Cirtobacter amalonaticus,repeat blood cultures 

positive for presumptive MRSA improving


-Acute bacterial right upper lobe pneumonia, with blood cultures positive for 

cirtobacter amalonaticus, repeat blood cultures with presumptive MRSA and 

sputum culture positive for presumptive staph aureus causing sepsis, improving


-Acute Leukocytosis secondary to acute right upper lobe pneumonia


-Acute kidney injury secondary to acute tubular necrosis and sepsis, improving


-Chronic sigmoid diverticulosis.


-Chronic hypoxic respiratory failure on 3 L of oxygen at home from underlying 

chronic obstructive pulmonary disease.


-Chronic obstructive pulmonary disease in an ex-smoker.


-Obesity hypoventilation syndrome.


-Possible cor pulmonale from obesity hypoventilation syndrome and chronic 

obstructive pulmonary disease and chronic thromboembolic lung disease.


-Diabetes mellitus type 2 chronically on insulin.


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 

Xarelto.


-Chronic low back pain from spinal stenosis.


-Chronic bilateral lower extremity lymphedema.


-Chronic urinary stress incontinence.


-Chronic gait dysfunction uses a walker.


-Chronic restless leg syndrome.


-Chronic history of splenectomy.


-Chronic diverticulosis.


-Chronic bilateral lower extremity cellulitis probably with acute component.





PLAN:


Antibiotics adjusted to include vancomycin and Zosyn on the basis of 

presumptive staph aureus in the sputum culture per infectious disease.repeat 

blood cultures positive presumptive MRSA. Continue bronchodilators and 

prednisone with daily chest x-rays.  Discharge planning for the next 24-48 

hours.  Plan of care discussed at the bedside with patient she is in agreement.

  We will follow closely.





NP statement: Patient was seen and examined by nurse practitioner Priscilla Martinez and all elements of the case discussed with attending  Dr. Villa

## 2018-01-11 VITALS — RESPIRATION RATE: 18 BRPM

## 2018-01-11 LAB
ANION GAP SERPL CALC-SCNC: 10 MMOL/L
BASOPHILS # BLD AUTO: 0.1 K/UL (ref 0–0.2)
BASOPHILS NFR BLD AUTO: 0 %
BUN SERPL-SCNC: 36 MG/DL (ref 7–17)
CALCIUM SPEC-MCNC: 8.3 MG/DL (ref 8.4–10.2)
CHLORIDE SERPL-SCNC: 100 MMOL/L (ref 98–107)
CO2 SERPL-SCNC: 30 MMOL/L (ref 22–30)
EOSINOPHIL # BLD AUTO: 0 K/UL (ref 0–0.7)
EOSINOPHIL NFR BLD AUTO: 0 %
ERYTHROCYTE [DISTWIDTH] IN BLOOD BY AUTOMATED COUNT: 3.28 M/UL (ref 3.8–5.4)
ERYTHROCYTE [DISTWIDTH] IN BLOOD: 22.2 % (ref 11.5–15.5)
GLUCOSE BLD-MCNC: 184 MG/DL (ref 75–99)
GLUCOSE BLD-MCNC: 192 MG/DL (ref 75–99)
GLUCOSE BLD-MCNC: 208 MG/DL (ref 75–99)
GLUCOSE BLD-MCNC: 303 MG/DL (ref 75–99)
GLUCOSE SERPL-MCNC: 324 MG/DL (ref 74–99)
HCT VFR BLD AUTO: 30.5 % (ref 34–46)
HGB BLD-MCNC: 8.8 GM/DL (ref 11.4–16)
LYMPHOCYTES # SPEC AUTO: 1.8 K/UL (ref 1–4.8)
LYMPHOCYTES NFR SPEC AUTO: 12 %
MCH RBC QN AUTO: 26.9 PG (ref 25–35)
MCHC RBC AUTO-ENTMCNC: 28.9 G/DL (ref 31–37)
MCV RBC AUTO: 93 FL (ref 80–100)
MONOCYTES # BLD AUTO: 0.9 K/UL (ref 0–1)
MONOCYTES NFR BLD AUTO: 6 %
NEUTROPHILS # BLD AUTO: 12.2 K/UL (ref 1.3–7.7)
NEUTROPHILS NFR BLD AUTO: 80 %
PLATELET # BLD AUTO: 353 K/UL (ref 150–450)
POTASSIUM SERPL-SCNC: 4.3 MMOL/L (ref 3.5–5.1)
SODIUM SERPL-SCNC: 140 MMOL/L (ref 137–145)
WBC # BLD AUTO: 15.2 K/UL (ref 3.8–10.6)

## 2018-01-11 RX ADMIN — CEFAZOLIN SCH MLS/HR: 330 INJECTION, POWDER, FOR SOLUTION INTRAMUSCULAR; INTRAVENOUS at 08:03

## 2018-01-11 RX ADMIN — SODIUM CHLORIDE SCH MLS/HR: 9 INJECTION, SOLUTION INTRAVENOUS at 08:03

## 2018-01-11 RX ADMIN — INSULIN DETEMIR SCH UNIT: 100 INJECTION, SOLUTION SUBCUTANEOUS at 08:02

## 2018-01-11 RX ADMIN — INSULIN ASPART SCH UNIT: 100 INJECTION, SOLUTION INTRAVENOUS; SUBCUTANEOUS at 12:54

## 2018-01-11 RX ADMIN — INSULIN ASPART SCH UNIT: 100 INJECTION, SOLUTION INTRAVENOUS; SUBCUTANEOUS at 08:02

## 2018-01-11 RX ADMIN — LATANOPROST SCH DROPS: 50 SOLUTION OPHTHALMIC at 21:47

## 2018-01-11 RX ADMIN — DEXTROSE MONOHYDRATE SCH MLS/HR: 5 INJECTION, SOLUTION INTRAVENOUS at 21:49

## 2018-01-11 RX ADMIN — INSULIN ASPART SCH UNIT: 100 INJECTION, SOLUTION INTRAVENOUS; SUBCUTANEOUS at 08:01

## 2018-01-11 RX ADMIN — DEXTROSE MONOHYDRATE SCH MLS/HR: 5 INJECTION, SOLUTION INTRAVENOUS at 11:27

## 2018-01-11 RX ADMIN — CEFAZOLIN SCH: 330 INJECTION, POWDER, FOR SOLUTION INTRAMUSCULAR; INTRAVENOUS at 02:22

## 2018-01-11 RX ADMIN — Medication SCH MG: at 21:47

## 2018-01-11 RX ADMIN — BUDESONIDE AND FORMOTEROL FUMARATE DIHYDRATE SCH: 160; 4.5 AEROSOL RESPIRATORY (INHALATION) at 23:24

## 2018-01-11 RX ADMIN — INSULIN ASPART SCH UNIT: 100 INJECTION, SOLUTION INTRAVENOUS; SUBCUTANEOUS at 21:49

## 2018-01-11 RX ADMIN — INSULIN ASPART SCH UNIT: 100 INJECTION, SOLUTION INTRAVENOUS; SUBCUTANEOUS at 17:41

## 2018-01-11 RX ADMIN — BUDESONIDE AND FORMOTEROL FUMARATE DIHYDRATE SCH: 160; 4.5 AEROSOL RESPIRATORY (INHALATION) at 08:41

## 2018-01-11 RX ADMIN — DEXTROSE MONOHYDRATE SCH MLS/HR: 5 INJECTION, SOLUTION INTRAVENOUS at 04:47

## 2018-01-11 RX ADMIN — RIVAROXABAN SCH MG: 10 TABLET, FILM COATED ORAL at 08:02

## 2018-01-11 NOTE — PN
PROGRESS NOTE



DATE OF SERVICE:

01/11/2018



REASON FOR FOLLOWUP:

MRSA bacteremia, pneumonia and positive blood culture with Citrobacter.



INTERVAL HISTORY:

The patient is afebrile. She is breathing more comfortably. She has some cough but it

is dry in nature.  No chest pain.  No abdominal pain.  No nausea, no vomiting and no

diarrhea.



PHYSICAL EXAMINATION:

Blood pressure is 132/59 with a pulse of 73, temperature of 97, she is 98% on 3 L nasal

cannula.

General description is an elderly female, up in the bed, in no distress.

RESPIRATORY SYSTEM:  Unlabored breathing with decreased breath sounds at the base.

HEART:  S1, S2.  Regular rate and rhythm.

ABDOMEN:  Soft, no tenderness.



LABS:

BUN of 36, creatinine 1.16. White count of 15.2, from admission high of 31.6.  Blood

culture repeat 1/09 is negative so far.



DIAGNOSTIC IMPRESSION AND PLAN:

Patient with MRSA pneumonia and bacteremia.  She also have positive blood culture,

Citrobacter, question which was initially thought to be secondary to the pneumonia,

question of the same in the sputum.  Sputum was collected.  She was already on Zosyn,

that could be the reason. Will recommend getting a PICC line tomorrow for blood culture

negative and she will get 2 weeks of IV vancomycin, pharmacy to dose in addition to the

oral Cipro that should cover for the Citrobacter and close outpatient followup.





MMODL / IJN: [id number] / Job#: [id number]

## 2018-01-11 NOTE — P.PN
Subjective


Progress Note Date: 01/11/18


Principal diagnosis: 


Acute bacterial right upper lobe pneumonia, blood cultures positive for 

Citrobacter species with sensitivity to Zosyn and Levaquin





this is a 72-year-old female patient with known history of advanced oxygen-

dependent COPD, biopsy-proven bronchiolitis obliterans with organizing 

pneumonia and morbid obesity was gained significant amount of weight as the 

patient got treated with systemic steroids for BOOP.  The patient has had 

multiple hospitalization for a story complications.  The patient has had been 

evaluated by my partner Dr. Chino.  Following her most hospitalization the 

[place] [facility] [place] and following 

that she was discharged home.  After staying home for around a week the patient 

started having increased cough congestion and wheezing and she was having 

increased shortness of breath.  For that reason she came back to the hospital 

and she was found to have a right upper lobe pneumonia which is up a new onset.

  She was started on examination Zosyn and Levaquin and a pulmonary 

consultation was requested.  The patient has had a chest x-ray at time of 

admission and subsequent chest x-ray from this morning on 01/06/2018 showed 

right upper lobe pneumonia.  She was also started on DuoNeb nebulized 

treatments around the clock.  She has been on 20 mg of prednisone at a time of 

her discharge and this has been as part of a burst taper.  Her current 

prednisone dose is at 10 mg by mouth 3 times a day.





On 01/07/2018 the patient is being seen for a follow-up.  As mentioned earlier 

the patient has an extensive right upper lobe pneumonia in addition to an 

underlying COPD and bronchiolitis obliterans with organizing pneumonia.  The 

patient's blood culture was positive for gram-negative bacillus.  The patient 

is currently on a combination of Zosyn and Levaquin.  This is most likely a gram

-negative bacteria and the final microbiology still pending for now.  No 

pleurisy.  No hemoptysis.  No chest pain.  Clinically the patient is feeling 

better.  She has regained some of her strength back.  She was able to sit up on 

a chair.  She is tolerating her diet.  No nausea or vomiting.  No altered 

mentation.  She is currently on a prednisone which was At a dose of 30 mg on a 

daily basis.





On 1/8/2018, patient is feeling a bit better, receiving treatment for what 

seems to be advanced right upper lobe consolidation, with positive blood 

cultures for gram-negative bacillus.  Patient is now on Zosyn and 

Levaquin.continues to have no major change on the chest x-ray today as far as 

her right upper lobe consolidation is concerned.  Clinically however the 

patient is feeling a bit better.blood cultures are positive for Citrobacter 

amalonaticus sensitive to Zosyn and also sensitive to Levaquin.





On 01/09/2018 patient seen in follow-up on medical surgical floor.  Doing well, 

improving, still gets very dyspneic with exertion, even going to the bedside 

commode, does require periods of rest to recover.  Lung sounds are generally 

diminished, with scattered expiratory wheezes.  But overall she is making slow 

improvement.  FiO2 requirement is down to 3 L per cannula.  Microbiology 

results have been reviewed, sputum culture positive for presumptive staph aureus

, Candida albicans, blood cultures were positive for Citrobacter species, with 

sensitivity to both Zosyn and Levaquin.  Patient has been afebrile, 

hemodynamically stable, vital signs remained stable. 





On 01/10/2018 patient seen in follow-up on medical surgical floor.  She is 

currently resting in bed, she did set up for her breakfast in the chair, she is 

tolerating activity well, does become dyspneic and wheezy on exertion, but 

recovers with periods of rest.Vital signs are stable, patient is afebrile, 

continues on 3 L per nasal cannula with O2 sat at 98%. Blood cultures were 

positive for Citrobacter species, sputum culture was positive for MRSA and 

Candida, follow-up blood cultures from 01/08/2018 shows presumptive MRSA. 

patient is on vancomycin, Zosyn, Levaquin has been discontinued per ID service. 

follow-up chest x-ray from [place] 10 2018 shows some improvement in aeration in 

the right upper lobe.  Discharge planning is in progress for discharge to 

subacute rehab today.





On 01/11/2018 patient seen in follow-up.  Clinically she continues to improve, 

still has some diffuse wheezes, more so on the right side than the left.  Has 

been get not to the chair, and the bedside commode, tolerating activity well.  

Yesterday we were getting ready to clear the patient for discharge to the rehab

, however her blood cultures were found to be positive for MRSA in addition to 

the Citrobacter species.  Follow-up blood cultures were drawn, so far shows no 

growth.  Patient will need placement of the PICC line, and she was already 

initiated on vancomycin, Asian will need outpatient IV vancomycin therapy.  

Remains on Zosyn, ID service is managing the antibiotics.  Otherwise she 

remains afebrile, hemodynamically stable, vital signs are stable.  Today's lab 

work shows downward trending WBC, today's down to 15.2 from 21.4, hemoglobin is 

8.8, down from 9.1, but no evidence of bleeding.  No electrolyte abnormality, 

BUN and creatinine are 36 and 1.16 respectively.  Patient was having some loose 

stools yesterday, stool for C. diff was sent and is negative.








Objective





- Vital Signs


Vital signs: 


 Vital Signs











Temp  97.0 F L  01/11/18 07:00


 


Pulse  73   01/11/18 07:00


 


Resp  16   01/11/18 07:00


 


BP  132/69   01/11/18 07:00


 


Pulse Ox  98   01/11/18 07:00








 Intake & Output











 01/10/18 01/11/18 01/11/18





 18:59 06:59 18:59


 


Intake Total 750  


 


Balance 750  


 


Weight  133 kg 133 kg


 


Intake:   


 


    


 


    Piperacillin-Tazobactam 3 50  





    .375 gm In Dextrose/Water   





    1 50ml.bag @ 12.5 mls/hr   





    IVPB Q8H NED Rx#:   





    [id number]   


 


    Sodium Chloride 0.9% 1, 200  





    000 ml @ 75 mls/hr IV .   





    [id number] NED Rx#:[id number]   


 


  Intake, IV Titration 500  





  Amount   


 


    Vancomycin 2,000 mg In 500  





    Sodium Chloride 0.9% 500   





    ml @ 167 mls/hr IVPB Q24H   





    NED Rx#:[id number]   


 


Other:   


 


  Voiding Method   Bedside Commode


 


  # Voids 3 4 2


 


  # Bowel Movements 2 2 1














- Exam


GENERAL EXAM:  obese.  Alert, active, comfortable in no apparent distress.


HEAD: Normocephalic.


EYES: Normal reaction of pupils, equal size.


NOSE: Clear with pink turbinates.


THROAT: No erythema or exudates.


NECK: No masses, no JVD.


CHEST: No chest wall deformity.


LUNGS: Equal air entry, scattered wheezes heard throughout the lung fields 

bilaterally, more so on the right


CVS: S1 and S2 normal with no audible murmur, regular rhythm.


ABDOMEN: No hepatosplenomegaly, normal bowel sounds, no guarding or rigidity.


SPINE: No scoliosis or deformity


SKIN: No rashes


CENTRAL NERVOUS SYSTEM: No focal deficits, tone is normal in all 4 extremities.


EXTREMITIES: There is changes of chronic peripheral edema.  No clubbing, no 

cyanosis.  Peripheral pulses are intact.











- Labs


CBC & Chem 7: 


 01/11/18 08:39





 01/11/18 08:39


Labs: 


 Abnormal Lab Results - Last 24 Hours (Table)











  01/10/18 01/10/18 01/11/18 Range/Units





  17:15 20:46 07:20 


 


WBC     (3.8-10.6)  k/uL


 


RBC     (3.80-5.40)  m/uL


 


Hgb     (11.4-16.0)  gm/dL


 


Hct     (34.0-46.0)  %


 


MCHC     (31.0-37.0)  g/dL


 


RDW     (11.5-15.5)  %


 


Neutrophils #     (1.3-7.7)  k/uL


 


BUN     (7-17)  mg/dL


 


Creatinine     (0.52-1.04)  mg/dL


 


Glucose     (74-99)  mg/dL


 


POC Glucose (mg/dL)  257 H  315 H  303 H  (75-99)  mg/dL


 


Calcium     (8.4-10.2)  mg/dL














  01/11/18 01/11/18 01/11/18 Range/Units





  08:39 08:39 11:44 


 


WBC   15.2 H   (3.8-10.6)  k/uL


 


RBC   3.28 L   (3.80-5.40)  m/uL


 


Hgb   8.8 L   (11.4-16.0)  gm/dL


 


Hct   30.5 L   (34.0-46.0)  %


 


MCHC   28.9 L   (31.0-37.0)  g/dL


 


RDW   22.2 H   (11.5-15.5)  %


 


Neutrophils #   12.2 H   (1.3-7.7)  k/uL


 


BUN  36 H    (7-17)  mg/dL


 


Creatinine  1.16 H    (0.52-1.04)  mg/dL


 


Glucose  324 H    (74-99)  mg/dL


 


POC Glucose (mg/dL)    192 H  (75-99)  mg/dL


 


Calcium  8.3 L    (8.4-10.2)  mg/dL








 Microbiology - Last 24 Hours (Table)











 01/08/18 11:34 Blood Culture Gram Stain - Final





 Blood Blood Culture - Final





    Methicillin resist S. aureus


 


 01/09/18 13:32 Blood Culture - Preliminary





 Blood    No Growth after 24 hours


 


 01/08/18 09:25 Gram Stain - Final





 Sputum Sputum Culture - Final





    Methicillin resist S. aureus





    Candida albicans














Assessment and Plan


Plan: 


Assessment: 





1 acute bacterial right upper lobe pneumonia, Citrobacter and presumptive MRSA 

are growing in her blood, patient is currently on vancomycin and Zosyn, 

Citrobacter species are sensitive to Zosyn





2 acute sepsis with leukocytosis, white cell count remains elevated at 15.2





3 acute kidney injury secondary to above, improved, today's creatinine is 1.16, 

with a BUN of 36





4 morbid obesity





5 COPD exacerbation secondary to above.  The patient also has chronic hypoxic 

respiratory failure.





6 acute lactic acidosis secondary to above, improving





7 bronchiolitis obliterans with organizing pneumonia maintained on systemic 

steroids





8 chronic steroid use with ongoing weight gain





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





10 restless leg syndrome





11 chronic lower extremity edema





12 chronic urinary incontinence





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





14 chronic diverticulosis





15 previous history of lower extremity cellulitis





16 possible cor pulmonale and obesity hypoventilation syndrome secondary to 

morbid obese body habitus





17 previous history of GI bleeding





Recommendation: 





Clinically patient continues to improve, vital signs are stable, patient is 

afebrile, patient has been get not to the chair, tolerating activity well, but 

not able to walk very far yet.  Blood cultures showed evidence of MRSA in 

addition to the Citrobacter species, follow-up blood cultures show no growth so 

far, PICC line insertion is pending for outpatient vancomycin therapy.  Patient 

remains on Zosyn.  ID service is on.  From our standpoint continue present 

medical treatments, patient remains stable from pulmonary standpoint.





I performed a history & physical examination of the patient and discussed their 

management with my nurse practitioner, Corinne Garcia.  I reviewed the nurse 

practitioner's note and agree with the documented findings and plan of care.  

Lung sounds are positive for diffuse wheezes throughout the lung fields.  The 

findings and the impression was discussed with the patient.  I attest to the 

documentation by the nurse practitioner. 








Time with Patient: Less than 30





Time with Patient: Less than 30

## 2018-01-11 NOTE — P.PN
Progress Note - Text


Progress Note Date: 01/11/18


DATE OF SERVICE: 


01/11/2018





PRESENTING COMPLAINT:


Delirium





HISTORY OF PRESENT ILLNESS:


72-year-old female who was discharged recently went to [facility], and was 

discharged from there on December 30 back to her home.  Had continued 

outpatient rehab after being discharged from [facility] over the last 24 hours 

patient became increasingly tired somewhat more delirious, continues to have 

lower extremity cellulitis which has greatly improved however still present, a 

cough developed with chills and was brought in for further evaluation found to 

be septic.  Admitted for the same.





INTERVAL HISTORY:


01/11/2018:


Sitting up in the chair appears comfortable.  Cough present minimal sputum 

production continues to have wheezing.  Repeat blood cultures returned 

presumptive MRSA.  Able to transfer to and from the bedside commode without any 

assistance.  Tolerating her diet eating between 50 and 75% of her meals.  

Extremity is continue to be moderately reddened but are improving, will 

continue Silvadene cream and Ace wraps.  We'll require a PICC line placement 

due to the positive cultures and the need for IV antibiotics.  Last BM 01/11/ 2018.





01/10/2018:


Lying in bed states she feels cold refused work with physical therapy this 

morning.  Cough still present minimal sputum production although wheezing noted 

no delirium. repeat blood cultures returned presumptive MRSA. Able to transfer 

to and from the bedside commode in the bed without assistance.  Tolerating her 

diet eating between 50 and 75% of her meals.  Lower extremities continue to be 

moderately reddened but are improving.  Last BM 01/09/2018.





01/09/2018:


Patient sitting up on the edge of the bed states she feels great, feeling her 

breathing is improved, cough is still present minor sputum production minor 

wheezing, no delirium.  Able to get to and from the bed and the bedside commode 

without any assistance.  Tolerating her diet eating between 50 and 75% of her 

meals.  Her extremities remain moderately reddened but also are improving.  

Last BM 01/09/2018 01/08/2018:


Patient sitting up in a chair states she feels great,breathing is improved 

cough still presentin production some wheezing, no delirium, able to get from 

the bed to the bedside commode and back without assistance.tolerating her diet 

eating between 40 and 50% of her diet although she did not eat much for 

breakfast.  Lower extremities remain moderately reddened but are also 

improving.  Moved her bowels.





01/07/2018:


Patient sitting up in a chair states she feels much better afebrile, has a cough

, no sputum production and some wheezing, she is awake alert able to answer 

questions.  Ate about 35-50% of her diet for breakfast states her appetite is 

improving.  Up with assistance.  Lower extremities remain moderately reddened 

but also are improving.  Moved her bowels.





01/06/2018:


Patient lying in bed appears somewhat comfortable able to answer simple 

straightforward questions, ate a small amount of her breakfast between 10 and 20

%, is able to get up with assistance and walker, cough is present no sputum 

production.  Currently afebrile, last BM prior to admission.





REVIEW OF SYSTEMS:


Done for constitutional ,cardiovascular, GI, pulmonary with relevant findings 

as above.





CURRENT MEDICATIONS


Acetaminophen, DuoNeb, Symbicort, sliding scale, latanoprost one drop both eyes 

at bedtime,  melatonin 5 mg by mouth at bedtime, Zosyn 3.375 g in IVP back, 

prednisone, Xarelto, Requip, vancomycin.





PHYSICAL EXAM


VITAL SIGNS: 


Temperature 97.0, pulse 73, respiratory rate 16, blood pressure 132/69, oxygen 

saturation 98% on 3 L.





GENERAL APPEARANCE: Sitting up In a chair not in distress. 


HEENT: Pupils equal. Conjunctiva normal. JVD not raised. Mass not palpable.


RESPIRATORY: Respiratory effort increased. Lungs diminished, inspiratory and 

expiratory wheezing to auscultation. 


CARDIOVASCULAR: First and second sounds normal.  Mild  edema. 


ABDOMEN: Soft. Liver and spleen not palpable. No tenderness. No mass palpable. 


PSYCHIATRY: Able to answer simple straightforward questions no confusion today.

  Mood and affect normal. 


INTEGUMENT: Mild edema to both lower extremities redness present below the 

knees bilaterally.





INVESTIGATIONS:


LABS: White blood cell count 15.2, hemoglobin 8.8, BUN 36, creatinine 1.16, Accu

-Cheks noted.


Blood cultures: CIRTOBACTER AMALONATICUS


repeat blood cultures: Presumptive MRSA


Sputum culture: methicillin resistant staph aureus, Candida albicans





ASSESSMENT:


-Acute sepsis, likely due to Right upper lobe pneumonia causing acute delirium, 

with blood cultures positive for Cirtobacter amalonaticus,repeat blood cultures 

positive for presumptive MRSA improving


-Acute bacterial right upper lobe pneumonia, with blood cultures positive for 

cirtobacter amalonaticus, repeat blood cultures with presumptive MRSA and 

sputum culture positive for presumptive staph aureus causing sepsis, improving


-Acute Leukocytosis secondary to acute right upper lobe pneumonia


-Acute kidney injury secondary to acute tubular necrosis and sepsis, improving


-Chronic sigmoid diverticulosis.


-Chronic hypoxic respiratory failure on 3 L of oxygen at home from underlying 

chronic obstructive pulmonary disease.


-Chronic obstructive pulmonary disease in an ex-smoker.


-Obesity hypoventilation syndrome.


-Possible cor pulmonale from obesity hypoventilation syndrome and chronic 

obstructive pulmonary disease and chronic thromboembolic lung disease.


-Diabetes mellitus type 2 chronically on insulin.


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 

Xarelto.


-Chronic low back pain from spinal stenosis.


-Chronic bilateral lower extremity lymphedema.


-Chronic urinary stress incontinence.


-Chronic gait dysfunction uses a walker.


-Chronic restless leg syndrome.


-Chronic history of splenectomy.


-Chronic diverticulosis.


-Chronic bilateral lower extremity cellulitis probably with acute component.





PLAN:


Antibiotics adjusted to include vancomycin and Zosyn on the basis of 

presumptive staph aureus in the sputum culture per infectious disease.repeat 

blood cultures positive negative for growth.  Await PICC line placement for long

-term antibiotic therapy.  Continue bronchodilators and prednisone with daily 

chest x-rays.  Discharge planning for the next 24-48 hours, possibly to 

[facility].  Plan of care discussed at the bedside with patient she is in 

agreement.  We will follow closely.





NP statement: Patient was seen and examined by nurse practitioner Priscilla Martinez and all elements of the case discussed with attending  Dr. Villa

## 2018-01-11 NOTE — PN
PROGRESS NOTE



DATE OF SERVICE:

01/11/2018



ATTENDING NOTE:

This patient was seen and examined by me. I discussed the case with the nurse

practitioner Ms. Martniez.



Patient was admitted with pneumonia.  Cough is greatly improved.  Tolerating a diet.

The patient's blood cultures from 1/8/2018 have grown MRSA and sputum from 1/8/2018

grew MRSA and also candida. Blood cultures from 1/5/2018 show citrobacter.  The blood

cultures from 1/9/2018 are negative.



ASSESSMENT:

Multiple problems with positive blood and sputum cultures.  Awaiting final blood

cultures to remain negative. Antibiotics are being coordinated by Dr. Roach.  Care was

discussed with the patient.  Patient will be getting a PICC line tomorrow.





MMODL / IJN: [id number] / Job#: [id number]

## 2018-01-12 VITALS — DIASTOLIC BLOOD PRESSURE: 86 MMHG | SYSTOLIC BLOOD PRESSURE: 148 MMHG

## 2018-01-12 VITALS — TEMPERATURE: 97.6 F | HEART RATE: 73 BPM

## 2018-01-12 LAB
ANION GAP SERPL CALC-SCNC: 8 MMOL/L
BUN SERPL-SCNC: 36 MG/DL (ref 7–17)
CALCIUM SPEC-MCNC: 9 MG/DL (ref 8.4–10.2)
CELLS COUNTED: 200
CHLORIDE SERPL-SCNC: 102 MMOL/L (ref 98–107)
CO2 SERPL-SCNC: 31 MMOL/L (ref 22–30)
ERYTHROCYTE [DISTWIDTH] IN BLOOD BY AUTOMATED COUNT: 3.38 M/UL (ref 3.8–5.4)
ERYTHROCYTE [DISTWIDTH] IN BLOOD: 21.9 % (ref 11.5–15.5)
GLUCOSE BLD-MCNC: 217 MG/DL (ref 75–99)
GLUCOSE BLD-MCNC: 221 MG/DL (ref 75–99)
GLUCOSE SERPL-MCNC: 205 MG/DL (ref 74–99)
HCT VFR BLD AUTO: 31.3 % (ref 34–46)
HGB BLD-MCNC: 9.3 GM/DL (ref 11.4–16)
LYMPHOCYTES # BLD MANUAL: 1.92 K/UL (ref 1–4.8)
MCH RBC QN AUTO: 27.4 PG (ref 25–35)
MCHC RBC AUTO-ENTMCNC: 29.6 G/DL (ref 31–37)
MCV RBC AUTO: 92.6 FL (ref 80–100)
METAMYELOCYTES # BLD: 0.15 K/UL
MONOCYTES # BLD MANUAL: 1.33 K/UL (ref 0–1)
MYELOCYTES # BLD MANUAL: 0.3 K/UL
NEUTROPHILS NFR BLD MANUAL: 77 %
NEUTS SEG # BLD MANUAL: 11.4 K/UL (ref 1.3–7.7)
PLATELET # BLD AUTO: 416 K/UL (ref 150–450)
POTASSIUM SERPL-SCNC: 5.4 MMOL/L (ref 3.5–5.1)
SODIUM SERPL-SCNC: 141 MMOL/L (ref 137–145)
WBC # BLD AUTO: 14.8 K/UL (ref 3.8–10.6)

## 2018-01-12 PROCEDURE — B518ZZA FLUOROSCOPY OF SUPERIOR VENA CAVA, GUIDANCE: ICD-10-PCS

## 2018-01-12 PROCEDURE — B548ZZA ULTRASONOGRAPHY OF SUPERIOR VENA CAVA, GUIDANCE: ICD-10-PCS

## 2018-01-12 PROCEDURE — 02HV33Z INSERTION OF INFUSION DEVICE INTO SUPERIOR VENA CAVA, PERCUTANEOUS APPROACH: ICD-10-PCS

## 2018-01-12 RX ADMIN — IPRATROPIUM BROMIDE AND ALBUTEROL SULFATE PRN ML: .5; 3 SOLUTION RESPIRATORY (INHALATION) at 07:06

## 2018-01-12 RX ADMIN — BUDESONIDE AND FORMOTEROL FUMARATE DIHYDRATE SCH PUFF: 160; 4.5 AEROSOL RESPIRATORY (INHALATION) at 07:06

## 2018-01-12 RX ADMIN — RIVAROXABAN SCH: 10 TABLET, FILM COATED ORAL at 08:26

## 2018-01-12 RX ADMIN — INSULIN ASPART SCH UNIT: 100 INJECTION, SOLUTION INTRAVENOUS; SUBCUTANEOUS at 08:21

## 2018-01-12 RX ADMIN — INSULIN DETEMIR SCH UNIT: 100 INJECTION, SOLUTION SUBCUTANEOUS at 08:51

## 2018-01-12 RX ADMIN — INSULIN ASPART SCH UNIT: 100 INJECTION, SOLUTION INTRAVENOUS; SUBCUTANEOUS at 12:42

## 2018-01-12 RX ADMIN — INSULIN ASPART SCH UNIT: 100 INJECTION, SOLUTION INTRAVENOUS; SUBCUTANEOUS at 08:22

## 2018-01-12 RX ADMIN — SODIUM CHLORIDE SCH MLS/HR: 9 INJECTION, SOLUTION INTRAVENOUS at 08:23

## 2018-01-12 RX ADMIN — DEXTROSE MONOHYDRATE SCH MLS/HR: 5 INJECTION, SOLUTION INTRAVENOUS at 11:20

## 2018-01-12 RX ADMIN — INSULIN ASPART SCH UNIT: 100 INJECTION, SOLUTION INTRAVENOUS; SUBCUTANEOUS at 12:41

## 2018-01-12 RX ADMIN — DEXTROSE MONOHYDRATE SCH MLS/HR: 5 INJECTION, SOLUTION INTRAVENOUS at 04:26

## 2018-01-12 RX ADMIN — CEFAZOLIN SCH MLS/HR: 330 INJECTION, POWDER, FOR SOLUTION INTRAMUSCULAR; INTRAVENOUS at 00:14

## 2018-01-12 NOTE — P.PN
Subjective


Progress Note Date: 01/12/18


Principal diagnosis: 


Acute bacterial right upper lobe pneumonia, blood cultures positive for 

Citrobacter species with sensitivity to Zosyn and Levaquin





this is a 72-year-old female patient with known history of advanced oxygen-

dependent COPD, biopsy-proven bronchiolitis obliterans with organizing 

pneumonia and morbid obesity was gained significant amount of weight as the 

patient got treated with systemic steroids for BOOP.  The patient has had 

multiple hospitalization for a story complications.  The patient has had been 

evaluated by my partner Dr. Chino.  Following her most hospitalization the 

[place] [facility] [place] and following 

that she was discharged home.  After staying home for around a week the patient 

started having increased cough congestion and wheezing and she was having 

increased shortness of breath.  For that reason she came back to the hospital 

and she was found to have a right upper lobe pneumonia which is up a new onset.

  She was started on examination Zosyn and Levaquin and a pulmonary 

consultation was requested.  The patient has had a chest x-ray at time of 

admission and subsequent chest x-ray from this morning on 01/06/2018 showed 

right upper lobe pneumonia.  She was also started on DuoNeb nebulized 

treatments around the clock.  She has been on 20 mg of prednisone at a time of 

her discharge and this has been as part of a burst taper.  Her current 

prednisone dose is at 10 mg by mouth 3 times a day.





On 01/07/2018 the patient is being seen for a follow-up.  As mentioned earlier 

the patient has an extensive right upper lobe pneumonia in addition to an 

underlying COPD and bronchiolitis obliterans with organizing pneumonia.  The 

patient's blood culture was positive for gram-negative bacillus.  The patient 

is currently on a combination of Zosyn and Levaquin.  This is most likely a gram

-negative bacteria and the final microbiology still pending for now.  No 

pleurisy.  No hemoptysis.  No chest pain.  Clinically the patient is feeling 

better.  She has regained some of her strength back.  She was able to sit up on 

a chair.  She is tolerating her diet.  No nausea or vomiting.  No altered 

mentation.  She is currently on a prednisone which was At a dose of 30 mg on a 

daily basis.





On 1/8/2018, patient is feeling a bit better, receiving treatment for what 

seems to be advanced right upper lobe consolidation, with positive blood 

cultures for gram-negative bacillus.  Patient is now on Zosyn and 

Levaquin.continues to have no major change on the chest x-ray today as far as 

her right upper lobe consolidation is concerned.  Clinically however the 

patient is feeling a bit better.blood cultures are positive for Citrobacter 

amalonaticus sensitive to Zosyn and also sensitive to Levaquin.





On 01/09/2018 patient seen in follow-up on medical surgical floor.  Doing well, 

improving, still gets very dyspneic with exertion, even going to the bedside 

commode, does require periods of rest to recover.  Lung sounds are generally 

diminished, with scattered expiratory wheezes.  But overall she is making slow 

improvement.  FiO2 requirement is down to 3 L per cannula.  Microbiology 

results have been reviewed, sputum culture positive for presumptive staph aureus

, Candida albicans, blood cultures were positive for Citrobacter species, with 

sensitivity to both Zosyn and Levaquin.  Patient has been afebrile, 

hemodynamically stable, vital signs remained stable. 





On 01/10/2018 patient seen in follow-up on medical surgical floor.  She is 

currently resting in bed, she did set up for her breakfast in the chair, she is 

tolerating activity well, does become dyspneic and wheezy on exertion, but 

recovers with periods of rest.Vital signs are stable, patient is afebrile, 

continues on 3 L per nasal cannula with O2 sat at 98%. Blood cultures were 

positive for Citrobacter species, sputum culture was positive for MRSA and 

Candida, follow-up blood cultures from 01/08/2018 shows presumptive MRSA. 

patient is on vancomycin, Zosyn, Levaquin has been discontinued per ID service. 

follow-up chest x-ray from [place] [facility] 2018 shows some improvement in aeration in 

the right upper lobe.  Discharge planning is in progress for discharge to 

subacute rehab today.





On 01/11/2018 patient seen in follow-up.  Clinically she continues to improve, 

still has some diffuse wheezes, more so on the right side than the left.  Has 

been get not to the chair, and the bedside commode, tolerating activity well.  

Yesterday we were getting ready to clear the patient for discharge to the rehab

, however her blood cultures were found to be positive for MRSA in addition to 

the Citrobacter species.  Follow-up blood cultures were drawn, so far shows no 

growth.  Patient will need placement of the PICC line, and she was already 

initiated on vancomycin, Asian will need outpatient IV vancomycin therapy.  

Remains on Zosyn, ID service is managing the antibiotics.  Otherwise she 

remains afebrile, hemodynamically stable, vital signs are stable.  Today's lab 

work shows downward trending WBC, today's down to 15.2 from 21.4, hemoglobin is 

8.8, down from 9.1, but no evidence of bleeding.  No electrolyte abnormality, 

BUN and creatinine are 36 and 1.16 respectively.  Patient was having some loose 

stools yesterday, stool for C. diff was sent and is negative.





On 01/12/2018 patient seen again.  Continues to improve, she is awaiting PICC 

line placement today, for outpatient vancomycin therapy.  Follow blood culture 

from 01/09/2018 showed no growth after 48 hours.  Previous blood cultures were 

positive for MRSA and Citrobacter species.  Antibiotics per ID service.  From 

pulmonary standpoint patient remains stable, lung sounds are still positive for 

diffuse wheezes, right posterior lobe greater than the left.  On 4 L per nasal 

cannula her saturations are between 94-99%.  He has been afebrile, vital signs 

are stable.  His lab work was reviewed, WBC is continuously trending down, down 

to 14.8 today from 15.2 from yesterday.  Hemoglobin is 9.3, sodium is 141, 

potassium 5.4, B1 is 36, creatinine is 1.07.  No specific complaints, acute 

events overnight.  From pulmonary standpoint after patient receives the PICC 

line she is stable for discharge to subacute rehab.








Objective





- Vital Signs


Vital signs: 


 Vital Signs











Temp  97.6 F   01/12/18 07:00


 


Pulse  64   01/12/18 07:16


 


Resp  18   01/12/18 07:06


 


BP  184/96   01/12/18 07:00


 


Pulse Ox  99   01/12/18 07:06








 Intake & Output











 01/11/18 01/12/18 01/12/18





 18:59 06:59 18:59


 


Intake Total  600 


 


Balance  600 


 


Weight 133 kg 133 kg 


 


Intake:   


 


  IV  600 


 


    Sodium Chloride 0.9% 1,  600 





    000 ml @ 75 mls/hr IV .   





    [id number] [facility] Rx#:[id number]   


 


Other:   


 


  Voiding Method Bedside Commode Bedside Commode 


 


  # Voids 2 3 


 


  # Bowel Movements 2  














- Exam


GENERAL EXAM:  obese.  Alert, active, comfortable in no apparent distress.


HEAD: Normocephalic.


EYES: Normal reaction of pupils, equal size.


NOSE: Clear with pink turbinates.


THROAT: No erythema or exudates.


NECK: No masses, no JVD.


CHEST: No chest wall deformity.


LUNGS: Equal air entry, scattered wheezes heard throughout the lung fields 

bilaterally, more so on the right


CVS: S1 and S2 normal with no audible murmur, regular rhythm.


ABDOMEN: No hepatosplenomegaly, normal bowel sounds, no guarding or rigidity.


SPINE: No scoliosis or deformity


SKIN: No rashes


CENTRAL NERVOUS SYSTEM: No focal deficits, tone is normal in all 4 extremities.


EXTREMITIES: There is changes of chronic peripheral edema.  No clubbing, no 

cyanosis.  Peripheral pulses are intact.











- Labs


CBC & Chem 7: 


 01/12/18 07:30





 01/12/18 07:30


Labs: 


 Abnormal Lab Results - Last 24 Hours (Table)











  01/11/18 01/11/18 01/12/18 Range/Units





  17:13 20:55 07:25 


 


WBC     (3.8-10.6)  k/uL


 


RBC     (3.80-5.40)  m/uL


 


Hgb     (11.4-16.0)  gm/dL


 


Hct     (34.0-46.0)  %


 


MCHC     (31.0-37.0)  g/dL


 


RDW     (11.5-15.5)  %


 


Neutrophils # (Manual)     (1.3-7.7)  k/uL


 


Monocytes # (Manual)     (0-1.0)  k/uL


 


Metamyelocytes # (Man)     (0)  k/uL


 


Myelocytes # (Manual)     (0)  k/uL


 


Potassium     (3.5-5.1)  mmol/L


 


Carbon Dioxide     (22-30)  mmol/L


 


BUN     (7-17)  mg/dL


 


Creatinine     (0.52-1.04)  mg/dL


 


Glucose     (74-99)  mg/dL


 


POC Glucose (mg/dL)  208 H  184 H  217 H  (75-99)  mg/dL














  01/12/18 01/12/18 01/12/18 Range/Units





  07:30 07:30 12:35 


 


WBC  14.8 H    (3.8-10.6)  k/uL


 


RBC  3.38 L    (3.80-5.40)  m/uL


 


Hgb  9.3 L    (11.4-16.0)  gm/dL


 


Hct  31.3 L    (34.0-46.0)  %


 


MCHC  29.6 L    (31.0-37.0)  g/dL


 


RDW  21.9 H    (11.5-15.5)  %


 


Neutrophils # (Manual)  11.40 H    (1.3-7.7)  k/uL


 


Monocytes # (Manual)  1.33 H    (0-1.0)  k/uL


 


Metamyelocytes # (Man)  0.15 H    (0)  k/uL


 


Myelocytes # (Manual)  0.30 H    (0)  k/uL


 


Potassium   5.4 H   (3.5-5.1)  mmol/L


 


Carbon Dioxide   31 H   (22-30)  mmol/L


 


BUN   36 H   (7-17)  mg/dL


 


Creatinine   1.07 H   (0.52-1.04)  mg/dL


 


Glucose   205 H   (74-99)  mg/dL


 


POC Glucose (mg/dL)    221 H  (75-99)  mg/dL








 Microbiology - Last 24 Hours (Table)











 01/09/18 13:32 Blood Culture - Preliminary





 Blood    No Growth after 48 hours














Assessment and Plan


Plan: 


Assessment: 





1 acute bacterial right upper lobe pneumonia, Citrobacter and presumptive MRSA 

are growing in her blood, patient is currently on vancomycin and Zosyn, 

Citrobacter species are sensitive to Zosyn





2 acute sepsis with leukocytosis, white cell count remains elevated at 15.2





3 acute kidney injury secondary to above, improved, today's creatinine is 1.16, 

with a BUN of 36





4 morbid obesity





5 COPD exacerbation secondary to above.  The patient also has chronic hypoxic 

respiratory failure.





6 acute lactic acidosis secondary to above, improving





7 bronchiolitis obliterans with organizing pneumonia maintained on systemic 

steroids





8 chronic steroid use with ongoing weight gain





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





10 restless leg syndrome





11 chronic lower extremity edema





12 chronic urinary incontinence





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





14 chronic diverticulosis





15 previous history of lower extremity cellulitis





16 possible cor pulmonale and obesity hypoventilation syndrome secondary to 

morbid obese body habitus





17 previous history of GI bleeding





Recommendation: 





Patient remains stable from pulmonary standpoint, continues to improve, vital 

signs are stable, patient is afebrile, patient has been get not to the chair, 

tolerating activity well, but not able to walk very far yet.  Blood cultures 

showed evidence of MRSA in addition to the Citrobacter species, follow-up blood 

cultures show no growth so far, PICC line insertion is pending for outpatient 

vancomycin therapy.  Patient remains on Zosyn.  ID service is on.  From our 

standpoint, she stable for discharge to [facility] today 

after her PICC line has been placed.





I performed a history & physical examination of the patient and discussed their 

management with my nurse practitioner, Corinne Garcia.  I reviewed the nurse 

practitioner's note and agree with the documented findings and plan of care.  

Lung sounds are positive for diffuse wheezes throughout the lung fields.  The 

findings and the impression was discussed with the patient.  I attest to the 

documentation by the nurse practitioner. 








Time with Patient: Less than 30





Time with Patient: Less than 30

## 2018-01-12 NOTE — DS
DISCHARGE SUMMARY



DATE OF ADMISSION:

01/05/2018.



DATE OF DISCHARGE:

01/12/2018



FINAL DIAGNOSES:

1. Acute sepsis, right upper lobe pneumonia with blood cultures positive for

    Citrobacter amalonaticus and methicillin-resistant Staphylococcus aeruginosa

    causing acute delirium, present on admission.

2. Acute renal failure; could be acute tubular necrosis from sepsis, with creatinine

    up to 1.5 on admission, now down to 1.07.

3. Chronic sigmoid diverticulosis.

4. Chronic hypoxic respiratory failure, on 3 L oxygen at home, from underlying chronic

    obstructive pulmonary disease.

5. Chronic obstructive pulmonary disease in an ex-smoker.

6. Obesity hypoventilation syndrome.

7. Chronic cor pulmonale from chronic obstructive pulmonary disease and obesity

    hypoventilation syndrome and chronic thromboembolic lung disease.

8. Diabetes mellitus, type 2, chronically on insulin.

9. Biopsy-confirmed thrombotic lung disease, for which patient had been on Xarelto.

10.Chronic low back pain from spinal stenosis.

11.Chronic bilateral lower extremity lymphedema.

12.Chronic urinary stress incontinence.

13.Chronic gait dysfunction; uses a walker.

14.Chronic restless leg syndrome.

15.History of splenectomy.

16.Colonic diverticulosis.

17.Chronic bilateral lower extremity cellulitis.



HOSPITAL COURSE:

This patient presented with rather delirium, altered mental status, found to be septic

from pneumonia.  The patient's blood cultures came back positive for Citrobacter

amalonaticus and MRSA.  Repeat blood cultures from 1/9 were negative. Today patient is

doing much better.  Afebrile. Lungs has improved air entry.  Cardiovascular: First and

second sounds normal.  Tolerating a diet.  Psych: Alert and oriented x3. The patient's

white count is 14.8, down from 31.6 on admission. BUN and creatinine are 36 and 1.07.

I discussed with Dr. Roach. The patient is good to get discharged. Also discussed with

the patient.



Discussion and discharge planning more than 35 minutes.



DISCHARGE MEDICATIONS:

1. Ventolin HFA two puffs q.4 p.r.n.

2. Symbicort 160/4.5 two puffs b.i.d.

3. DuoNeb q.6 p.r.n.

4. Tudorza 1 puff b.i.d.

5. Lispro before supper.

6. Amaryl 0.5 daily.

7. Xalatan 0.005% one drop to both eyes at bedtime.

8. Xarelto 20 mg p.o. daily.

9. Prednisone 10 mg p.o. t.i.d.

10.Iron 325 p.o. b.i.d.

11.Hemorrhoid cream topically p.r.n.

12.Kwikpen subcutaneously before breakfast.

13.Vitamin D3 5000 units p.o. at bedtime.

14.Vitamin B12 500 mcg p.o. daily.

15.Melatonin 5 mg at bedtime.

16.Silvadene 1% topical cream b.i.d.

17.Humalog subcutaneously before lunch.

18.Potassium 10 mEq p.o. daily.

19.Requip 0.25 mg p.o. b.i.d. and 0.5 mg p.o. at bedtime.

20.Cipro 500 mg p.o. q.12; 28 tablets.

21.Lasix 40 mg a day.

22.Insulin Aspart 5 units subcutaneously before meals t.i.d.

23.Insulin Lantus 15 units subcutaneously daily.

24.Vancomycin 2 grams IV piggyback q.24 hours for 14 days.

Labs CBC and BMP weekly, the first one to be done in 3 days. Vancomycin levels with

results to Dr. Roach.  Follow up with Dr. Hernández in 1 day, Dr. Roach in 2 weeks, Dr. Enrique in 1 week. Follow Accu-Cheks.



DISPOSITION:

[facility].



Discussion and discharge plan more than 35 minutes.





MMODL / IJN: [id number] / Job#: [id number]

## 2018-01-12 NOTE — IR
PICC LINE PLACEMENT:

 

HISTORY:  Infection requiring long-term antibiotic therapy

 

PROCEDURE:  Ultrasound and fluoroscopic guidance of PICC line placement.

 

COMPLICATIONS:  None

 

ANESTHESIA:  1. 1% Lidocaine locally.

 

FINDINGS/TECHNIQUE:  The procedure was explained to the patient.  The risks, complications, benefits 
and alternatives were discussed and any questions were answered.  Informed consent was obtained.  The
 patient was placed supine on the fluoroscopic table and prepped and draped in the usual sterile [facility]
ion.  Utilizing a  21 gauge needle and sonographic and fluoroscopic guidance, access in the vein was 
achieved and there is placement of a 0.018 guidewire.  The vein is patent.  A 4-F sheath was placed o
sweetie the guidewire.  The guidewire and dilator were removed and a 4-F. PICC line was placed through th
e sheath with the tip at the level of the SVC.  The sheath was removed, the catheter was flushed and 
sutured into position.  The patient was stable throughout the procedure and remained stable upon disc
harge from the Department of Radiology. 

 

The vein puncture was patent under ultrasound. A gray scale image was obtained to document patency of
 the vein punctured.

 

All elements of the maximal barrier technique were utilized.

 

FLUOROSCOPY TIME:  0.4 minutes, one image submitted

 

IMPRESSION: 

Successful PICC line placement under ultrasound and fluoroscopic guidance.

## 2018-01-12 NOTE — PN
PROGRESS NOTE



DATE OF SERVICE:

01/12/2018.



REASON FOR FOLLOWUP:

MRSA pneumonia and bacteremia.



INTERVAL HISTORY:

The patient is afebrile.  She is breathing comfortably.  Occasional cough.  No chest

pain.  No abdominal pain.  No diarrhea.



EXAMINATION:

Blood pressure is 148/86 with a pulse of 84, temperature 98.  She is 99% 4 L nasal

cannula.



General description is an elderly female up in the chair in no distress.  Respiratory

system unlabored breathing, some coarse breath sounds in the bases.  No wheeze.  Heart

S1, S2.  Regular rate and rhythm.  Abdomen soft, no tenderness.



LABS:

Hemoglobin 9.8, white count 14.8 with a BUN of 36, creatinine 1.07. Blood culture

repeat 1/9 has been negative.



DIAGNOSTIC IMPRESSION AND PLAN:

Patient with MRSA bacteremia, pneumonia, overall responding to the vancomycin.  She

will continue pharmacy to dose target trough of 15,  finish at least another 2 weeks

along with oral Cipro 500 twice a day for the same duration with close outpatient

followup. Script written for the patient.





MMODL / IJN: [id number] / Job#: [id number]

## 2018-01-16 NOTE — CDI
Last Revision, December 2017





Documentation Clarification Form



Date: 1/16/2018 9:55:00 AM

From: Daria Bowen

Phone: 

MRN: [id number]

Admit Date: 1/5/2018 5:35:00 PM

Patient Name: Jenifer Elizalde

Visit Number: [id number]

Discharge Date:



ATTENTION: The Clinical Documentation Specialists (CDI) and [facility] Coding Staff 
appreciate your assistance in clarifying documentation. Please respond to the 
clarification below the line at the bottom and electronically sign. The CDI & 
[facility] Coding staff will review the response and follow-up if needed. Please note: 
Queries are made part of the Legal Health Record. If you have any questions, 
please contact the author of this message via ITS.



Dr. Isa Enrique



Microcirculatory pulmonary emboli maintained on anticoagulation with Xarelto is 
documented in the progress notes 1/6, 1/7 1/8, 1/9, 1/10, 1/11, & 1/12.  



Please specify the acuity of this condition with terms such as:

    Acute  

    Chronic

    Acute on chronic

    History of 

    Other (please specify in the medical record)

    Clinically unable to further specify

    Unknown



If you have a question about this query, please contact Corazon Tarango, Coding 
Manager, [facility] at [phone] between 8am and 5pm.

___________________________________________________________________________



Microcirculatory pulmonary emboli maintained on Xarelto, chronic
MTDD

## 2018-02-06 ENCOUNTER — HOSPITAL ENCOUNTER (INPATIENT)
Dept: HOSPITAL 47 - EC | Age: 73
LOS: 3 days | Discharge: NURSING, REHAB OR LONG-TERM CARE | DRG: 871 | End: 2018-02-09
Payer: MEDICARE

## 2018-02-06 DIAGNOSIS — Z79.52: ICD-10-CM

## 2018-02-06 DIAGNOSIS — Z79.4: ICD-10-CM

## 2018-02-06 DIAGNOSIS — K57.30: ICD-10-CM

## 2018-02-06 DIAGNOSIS — Z81.8: ICD-10-CM

## 2018-02-06 DIAGNOSIS — Z86.14: ICD-10-CM

## 2018-02-06 DIAGNOSIS — Z90.89: ICD-10-CM

## 2018-02-06 DIAGNOSIS — J84.89: ICD-10-CM

## 2018-02-06 DIAGNOSIS — J44.0: ICD-10-CM

## 2018-02-06 DIAGNOSIS — Z79.01: ICD-10-CM

## 2018-02-06 DIAGNOSIS — K21.9: ICD-10-CM

## 2018-02-06 DIAGNOSIS — I11.0: ICD-10-CM

## 2018-02-06 DIAGNOSIS — J44.1: ICD-10-CM

## 2018-02-06 DIAGNOSIS — Z99.81: ICD-10-CM

## 2018-02-06 DIAGNOSIS — E66.2: ICD-10-CM

## 2018-02-06 DIAGNOSIS — Z98.42: ICD-10-CM

## 2018-02-06 DIAGNOSIS — G43.909: ICD-10-CM

## 2018-02-06 DIAGNOSIS — E78.5: ICD-10-CM

## 2018-02-06 DIAGNOSIS — G89.29: ICD-10-CM

## 2018-02-06 DIAGNOSIS — R26.9: ICD-10-CM

## 2018-02-06 DIAGNOSIS — Z87.11: ICD-10-CM

## 2018-02-06 DIAGNOSIS — I50.9: ICD-10-CM

## 2018-02-06 DIAGNOSIS — N39.3: ICD-10-CM

## 2018-02-06 DIAGNOSIS — F32.9: ICD-10-CM

## 2018-02-06 DIAGNOSIS — Z90.81: ICD-10-CM

## 2018-02-06 DIAGNOSIS — J96.11: ICD-10-CM

## 2018-02-06 DIAGNOSIS — G25.81: ICD-10-CM

## 2018-02-06 DIAGNOSIS — Z87.19: ICD-10-CM

## 2018-02-06 DIAGNOSIS — Z79.899: ICD-10-CM

## 2018-02-06 DIAGNOSIS — I89.0: ICD-10-CM

## 2018-02-06 DIAGNOSIS — Z82.5: ICD-10-CM

## 2018-02-06 DIAGNOSIS — Z85.42: ICD-10-CM

## 2018-02-06 DIAGNOSIS — Z98.41: ICD-10-CM

## 2018-02-06 DIAGNOSIS — R41.0: ICD-10-CM

## 2018-02-06 DIAGNOSIS — Z86.19: ICD-10-CM

## 2018-02-06 DIAGNOSIS — Z87.891: ICD-10-CM

## 2018-02-06 DIAGNOSIS — A41.9: Primary | ICD-10-CM

## 2018-02-06 DIAGNOSIS — J15.6: ICD-10-CM

## 2018-02-06 DIAGNOSIS — Z90.49: ICD-10-CM

## 2018-02-06 DIAGNOSIS — Z90.710: ICD-10-CM

## 2018-02-06 DIAGNOSIS — M48.00: ICD-10-CM

## 2018-02-06 DIAGNOSIS — Z66: ICD-10-CM

## 2018-02-06 DIAGNOSIS — Z86.711: ICD-10-CM

## 2018-02-06 DIAGNOSIS — I27.81: ICD-10-CM

## 2018-02-06 DIAGNOSIS — E11.9: ICD-10-CM

## 2018-02-06 LAB
ALBUMIN SERPL-MCNC: 3 G/DL (ref 3.5–5)
ALP SERPL-CCNC: 126 U/L (ref 38–126)
ALT SERPL-CCNC: 42 U/L (ref 9–52)
ANION GAP SERPL CALC-SCNC: 8 MMOL/L
APTT BLD: 23.5 SEC (ref 22–30)
AST SERPL-CCNC: 30 U/L (ref 14–36)
BASOPHILS # BLD MANUAL: 0.16 K/UL (ref 0–0.2)
BUN SERPL-SCNC: 20 MG/DL (ref 7–17)
CALCIUM SPEC-MCNC: 8.4 MG/DL (ref 8.4–10.2)
CELLS COUNTED: 200
CHLORIDE SERPL-SCNC: 98 MMOL/L (ref 98–107)
CO2 SERPL-SCNC: 35 MMOL/L (ref 22–30)
EOSINOPHIL # BLD MANUAL: 0.32 K/UL (ref 0–0.7)
ERYTHROCYTE [DISTWIDTH] IN BLOOD BY AUTOMATED COUNT: 3.23 M/UL (ref 3.8–5.4)
ERYTHROCYTE [DISTWIDTH] IN BLOOD: 21.5 % (ref 11.5–15.5)
GLUCOSE BLD-MCNC: 117 MG/DL (ref 75–99)
GLUCOSE BLD-MCNC: 146 MG/DL (ref 75–99)
GLUCOSE SERPL-MCNC: 93 MG/DL (ref 74–99)
HCT VFR BLD AUTO: 31.3 % (ref 34–46)
HGB BLD-MCNC: 9.6 GM/DL (ref 11.4–16)
INR PPP: 1 (ref ?–1.2)
LYMPHOCYTES # BLD MANUAL: 4.48 K/UL (ref 1–4.8)
MCH RBC QN AUTO: 29.7 PG (ref 25–35)
MCHC RBC AUTO-ENTMCNC: 30.5 G/DL (ref 31–37)
MCV RBC AUTO: 97.1 FL (ref 80–100)
MONOCYTES # BLD MANUAL: 0.8 K/UL (ref 0–1)
MYELOCYTES # BLD MANUAL: 0.16 K/UL
NEUTROPHILS NFR BLD MANUAL: 63 %
NEUTS SEG # BLD MANUAL: 10.2 K/UL (ref 1.3–7.7)
PH UR: 6.5 [PH] (ref 5–8)
PLATELET # BLD AUTO: 227 K/UL (ref 150–450)
POTASSIUM SERPL-SCNC: 4 MMOL/L (ref 3.5–5.1)
PROT SERPL-MCNC: 6 G/DL (ref 6.3–8.2)
PT BLD: 10.2 SEC (ref 9–12)
SODIUM SERPL-SCNC: 141 MMOL/L (ref 137–145)
SP GR UR: 1.02 (ref 1–1.03)
SQUAMOUS UR QL AUTO: 16 /HPF (ref 0–4)
UROBILINOGEN UR QL STRIP: <2 MG/DL (ref ?–2)
WBC # BLD AUTO: 16 K/UL (ref 3.8–10.6)
WBC #/AREA URNS HPF: 4 /HPF (ref 0–5)

## 2018-02-06 PROCEDURE — 93005 ELECTROCARDIOGRAM TRACING: CPT

## 2018-02-06 PROCEDURE — 85025 COMPLETE CBC W/AUTO DIFF WBC: CPT

## 2018-02-06 PROCEDURE — 82550 ASSAY OF CK (CPK): CPT

## 2018-02-06 PROCEDURE — 87070 CULTURE OTHR SPECIMN AEROBIC: CPT

## 2018-02-06 PROCEDURE — 83605 ASSAY OF LACTIC ACID: CPT

## 2018-02-06 PROCEDURE — 85610 PROTHROMBIN TIME: CPT

## 2018-02-06 PROCEDURE — 85730 THROMBOPLASTIN TIME PARTIAL: CPT

## 2018-02-06 PROCEDURE — 83735 ASSAY OF MAGNESIUM: CPT

## 2018-02-06 PROCEDURE — 82805 BLOOD GASES W/O2 SATURATION: CPT

## 2018-02-06 PROCEDURE — 96366 THER/PROPH/DIAG IV INF ADDON: CPT

## 2018-02-06 PROCEDURE — 51702 INSERT TEMP BLADDER CATH: CPT

## 2018-02-06 PROCEDURE — 87502 INFLUENZA DNA AMP PROBE: CPT

## 2018-02-06 PROCEDURE — 83880 ASSAY OF NATRIURETIC PEPTIDE: CPT

## 2018-02-06 PROCEDURE — 71045 X-RAY EXAM CHEST 1 VIEW: CPT

## 2018-02-06 PROCEDURE — 71046 X-RAY EXAM CHEST 2 VIEWS: CPT

## 2018-02-06 PROCEDURE — 83036 HEMOGLOBIN GLYCOSYLATED A1C: CPT

## 2018-02-06 PROCEDURE — 94660 CPAP INITIATION&MGMT: CPT

## 2018-02-06 PROCEDURE — 87040 BLOOD CULTURE FOR BACTERIA: CPT

## 2018-02-06 PROCEDURE — 87086 URINE CULTURE/COLONY COUNT: CPT

## 2018-02-06 PROCEDURE — 94760 N-INVAS EAR/PLS OXIMETRY 1: CPT

## 2018-02-06 PROCEDURE — 82553 CREATINE MB FRACTION: CPT

## 2018-02-06 PROCEDURE — 94640 AIRWAY INHALATION TREATMENT: CPT

## 2018-02-06 PROCEDURE — 99291 CRITICAL CARE FIRST HOUR: CPT

## 2018-02-06 PROCEDURE — 93306 TTE W/DOPPLER COMPLETE: CPT

## 2018-02-06 PROCEDURE — 96365 THER/PROPH/DIAG IV INF INIT: CPT

## 2018-02-06 PROCEDURE — 80053 COMPREHEN METABOLIC PANEL: CPT

## 2018-02-06 PROCEDURE — 80061 LIPID PANEL: CPT

## 2018-02-06 PROCEDURE — 81001 URINALYSIS AUTO W/SCOPE: CPT

## 2018-02-06 PROCEDURE — 80048 BASIC METABOLIC PNL TOTAL CA: CPT

## 2018-02-06 PROCEDURE — 36415 COLL VENOUS BLD VENIPUNCTURE: CPT

## 2018-02-06 PROCEDURE — 84484 ASSAY OF TROPONIN QUANT: CPT

## 2018-02-06 PROCEDURE — 87205 SMEAR GRAM STAIN: CPT

## 2018-02-06 PROCEDURE — 85379 FIBRIN DEGRADATION QUANT: CPT

## 2018-02-06 PROCEDURE — 36600 WITHDRAWAL OF ARTERIAL BLOOD: CPT

## 2018-02-06 RX ADMIN — Medication SCH MG: at 20:41

## 2018-02-06 RX ADMIN — CEFAZOLIN SCH MLS/HR: 330 INJECTION, POWDER, FOR SOLUTION INTRAMUSCULAR; INTRAVENOUS at 20:41

## 2018-02-06 RX ADMIN — INSULIN ASPART SCH UNIT: 100 INJECTION, SOLUTION INTRAVENOUS; SUBCUTANEOUS at 18:13

## 2018-02-06 RX ADMIN — LATANOPROST SCH DROPS: 50 SOLUTION OPHTHALMIC at 20:41

## 2018-02-06 RX ADMIN — IPRATROPIUM BROMIDE AND ALBUTEROL SULFATE SCH ML: .5; 3 SOLUTION RESPIRATORY (INHALATION) at 19:00

## 2018-02-06 RX ADMIN — IPRATROPIUM BROMIDE AND ALBUTEROL SULFATE SCH: .5; 3 SOLUTION RESPIRATORY (INHALATION) at 18:07

## 2018-02-06 RX ADMIN — ACETAMINOPHEN PRN MG: 325 TABLET, FILM COATED ORAL at 22:38

## 2018-02-06 RX ADMIN — IPRATROPIUM BROMIDE AND ALBUTEROL SULFATE SCH ML: .5; 3 SOLUTION RESPIRATORY (INHALATION) at 12:35

## 2018-02-06 RX ADMIN — BUDESONIDE AND FORMOTEROL FUMARATE DIHYDRATE SCH PUFF: 160; 4.5 AEROSOL RESPIRATORY (INHALATION) at 19:00

## 2018-02-06 RX ADMIN — INSULIN DETEMIR SCH UNIT: 100 INJECTION, SOLUTION SUBCUTANEOUS at 21:11

## 2018-02-06 RX ADMIN — CEFAZOLIN SCH MLS/HR: 330 INJECTION, POWDER, FOR SOLUTION INTRAMUSCULAR; INTRAVENOUS at 12:32

## 2018-02-06 RX ADMIN — DEXTROSE MONOHYDRATE SCH MLS/HR: 5 INJECTION, SOLUTION INTRAVENOUS at 23:07

## 2018-02-06 NOTE — XR
EXAMINATION TYPE: XR chest 2V

 

DATE OF EXAM: 2/6/2018

 

COMPARISON: 1/10/2018

 

TECHNIQUE: PA and lateral views submitted.

 

HISTORY: Shortness of breath

 

FINDINGS:

Bilateral airspace disease seen with no sizable pleural effusion or pneumothorax. Arthropathy of the 
shoulders. Cannot exclude left hilar mass. Findings are stable.

 

IMPRESSION:

1. Bilateral airspace disease correlate for multifocal pneumonia or CHF.

2. COPD.

3. Cannot exclude left hilar soft tissue mass or adenopathy short-term follow-up CT scan chest recomm
ended.

## 2018-02-06 NOTE — HP
HISTORY AND PHYSICAL



DATE OF ADMISSION:

2018.



PRESENTING COMPLAINT:

Short of breath.



HISTORY OF PRESENTING COMPLAINT:

This is a pleasant 72-year-old patient of Dr. Hernández.  The patient's chronic stable

medical conditions include sigmoid diverticulosis, chronic respiratory failure on 3L

oxygen at home from underlying COPD, obesity hypoventilation syndrome, cor pulmonale,

diabetes mellitus type 2, chronic thromboembolic lung disease for which patient had

been on Xarelto, chronic low back pain due to spinal stenosis, lower extremity

lymphedema, urinary stress incontinence, gait dysfunction, uses a walker, restless legs

syndrome, history of splenectomy and intermittent chronic bilateral lower extremity

cellulitis.  The patient recently had been at [facility] for rehab, had been home for a

short time.  The patient became more and more short of breath to the point patient was

not able to get up and walk.  Slight cough, also developed a fever, shortness of

breath, decreased appetite.  The patient was brought into the ER.  The chest x-ray

confirmed to have bilateral pneumonia.  Patient was admitted for antibiotics.



REVIEW OF SYSTEMS:

CONSTITUTIONAL:  Weak, tired.

HEENT:  None.

RESPIRATORY:  As above.

CARDIOVASCULAR:  None.

GASTROINTESTINAL:  None.

GENITOURINARY:  Incontinence.

DERMATOLOGICAL:  Chronic lower extremity limb changes.

LYMPHATIC:  None.

PSYCHIATRY:  Anxiety, slightly anxious.

NEUROLOGICAL:  None.

MUSCULOSKELETAL:  Chronic pain in the joints.



PAST MEDICAL HISTORY:

Chronic hypoxic respiratory failure on home oxygen, COPD, diabetes, chronic thrombolic

disease, lung biopsy, bilateral lower extremity lymphedema, chronic urinary

incontinence, gait dysfunction, uses a walker, restless legs syndrome, splenectomy,

chronic low back pain, spinal stenosis, perforated bowel ulcer, uterine cancer, left

leg wounds.



PAST SURGICAL HISTORY:

Appendectomy, cholecystectomy, hysterectomy, splenectomy.



SOCIAL HISTORY:

.  Smoked a pack a day for 55 years, stopped over a year ago.



FAMILY HISTORY:

Mother  at age 69, had asthma.



HOME MEDICATIONS:

1. Xarelto 20 mg daily.

2. Prednisone 10 mg p.o. t.i.d.

3. Amaryl 0.5 p.o. daily.

4. Iron 325 p.o. b.i.d.

5. Potassium 10 mEq p.o. daily.

6. Humalog 5 units a.c. t.i.d.

7. Insulin Lantus 15 units subcu q.h.s.

8. Sarna lotion 1 topical daily p.r.n.

9. Breo Ellipta 100/25, 1 puff daily.

10.Xalatan 0.05% 1 drop to both eyes q.h.s.

11.DuoNeb every 6 hours p.r.n.

12.Lasix 40 mg p.o. daily.

13.Ventolin HFA 2 puffs q.4 p.r.n.

14.Tudorza 1 puff b.i.d.

15.Vitamin D3 5000 units p.o. q.h.s.

16.Requip 0.25 p.o. b.i.d.

17.Requip 0.5 q.h.s.

18.Melatonin 5 mg p.o. q.h.s.



ALLERGIES:

None.



EXAMINATION:

VITAL SIGNS:  On presentation, temperature 101.8, pulse 108, respiratory rate 19, blood

pressure 140/65, pulse ox 96% on 3L.

GENERAL APPEARANCE:  Well built, BMI 38.3, sitting up in bed, tired-appearing.

EYES:  Pupils equal.  Conjunctivae normal.

HEENT:  External appearance of nose and ears normal.  Oral cavity:  Dry mucous

membranes.

NECK:  JVD unable to assess.  Mass not palpable.

RESPIRATORY:  Effort increased.

LUNGS:  Diminished breath sounds.  Some wheezing.

CARDIOVASCULAR:  First and second sounds normal.  Minimal edema.

ABDOMEN:  Distended, soft.  Liver and spleen not palpable.  No mass palpable LYMPHATIC:

No lymph nodes palpable in neck or axillae.

EXTREMITIES:  Lower extremity lymphedema is present.

PSYCHIATRY:  Alert and oriented x3. Mood and affect slightly low-appearing.

NEUROLOGICAL:  Pupils equal.  Cranial nerves grossly intact.  Power and sensation

grossly intact.

LOWER EXTREMITIES:  Some scar is present.  Some very slight chronic redness.



INVESTIGATIONS:

White count 16, hemoglobin 9.6.  Potassium 4.0, BUN 20, creatinine is 1.16.  Chest x-

ray reviewed by me shows bilateral infiltrates.



ASSESSMENT:

1. Multilevel bilateral pneumonia, suspect gram-negative organism causing sepsis,

    present on admission.

2. Chronic sigmoid diverticulosis.

3. Chronic hypoxic respiratory failure on 3L oxygen at home from underlying chronic

    obstructive pulmonary disease.

4. Acute chronic obstructive pulmonary disease exacerbation in an ex-smoker.

5. Obesity hypoventilation syndrome.

6. Cor pulmonale from obesity hypoventilation syndrome and chronic obstructive

    pulmonary disease and chronic thromboembolic leg disease.

7. Diabetes mellitus type 2, chronically on insulin.

8. Biopsy-confirmed thromboembolic lung disease for which patient chronically has been

    on Xarelto.

9. Chronic low back pain from spinal stenosis.

10.Chronic bilateral lower extremity lymphedema.

11.Chronic urinary stress incontinence.

12.Gait dysfunction, uses a walker.

13.Chronic restless leg syndrome.

14.History of splenectomy.

15.Colonic diverticulosis.



PLAN:

Care was discussed with the patient.  Home medications resumed, including

bronchodilator.  The patient started on IV Zosyn.  This is a less likely to be

Pseudomonas; hence, will discontinue the Levaquin.  Care was discussed with her.

Questions were answered.  Pulmonary was consulted.  The patient is not really bringing

up any sputum.





MMODL / IJN: [id number] / Job#: [id number]
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
## 2018-02-07 LAB
GLUCOSE BLD-MCNC: 114 MG/DL (ref 75–99)
GLUCOSE BLD-MCNC: 126 MG/DL (ref 75–99)
GLUCOSE BLD-MCNC: 138 MG/DL (ref 75–99)
GLUCOSE BLD-MCNC: 165 MG/DL (ref 75–99)
GLUCOSE BLD-MCNC: 210 MG/DL (ref 75–99)

## 2018-02-07 RX ADMIN — CEFAZOLIN SCH MLS/HR: 330 INJECTION, POWDER, FOR SOLUTION INTRAMUSCULAR; INTRAVENOUS at 21:20

## 2018-02-07 RX ADMIN — INSULIN ASPART SCH: 100 INJECTION, SOLUTION INTRAVENOUS; SUBCUTANEOUS at 14:23

## 2018-02-07 RX ADMIN — IPRATROPIUM BROMIDE AND ALBUTEROL SULFATE SCH: .5; 3 SOLUTION RESPIRATORY (INHALATION) at 16:10

## 2018-02-07 RX ADMIN — INSULIN ASPART SCH UNIT: 100 INJECTION, SOLUTION INTRAVENOUS; SUBCUTANEOUS at 17:54

## 2018-02-07 RX ADMIN — CEFAZOLIN SCH MLS/HR: 330 INJECTION, POWDER, FOR SOLUTION INTRAMUSCULAR; INTRAVENOUS at 17:54

## 2018-02-07 RX ADMIN — IPRATROPIUM BROMIDE AND ALBUTEROL SULFATE SCH ML: .5; 3 SOLUTION RESPIRATORY (INHALATION) at 07:20

## 2018-02-07 RX ADMIN — GLIMEPIRIDE SCH MG: 1 TABLET ORAL at 08:25

## 2018-02-07 RX ADMIN — RIVAROXABAN SCH MG: 10 TABLET, FILM COATED ORAL at 08:25

## 2018-02-07 RX ADMIN — ACETAMINOPHEN PRN MG: 325 TABLET, FILM COATED ORAL at 14:47

## 2018-02-07 RX ADMIN — DEXTROSE MONOHYDRATE SCH MLS/HR: 5 INJECTION, SOLUTION INTRAVENOUS at 16:32

## 2018-02-07 RX ADMIN — DEXTROSE MONOHYDRATE SCH MLS/HR: 5 INJECTION, SOLUTION INTRAVENOUS at 08:24

## 2018-02-07 RX ADMIN — INSULIN DETEMIR SCH UNIT: 100 INJECTION, SOLUTION SUBCUTANEOUS at 21:16

## 2018-02-07 RX ADMIN — FORMOTEROL FUMARATE DIHYDRATE SCH: 20 SOLUTION RESPIRATORY (INHALATION) at 19:22

## 2018-02-07 RX ADMIN — IPRATROPIUM BROMIDE AND ALBUTEROL SULFATE SCH: .5; 3 SOLUTION RESPIRATORY (INHALATION) at 11:11

## 2018-02-07 RX ADMIN — IPRATROPIUM BROMIDE AND ALBUTEROL SULFATE SCH: .5; 3 SOLUTION RESPIRATORY (INHALATION) at 19:22

## 2018-02-07 RX ADMIN — POTASSIUM CHLORIDE SCH MEQ: 750 TABLET, EXTENDED RELEASE ORAL at 08:24

## 2018-02-07 RX ADMIN — Medication SCH MG: at 21:14

## 2018-02-07 RX ADMIN — BUDESONIDE SCH: 1 SUSPENSION RESPIRATORY (INHALATION) at 19:22

## 2018-02-07 RX ADMIN — LATANOPROST SCH DROPS: 50 SOLUTION OPHTHALMIC at 21:15

## 2018-02-07 RX ADMIN — BUDESONIDE AND FORMOTEROL FUMARATE DIHYDRATE SCH PUFF: 160; 4.5 AEROSOL RESPIRATORY (INHALATION) at 07:20

## 2018-02-07 RX ADMIN — CEFAZOLIN SCH: 330 INJECTION, POWDER, FOR SOLUTION INTRAMUSCULAR; INTRAVENOUS at 08:26

## 2018-02-07 RX ADMIN — FUROSEMIDE SCH MG: 40 TABLET ORAL at 08:25

## 2018-02-07 RX ADMIN — INSULIN ASPART SCH UNIT: 100 INJECTION, SOLUTION INTRAVENOUS; SUBCUTANEOUS at 08:25

## 2018-02-07 NOTE — P.PN
Subjective


Progress Note Date: 02/07/18


Principal diagnosis: 


COPD exacerbation complicated by purulent tracheobronchitis and possible 

bronchopneumonia





This is a 72-year-old female who recently was discharged from [facility].  She's had multiple admissions to this hospital with discharge is 

to [facility] in the last 6 months or so.  I see her as her lung doctor.  She has 

a history of both bronchiolitis obliterans organizing pneumonia as well as some 

micro-thrombus pulmonary arteriopathy.  This was diagnosed by a VATS lung 

biopsy done by Dr. Grabiel Banerjee.  The microcirculatory thrombi, she was placed 

on Xarelto.  For the bronchiolitis obliterans she was started on higher doses 

of prednisone and weaned down.  Probably should only be about 10 mg of 

prednisone currently.  Anyway, the patient states that she was not ready for 

discharge from the nursing home.  She told her  she wasn't feeling like 

she was ready she was having chest congestion cough and shortness of breath.  

She apparently was seen back in the emergency room today and admitted with a 

diagnosis of bilateral pneumonia.  Much of her changes are upper lobe and many 

the changes are probably more chronic in nature.  She does have a more distinct 

infiltrate particularly in the right upper lung.  In addition, she has a 

history of COPD diabetes memory impairment pulmonary most him hyperlipidemia 

hypertension acid reflux disease restless leg syndrome urinary incontinence 

chronic lower extremity edema migraine cephalgia cataracts lower extremities 

cellulitis and nonhealing ulcers uterine cancer and gastric perforation.





On 02/07/2018 patient seen in follow-up on medical surgical floor.  She is 

resting comfortably in bed, in no acute distress.  She is on 4 L per nasal 

cannula with O2 sat 96%.  She did have a fever of 101.4F last night at 2300.  

Not much in the way sputum production.  Urine and blood culture show no growth 

at the 24-hour lyly.  Patient's influenza screen was negative.  Lung sounds are 

positive for scattered wheezes.  Patient remains on Zosyn for antibiotic 

coverage, remains on nebulized treatments, Symbicort.  We will place her back 

on her maintenance dose prednisone of 10 mg daily.  Otherwise, increase patient'

s activity, up in the chair as tolerated.  Continue with present medical 

treatment.








Objective





- Vital Signs


Vital signs: 


 Vital Signs











Temp  97.6 F   02/07/18 07:00


 


Pulse  92   02/07/18 07:45


 


Resp  18   02/07/18 07:45


 


BP  129/64   02/07/18 07:00


 


Pulse Ox  96   02/07/18 07:20








 Intake & Output











 02/06/18 02/07/18 02/07/18





 18:59 06:59 18:59


 


Intake Total  200 400


 


Balance  200 400


 


Weight 104.326 kg  


 


Intake:   


 


  Oral  200 400


 


Other:   


 


  Voiding Method Bedpan Bedpan Incontinent





  Incontinent 


 


  # Voids  4 














- Exam


No acute distress, oriented 3.  Nasal O2 in place at 4 L





HEENT examination is grossly unremarkable.  Mucous membranes are moist.  No 

oral lesions.





Neck supple.  Full range of motion.  No adenopathy thyromegaly or neck vein 

distention.





Cardiovascular examination reveals regular rhythm rate.  S1-S2 normal.  No S3 

or S4.  No discernible murmur noted.





Lungs reveal diffuse scattered wheezes, diminished lung sounds overall, no 

rhonchi no rales





Abdomen soft bowel sounds are heard.  No masses or tenderness.





Extremities are intact.  There is bilateral lower extremity edema.  There is 

some mild hyperemia and erythema noted.





Skin is without rash or lesion.





Neurologic examination is brief but nonfocal.








- Labs


CBC & Chem 7: 


 02/06/18 09:25





 02/06/18 09:25


Labs: 


 Abnormal Lab Results - Last 24 Hours (Table)











  02/06/18 02/06/18 02/07/18 Range/Units





  17:16 20:55 02:12 


 


POC Glucose (mg/dL)  146 H  117 H  138 H  (75-99)  mg/dL














  02/07/18 02/07/18 Range/Units





  07:01 11:49 


 


POC Glucose (mg/dL)  114 H  126 H  (75-99)  mg/dL








 Microbiology - Last 24 Hours (Table)











 02/06/18 09:40 Blood Culture - Preliminary





 Blood    No Growth after 24 hours


 


 02/06/18 10:47 Urine Culture - Preliminary





 Urine,Catheterized 














Assessment and Plan


Plan: 


Assessment:





#1.  COPD exacerbation complicated by purulent tracheobronchitis and possible 

bronchopneumonia





#2.  Bronchiolitis obliterans organizing pneumonia





#3.  Microcirculatory pulmonary arteriopathy





#4.  Uterine cancer





#5.  Diabetes mellitus





#6.  Possible cor pulmonale and obesity hypoventilation syndrome secondary to 

morbid obesity and body habitus





#7.  Acid reflux disease





#8.  Hypertension





#9.  Hyperlipidemia





#10.  Restless leg syndrome





#11.  Migraine cephalgia





#12.  Obesity








Plan: 





Continue Zosyn, continue Pulmicort and Perforomist, continue DuoNeb nebulized 

treatments.  We will add her maintenance prednisone dose of 10 mg daily.  

Continue Xarelto.  Collect sputum for culture.  Will await the results of her 

final cultures.  Increase activity as tolerated.  We'll continue to follow





I performed a history & physical examination of the patient and discussed their 

management with my nurse practitioner, Corinne Garcia.  I reviewed the nurse 

practitioner's note and agree with the documented findings and plan of care.  

Lung sounds are positive for diffuse wheezes throughout the lung fields.  The 

findings and the impression was discussed with the patient.  I attest to the 

documentation by the nurse practitioner. 








Time with Patient: Less than 30

## 2018-02-07 NOTE — XR
EXAMINATION TYPE: XR chest 2V

 

DATE OF EXAM: 2/7/2018

 

COMPARISON: 2/6/2018

 

TECHNIQUE: PA and lateral views submitted.

 

HISTORY: Fever

 

FINDINGS:

Bilateral airspace disease seen with no sizable pleural effusion or pneumothorax. Arthropathy of the 
shoulders. Cannot exclude left hilar mass. Findings are stable.

 

 

IMPRESSION: 

1. Bilateral airspace disease correlate for multifocal pneumonia or CHF. 

2. COPD. 

3. Cannot exclude left hilar soft tissue mass or adenopathy short-term follow-up CT scan chest recomm
ended.

## 2018-02-07 NOTE — PN
PROGRESS NOTE



DATE OF SERVICE:

02/07/2018



PRESENTING COMPLAINT:

Delirious, short of breath.



INTERVAL HISTORY:

This patient with bilateral pneumonia.  Patient is talking off things, rather

delirious, not eating much.  Looks a bit distant, still short of breath on oxygen.



REVIEW OF SYSTEM:

Attempted for constitutional, cardiovascular, GI, pulmonary; relevant findings as

above.



CURRENT MEDICATIONS:

Reviewed and include:

1. DuoNeb.

2. IV Zosyn.



EXAMINATION:

Temperature 101.6, pulse 103, respirations 18, blood pressure 108/57 pulse ox 92% on

4L.

GENERAL APPEARANCE:  Sitting up, distant-appearing.

EYES:  Pupils equal.  Conjunctivae normal.

HEENT:  External nose and ears are normal.  Oral cavity:  Dry mucous membranes.

NECK:  JVD unable to unable to assess.  Mass not palpable.

RESPIRATORY: Effort normal.

LUNGS:  Diminished breath sounds.  Some wheezing.

CARDIOVASCULAR:  First and second sounds normal.  Minimal edema.

ABDOMEN:  Distended, soft.  Liver and spleen not palpable.

PSYCHIATRY:  The patient is somewhat delirious, does answer some questions.



INVESTIGATIONS:

White count 16, hemoglobin 9.6.  Potassium 4.0, BUN 20, creatinine 1.16.  Cultures are

negative.



ASSESSMENT:

1. Multilobe bilateral pneumonia, suspect gram-negative organism causing sepsis,

    present on admission slow to respond.  Patient is still spiking fevers.

2. Acute delirium from above.

3. Chronic sigmoid diverticulosis.

4. Chronic hypoxic respiratory failure on 3L oxygen at home from underlying chronic

    obstructive pulmonary disease.

5. Acute chronic obstructive pulmonary disease exacerbation in an ex-smoker.

6. Obesity hypoventilation syndrome.

7. Cor pulmonale from obesity hypoventilation syndrome and chronic obstructive

    pulmonary disease and chronic thromboembolic disease.

8. Diabetes mellitus type 2, chronically on insulin.

9. Biopsy-confirmed thromboembolic lung biopsy for which patient is chronically on

    Xarelto.

10.Chronic low back pain from spinal stenosis.

11.Chronic bilateral lower extremity lymphedema.

12.Chronic urinary stress incontinence.

13.Gait dysfunction, uses a walker.

14.Chronic restless leg syndrome.

15.History of splenectomy.

16.Colonic diverticulosis.



PLAN:

Continue patient on IV Zosyn.  The patient is still spiking fevers.  Cultures are

pending.  The patient is still somewhat delirious.  Increased intake oral fluid.  Keep

the patient on saline.





MMODL / IJN: [id number] / Job#: [id number]

## 2018-02-08 LAB
GLUCOSE BLD-MCNC: 175 MG/DL (ref 75–99)
GLUCOSE BLD-MCNC: 244 MG/DL (ref 75–99)
GLUCOSE BLD-MCNC: 254 MG/DL (ref 75–99)
GLUCOSE BLD-MCNC: 304 MG/DL (ref 75–99)
GLUCOSE BLD-MCNC: 83 MG/DL (ref 75–99)

## 2018-02-08 RX ADMIN — IPRATROPIUM BROMIDE AND ALBUTEROL SULFATE SCH ML: .5; 3 SOLUTION RESPIRATORY (INHALATION) at 19:28

## 2018-02-08 RX ADMIN — RIVAROXABAN SCH MG: 10 TABLET, FILM COATED ORAL at 07:57

## 2018-02-08 RX ADMIN — INSULIN ASPART SCH UNIT: 100 INJECTION, SOLUTION INTRAVENOUS; SUBCUTANEOUS at 07:56

## 2018-02-08 RX ADMIN — INSULIN ASPART SCH UNIT: 100 INJECTION, SOLUTION INTRAVENOUS; SUBCUTANEOUS at 12:37

## 2018-02-08 RX ADMIN — DEXTROSE MONOHYDRATE SCH MLS/HR: 5 INJECTION, SOLUTION INTRAVENOUS at 07:56

## 2018-02-08 RX ADMIN — IPRATROPIUM BROMIDE AND ALBUTEROL SULFATE SCH ML: .5; 3 SOLUTION RESPIRATORY (INHALATION) at 07:23

## 2018-02-08 RX ADMIN — LATANOPROST SCH DROPS: 50 SOLUTION OPHTHALMIC at 20:13

## 2018-02-08 RX ADMIN — GLIMEPIRIDE SCH MG: 1 TABLET ORAL at 07:57

## 2018-02-08 RX ADMIN — ACETAMINOPHEN PRN MG: 325 TABLET, FILM COATED ORAL at 07:56

## 2018-02-08 RX ADMIN — IPRATROPIUM BROMIDE AND ALBUTEROL SULFATE SCH ML: .5; 3 SOLUTION RESPIRATORY (INHALATION) at 11:06

## 2018-02-08 RX ADMIN — FUROSEMIDE SCH MG: 40 TABLET ORAL at 07:57

## 2018-02-08 RX ADMIN — INSULIN ASPART SCH UNIT: 100 INJECTION, SOLUTION INTRAVENOUS; SUBCUTANEOUS at 18:34

## 2018-02-08 RX ADMIN — Medication SCH MG: at 20:12

## 2018-02-08 RX ADMIN — DEXTROSE MONOHYDRATE SCH MLS/HR: 5 INJECTION, SOLUTION INTRAVENOUS at 00:19

## 2018-02-08 RX ADMIN — FORMOTEROL FUMARATE DIHYDRATE SCH MCG: 20 SOLUTION RESPIRATORY (INHALATION) at 19:28

## 2018-02-08 RX ADMIN — CEFAZOLIN SCH: 330 INJECTION, POWDER, FOR SOLUTION INTRAMUSCULAR; INTRAVENOUS at 12:37

## 2018-02-08 RX ADMIN — FORMOTEROL FUMARATE DIHYDRATE SCH MCG: 20 SOLUTION RESPIRATORY (INHALATION) at 07:23

## 2018-02-08 RX ADMIN — IPRATROPIUM BROMIDE AND ALBUTEROL SULFATE SCH: .5; 3 SOLUTION RESPIRATORY (INHALATION) at 15:04

## 2018-02-08 RX ADMIN — BUDESONIDE SCH MG: 1 SUSPENSION RESPIRATORY (INHALATION) at 19:28

## 2018-02-08 RX ADMIN — DEXTROSE MONOHYDRATE SCH MLS/HR: 5 INJECTION, SOLUTION INTRAVENOUS at 15:28

## 2018-02-08 RX ADMIN — INSULIN DETEMIR SCH UNIT: 100 INJECTION, SOLUTION SUBCUTANEOUS at 22:05

## 2018-02-08 RX ADMIN — POTASSIUM CHLORIDE SCH MEQ: 750 TABLET, EXTENDED RELEASE ORAL at 07:57

## 2018-02-08 RX ADMIN — BUDESONIDE SCH MG: 1 SUSPENSION RESPIRATORY (INHALATION) at 07:21

## 2018-02-08 NOTE — P.PN
Subjective


Progress Note Date: 02/08/18


Principal diagnosis: 


COPD exacerbation complicated by purulent tracheobronchitis and possible 

bronchopneumonia





This is a 72-year-old female who recently was discharged from [facility].  She's had multiple admissions to this hospital with discharge is 

to [facility] in the last 6 months or so.  I see her as her lung doctor.  She has 

a history of both bronchiolitis obliterans organizing pneumonia as well as some 

micro-thrombus pulmonary arteriopathy.  This was diagnosed by a VATS lung 

biopsy done by Dr. Grabiel Banerjee.  The microcirculatory thrombi, she was placed 

on Xarelto.  For the bronchiolitis obliterans she was started on higher doses 

of prednisone and weaned down.  Probably should only be about 10 mg of 

prednisone currently.  Anyway, the patient states that she was not ready for 

discharge from the nursing home.  She told her  she wasn't feeling like 

she was ready she was having chest congestion cough and shortness of breath.  

She apparently was seen back in the emergency room today and admitted with a 

diagnosis of bilateral pneumonia.  Much of her changes are upper lobe and many 

the changes are probably more chronic in nature.  She does have a more distinct 

infiltrate particularly in the right upper lung.  In addition, she has a 

history of COPD diabetes memory impairment pulmonary most him hyperlipidemia 

hypertension acid reflux disease restless leg syndrome urinary incontinence 

chronic lower extremity edema migraine cephalgia cataracts lower extremities 

cellulitis and nonhealing ulcers uterine cancer and gastric perforation.





On 02/07/2018 patient seen in follow-up on medical surgical floor.  She is 

resting comfortably in bed, in no acute distress.  She is on 4 L per nasal 

cannula with O2 sat 96%.  She did have a fever of 101.4F last night at 2300.  

Not much in the way sputum production.  Urine and blood culture show no growth 

at the 24-hour lyly.  Patient's influenza screen was negative.  Lung sounds are 

positive for scattered wheezes.  Patient remains on Zosyn for antibiotic 

coverage, remains on nebulized treatments, Symbicort.  We will place her back 

on her maintenance dose prednisone of 10 mg daily.  Otherwise, increase patient'

s activity, up in the chair as tolerated.  Continue with present medical 

treatment.





On 02/08/2018 patient seen again in follow-up.  She is resting comfortably in 

bed, in no acute distress.  She remains on 4 L per nasal cannula with O2 sat at 

95%.  She desaturates to 82% on 4 L with activity, and does recover with rest.  

He remains slightly tachycardic with a heart related 118 BPM, she did have a 

fever of 101.6F on O2 07 2018 at 1453.  She is afebrile this morning.  Lung 

sounds are still positive for end expiratory wheezing, bilaterally, there are 

some scattered crackles over bilateral bases.  Chest x-ray from yesterday O2 07 2018 has been reviewed, and shows bilateral airspace disease represent 

pneumonia or CHF.  Urine and blood cultures remain negative at the 48 hour 

lyly.  Ration continues on IV Zosyn, belies treatments, and prednisone 10 mg 

daily.  








Objective





- Vital Signs


Vital signs: 


 Vital Signs











Temp  97.2 F L  02/08/18 07:00


 


Pulse  85   02/08/18 11:37


 


Resp  18   02/08/18 08:23


 


BP  99/46   02/08/18 07:00


 


Pulse Ox  93 L  02/08/18 11:37








 Intake & Output











 02/07/18 02/08/18 02/08/18





 18:59 06:59 18:59


 


Intake Total 640  300


 


Balance 640  300


 


Weight 104.326 kg  104.326 kg


 


Intake:   


 


  Oral 640  300


 


Other:   


 


  Voiding Method Incontinent Bedpan Bedpan


 


  # Voids 4 2 1


 


  # Bowel Movements 1  1














- Exam


No acute distress, oriented 3.  Nasal O2 in place at 4 L





HEENT examination is grossly unremarkable.  Mucous membranes are moist.  No 

oral lesions.





Neck supple.  Full range of motion.  No adenopathy thyromegaly or neck vein 

distention.





Cardiovascular examination reveals regular rhythm rate.  S1-S2 normal.  No S3 

or S4.  No discernible murmur noted.





Lungs reveal diffuse scattered wheezes, diminished lung sounds overall, no 

rhonchi no rales





Abdomen soft bowel sounds are heard.  No masses or tenderness.





Extremities are intact.  There is bilateral lower extremity edema.  There is 

some mild hyperemia and erythema noted.





Skin is without rash or lesion.





Neurologic examination is brief but nonfocal.








- Labs


CBC & Chem 7: 


 02/06/18 09:25





 02/06/18 09:25


Labs: 


 Abnormal Lab Results - Last 24 Hours (Table)











  02/07/18 02/07/18 02/08/18 Range/Units





  16:44 20:49 02:43 


 


POC Glucose (mg/dL)  165 H  210 H  175 H  (75-99)  mg/dL














  02/08/18 Range/Units





  11:42 


 


POC Glucose (mg/dL)  254 H  (75-99)  mg/dL








 Microbiology - Last 24 Hours (Table)











 02/06/18 09:40 Blood Culture - Preliminary





 Blood    No Growth after 48 hours


 


 02/06/18 10:47 Urine Culture - Final





 Urine,Catheterized 














Assessment and Plan


Plan: 


Assessment:





#1.  COPD exacerbation complicated by purulent tracheobronchitis and possible 

bronchopneumonia





#2.  Bronchiolitis obliterans organizing pneumonia





#3.  Microcirculatory pulmonary arteriopathy





#4.  Uterine cancer





#5.  Diabetes mellitus





#6.  Possible cor pulmonale and obesity hypoventilation syndrome secondary to 

morbid obesity and body habitus





#7.  Acid reflux disease





#8.  Hypertension





#9.  Hyperlipidemia





#10.  Restless leg syndrome





#11.  Migraine cephalgia





#12.  Obesity








Plan: 





Continue Zosyn, continue Pulmicort and Perforomist, continue DuoNeb nebulized 

treatments.  Continue oral prednisone at 10 mg daily, continue Xarelto, sputum 

culture was collected and sent, and remains pending at this time.  Patient's 

vital signs are stable, patient continues to be afebrile.  Increase the patient'

s activity as tolerated.  Today we discussed the patient's CODE STATUS with her

, and in view of her multiple comorbidities, extensive chronic lung issues, 

patient stated she would not want to be intubated and placed on mechanical 

ventilator.  She will discuss this with her family further and let them know 

her wishes.  Otherwise she remains stable, no fevers today, her last fever was 

yesterday in the afternoon.  Her microbiology results remain negative.  We will 

continue with the current medical treatment.


I performed a history & physical examination of the patient and discussed their 

management with my nurse practitioner, Corinne Garcia.  I reviewed the nurse 

practitioner's note and agree with the documented findings and plan of care.  

Lung sounds are positive for diffuse wheezes throughout the lung fields.  The 

findings and the impression was discussed with the patient.  I attest to the 

documentation by the nurse practitioner. 








Time with Patient: Less than 30

## 2018-02-09 VITALS
TEMPERATURE: 98.1 F | HEART RATE: 100 BPM | SYSTOLIC BLOOD PRESSURE: 112 MMHG | RESPIRATION RATE: 20 BRPM | DIASTOLIC BLOOD PRESSURE: 79 MMHG

## 2018-02-09 LAB
ANION GAP SERPL CALC-SCNC: 7 MMOL/L
BUN SERPL-SCNC: 22 MG/DL (ref 7–17)
CALCIUM SPEC-MCNC: 8.5 MG/DL (ref 8.4–10.2)
CELLS COUNTED: 100
CHLORIDE SERPL-SCNC: 98 MMOL/L (ref 98–107)
CO2 SERPL-SCNC: 36 MMOL/L (ref 22–30)
EOSINOPHIL # BLD MANUAL: 0.2 K/UL (ref 0–0.7)
ERYTHROCYTE [DISTWIDTH] IN BLOOD BY AUTOMATED COUNT: 2.91 M/UL (ref 3.8–5.4)
ERYTHROCYTE [DISTWIDTH] IN BLOOD: 20.1 % (ref 11.5–15.5)
GLUCOSE BLD-MCNC: 105 MG/DL (ref 75–99)
GLUCOSE BLD-MCNC: 148 MG/DL (ref 75–99)
GLUCOSE BLD-MCNC: 169 MG/DL (ref 75–99)
GLUCOSE BLD-MCNC: 72 MG/DL (ref 75–99)
GLUCOSE SERPL-MCNC: 64 MG/DL (ref 74–99)
HCT VFR BLD AUTO: 27.9 % (ref 34–46)
HGB BLD-MCNC: 8.2 GM/DL (ref 11.4–16)
LYMPHOCYTES # BLD MANUAL: 1.53 K/UL (ref 1–4.8)
MCH RBC QN AUTO: 28.3 PG (ref 25–35)
MCHC RBC AUTO-ENTMCNC: 29.5 G/DL (ref 31–37)
MCV RBC AUTO: 95.9 FL (ref 80–100)
MONOCYTES # BLD MANUAL: 0.51 K/UL (ref 0–1)
MYELOCYTES # BLD MANUAL: 0.1 K/UL
NEUTROPHILS NFR BLD MANUAL: 77 %
NEUTS SEG # BLD MANUAL: 7.85 K/UL (ref 1.3–7.7)
PLATELET # BLD AUTO: 247 K/UL (ref 150–450)
POTASSIUM SERPL-SCNC: 3.3 MMOL/L (ref 3.5–5.1)
SODIUM SERPL-SCNC: 141 MMOL/L (ref 137–145)
WBC # BLD AUTO: 10.2 K/UL (ref 3.8–10.6)

## 2018-02-09 RX ADMIN — POTASSIUM CHLORIDE SCH MEQ: 20 TABLET, EXTENDED RELEASE ORAL at 14:11

## 2018-02-09 RX ADMIN — IPRATROPIUM BROMIDE AND ALBUTEROL SULFATE SCH: .5; 3 SOLUTION RESPIRATORY (INHALATION) at 15:39

## 2018-02-09 RX ADMIN — DEXTROSE MONOHYDRATE SCH MLS/HR: 5 INJECTION, SOLUTION INTRAVENOUS at 00:07

## 2018-02-09 RX ADMIN — INSULIN ASPART SCH UNIT: 100 INJECTION, SOLUTION INTRAVENOUS; SUBCUTANEOUS at 17:54

## 2018-02-09 RX ADMIN — FORMOTEROL FUMARATE DIHYDRATE SCH: 20 SOLUTION RESPIRATORY (INHALATION) at 07:29

## 2018-02-09 RX ADMIN — DEXTROSE MONOHYDRATE SCH MLS/HR: 5 INJECTION, SOLUTION INTRAVENOUS at 08:35

## 2018-02-09 RX ADMIN — POTASSIUM CHLORIDE SCH MEQ: 20 TABLET, EXTENDED RELEASE ORAL at 13:36

## 2018-02-09 RX ADMIN — DEXTROSE MONOHYDRATE SCH: 5 INJECTION, SOLUTION INTRAVENOUS at 16:04

## 2018-02-09 RX ADMIN — INSULIN ASPART SCH UNIT: 100 INJECTION, SOLUTION INTRAVENOUS; SUBCUTANEOUS at 13:36

## 2018-02-09 RX ADMIN — IPRATROPIUM BROMIDE AND ALBUTEROL SULFATE SCH: .5; 3 SOLUTION RESPIRATORY (INHALATION) at 18:49

## 2018-02-09 RX ADMIN — FUROSEMIDE SCH MG: 40 TABLET ORAL at 08:34

## 2018-02-09 RX ADMIN — POTASSIUM CHLORIDE SCH MEQ: 750 TABLET, EXTENDED RELEASE ORAL at 08:35

## 2018-02-09 RX ADMIN — IPRATROPIUM BROMIDE AND ALBUTEROL SULFATE SCH ML: .5; 3 SOLUTION RESPIRATORY (INHALATION) at 11:09

## 2018-02-09 RX ADMIN — IPRATROPIUM BROMIDE AND ALBUTEROL SULFATE SCH: .5; 3 SOLUTION RESPIRATORY (INHALATION) at 07:29

## 2018-02-09 RX ADMIN — GLIMEPIRIDE SCH MG: 1 TABLET ORAL at 08:34

## 2018-02-09 RX ADMIN — BUDESONIDE SCH: 1 SUSPENSION RESPIRATORY (INHALATION) at 07:29

## 2018-02-09 RX ADMIN — INSULIN ASPART SCH UNIT: 100 INJECTION, SOLUTION INTRAVENOUS; SUBCUTANEOUS at 08:41

## 2018-02-09 RX ADMIN — RIVAROXABAN SCH MG: 10 TABLET, FILM COATED ORAL at 08:35

## 2018-02-09 RX ADMIN — INSULIN ASPART SCH: 100 INJECTION, SOLUTION INTRAVENOUS; SUBCUTANEOUS at 08:36

## 2018-02-09 NOTE — P.PN
Subjective


Progress Note Date: 02/09/18


Principal diagnosis: 


COPD exacerbation complicated by purulent tracheobronchitis and possible 

bronchopneumonia





This is a 72-year-old female who recently was discharged from [facility].  She's had multiple admissions to this hospital with discharge is 

to [facility] in the last 6 months or so.  I see her as her lung doctor.  She has 

a history of both bronchiolitis obliterans organizing pneumonia as well as some 

micro-thrombus pulmonary arteriopathy.  This was diagnosed by a VATS lung 

biopsy done by Dr. Grabiel Banerjee.  The microcirculatory thrombi, she was placed 

on Xarelto.  For the bronchiolitis obliterans she was started on higher doses 

of prednisone and weaned down.  Probably should only be about 10 mg of 

prednisone currently.  Anyway, the patient states that she was not ready for 

discharge from the nursing home.  She told her  she wasn't feeling like 

she was ready she was having chest congestion cough and shortness of breath.  

She apparently was seen back in the emergency room today and admitted with a 

diagnosis of bilateral pneumonia.  Much of her changes are upper lobe and many 

the changes are probably more chronic in nature.  She does have a more distinct 

infiltrate particularly in the right upper lung.  In addition, she has a 

history of COPD diabetes memory impairment pulmonary most him hyperlipidemia 

hypertension acid reflux disease restless leg syndrome urinary incontinence 

chronic lower extremity edema migraine cephalgia cataracts lower extremities 

cellulitis and nonhealing ulcers uterine cancer and gastric perforation.





On 02/07/2018 patient seen in follow-up on medical surgical floor.  She is 

resting comfortably in bed, in no acute distress.  She is on 4 L per nasal 

cannula with O2 sat 96%.  She did have a fever of 101.4F last night at 2300.  

Not much in the way sputum production.  Urine and blood culture show no growth 

at the 24-hour lyly.  Patient's influenza screen was negative.  Lung sounds are 

positive for scattered wheezes.  Patient remains on Zosyn for antibiotic 

coverage, remains on nebulized treatments, Symbicort.  We will place her back 

on her maintenance dose prednisone of 10 mg daily.  Otherwise, increase patient'

s activity, up in the chair as tolerated.  Continue with present medical 

treatment.





On 02/08/2018 patient seen again in follow-up.  She is resting comfortably in 

bed, in no acute distress.  She remains on 4 L per nasal cannula with O2 sat at 

95%.  She desaturates to 82% on 4 L with activity, and does recover with rest.  

He remains slightly tachycardic with a heart related 118 BPM, she did have a 

fever of 101.6F on O2 07 2018 at 1453.  She is afebrile this morning.  Lung 

sounds are still positive for end expiratory wheezing, bilaterally, there are 

some scattered crackles over bilateral bases.  Chest x-ray from yesterday O2 07 2018 has been reviewed, and shows bilateral airspace disease represent 

pneumonia or CHF.  Urine and blood cultures remain negative at the 48 hour 

lyly.  Ration continues on IV Zosyn, belies treatments, and prednisone 10 mg 

daily.





On 02/19/2018 patient seen in follow-up.  Today's chest x-ray shows slight 

improvement in the appearance of multifocal pneumonia.  There is interstitial 

prominence that could be compatible with CHF.  She is afebrile, remains on 3 L 

per nasal cannula with O2 sat 96%.  Other vitals are stable, she is resting 

comfortably in bed, her lung sounds are positive for scattered wheezes 

bilaterally.  Her blood, urine and sputum cultures remain negative so far.  She 

continues on nebulized treatments, on oral prednisone at 10 mg daily, she is on 

her Xarelto, we will give her 1 dose of IV Lasix today. 








Objective





- Vital Signs


Vital signs: 


 Vital Signs











Temp  97.8 F   02/09/18 07:00


 


Pulse  102 H  02/09/18 11:19


 


Resp  22   02/09/18 07:00


 


BP  118/56   02/09/18 07:00


 


Pulse Ox  96   02/09/18 07:00








 Intake & Output











 02/08/18 02/09/18 02/09/18





 18:59 06:59 18:59


 


Intake Total 600  


 


Balance 600  


 


Weight 104.326 kg  


 


Intake:   


 


  Oral 600  


 


Other:   


 


  Voiding Method Bedpan Bedpan 


 


  # Voids 2 4 


 


  # Bowel Movements 1  














- Exam


No acute distress, oriented 3.  Nasal O2 in place at 4 L





HEENT examination is grossly unremarkable.  Mucous membranes are moist.  No 

oral lesions.





Neck supple.  Full range of motion.  No adenopathy thyromegaly or neck vein 

distention.





Cardiovascular examination reveals regular rhythm rate.  S1-S2 normal.  No S3 

or S4.  No discernible murmur noted.





Lungs reveal diffuse scattered wheezes, diminished lung sounds overall, no 

rhonchi no rales





Abdomen soft bowel sounds are heard.  No masses or tenderness.





Extremities are intact.  There is bilateral lower extremity edema.  There is 

some mild hyperemia and erythema noted.





Skin is without rash or lesion.





Neurologic examination is brief but nonfocal.








- Labs


CBC & Chem 7: 


 02/09/18 07:57





 02/09/18 07:57


Labs: 


 Abnormal Lab Results - Last 24 Hours (Table)











  02/08/18 02/08/18 02/09/18 Range/Units





  17:12 20:50 02:12 


 


RBC     (3.80-5.40)  m/uL


 


Hgb     (11.4-16.0)  gm/dL


 


Hct     (34.0-46.0)  %


 


MCHC     (31.0-37.0)  g/dL


 


RDW     (11.5-15.5)  %


 


Neutrophils # (Manual)     (1.3-7.7)  k/uL


 


Myelocytes # (Manual)     (0)  k/uL


 


Potassium     (3.5-5.1)  mmol/L


 


Carbon Dioxide     (22-30)  mmol/L


 


BUN     (7-17)  mg/dL


 


Creatinine     (0.52-1.04)  mg/dL


 


Glucose     (74-99)  mg/dL


 


POC Glucose (mg/dL)  304 H  244 H  148 H  (75-99)  mg/dL














  02/09/18 02/09/18 02/09/18 Range/Units





  07:21 07:57 07:57 


 


RBC   2.91 L   (3.80-5.40)  m/uL


 


Hgb   8.2 L   (11.4-16.0)  gm/dL


 


Hct   27.9 L   (34.0-46.0)  %


 


MCHC   29.5 L   (31.0-37.0)  g/dL


 


RDW   20.1 H   (11.5-15.5)  %


 


Neutrophils # (Manual)   7.85 H   (1.3-7.7)  k/uL


 


Myelocytes # (Manual)   0.10 H   (0)  k/uL


 


Potassium    3.3 L  (3.5-5.1)  mmol/L


 


Carbon Dioxide    36 H  (22-30)  mmol/L


 


BUN    22 H  (7-17)  mg/dL


 


Creatinine    1.47 H  (0.52-1.04)  mg/dL


 


Glucose    64 L  (74-99)  mg/dL


 


POC Glucose (mg/dL)  72 L    (75-99)  mg/dL








 Microbiology - Last 24 Hours (Table)











 02/06/18 09:40 Blood Culture - Preliminary





 Blood    No Growth after 72 hours


 


 02/08/18 08:10 Gram Stain - Preliminary





 Sputum 














Assessment and Plan


Plan: 


Assessment:





#1.  COPD exacerbation complicated by purulent tracheobronchitis and possible 

bronchopneumonia





#2.  Bronchiolitis obliterans organizing pneumonia





#3.  Microcirculatory pulmonary arteriopathy





#4.  Uterine cancer





#5.  Diabetes mellitus





#6.  Possible cor pulmonale and obesity hypoventilation syndrome secondary to 

morbid obesity and body habitus





#7.  Acid reflux disease





#8.  Hypertension





#9.  Hyperlipidemia





#10.  Restless leg syndrome





#11.  Migraine cephalgia





#12.  Obesity








Plan: 





Continue Zosyn, we will switch the Pulmicort and Perforomist to Symbicort, 

continue DuoNeb nebulized treatments, continue oral prednisone.  Patient's 

chest x-ray was reviewed, shows slight improvement in the appearance of the 

multifocal pneumonia, there is interstitial pattern noted, which could be 

consistent with mild CHF.  We will give the patient 1 dose of 40 of milligrams 

of Lasix IV  in addition to her usual oral 40 milligrams.  Otherwise no acute 

events overnight, sputum, urine and blood cultures remain negative.  Discharge 

planning is in progress for transfer back to the [facility] on the [facility] 

rehab.  Dr. Chino could've follow-up with the patient at the [facility] once she 

is discharged there and if she requests to follow up with him there.





I performed a history & physical examination of the patient and discussed their 

management with my nurse practitioner, Corinne Garcia.  I reviewed the nurse 

practitioner's note and agree with the documented findings and plan of care.  

Lung sounds are positive for diffuse wheezes throughout the lung fields.  The 

findings and the impression was discussed with the patient.  I attest to the 

documentation by the nurse practitioner. 








Time with Patient: Less than 30

## 2018-02-09 NOTE — XR
EXAMINATION TYPE: XR chest 2V

 

DATE OF EXAM: 2/9/2018

 

COMPARISON: 2/7/2018

 

TECHNIQUE: PA and lateral views submitted.

 

HISTORY: Pneumonia

 

FINDINGS:

Bilateral areas of infiltrate and interstitial prominence are stable. Elevated left hemidiaphragm. No
 pleural effusion or pneumothorax. Arthropathy of the shoulders. Heart size stable.

 

IMPRESSION:

1. Bilateral airspace disease is stable may represent multifocal pneumonia or CHF. Underlying COPD an
d chronic interstitial lung disease suspected. Correlate clinically. Follow to resolution to exclude 
neoplasm..

## 2018-02-09 NOTE — DS
DISCHARGE SUMMARY



DATE OF ADMISSION:

02/06/2018.



DATE OF DISCHARGE:

02/09/2018



FINAL DIAGNOSES:

1. Multilobar bilateral pneumonia. Suspect Gram-negative organism causing sepsis,

    present on admission.

2. Acute delirium from pneumonia on presentation.

3. Chronic sigmoid diverticulosis.

4. Chronic hypoxic respiratory failure, on 3 liters of oxygen at home, from underlying

    chronic obstructive pulmonary disease.

5. Acute chronic obstructive pulmonary disease exacerbation in an ex-smoker.

6. Obesity hypoventilation syndrome.

7. Cor pulmonale from obesity hypoventilation syndrome and chronic obstructive

    pulmonary disease and chronic thromboembolic lung disease.

8. Diabetes mellitus, type 2, chronically on insulin.

9. 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

    Xarelto.

10.Chronic low back pain from spinal stenosis.

11.Chronic bilateral lower extremity lymphedema.

12.Chronic urinary stress incontinence.

13.Gait dysfunction; uses a walker.

14.Chronic restless leg syndrome.

15.History of splenectomy.

16.Chronic diverticulosis of the colon.

17.CODE STATUS: DO NOT RESUSCITATE.



HOSPITAL COURSE:

This patient presented delirious, septic from bilateral pneumonia, was put on IV Zosyn.

Doing much better, tolerating a diet. Had a bowel movement.



On examination, lungs reveal improved air entry.

PSYCH: Alert and oriented x3.



Care has been discussed with the patient and her . Patient decided to become DO

NOT RESUSCITATE.



CONSULTATION:

Dr. Chino from Pulmonary.



Discussion and discharge planning more than 35 minutes.



DISCHARGE MEDICATIONS:

1. Ventolin HFA 2 puffs q.4 p.r.n.

2. DuoNeb q.6 p.r.n.

3. Tudorza 1 puff b.i.d.

4. Dilantin 0.005% one drop to both eyes at bedtime.

5. Xarelto 20 mg a day.

6. Iron 325 p.o. b.i.d.

7. Vitamin D3 5000 units p.o. at bedtime.

8. Melatonin 5 mg at bedtime.

9. Potassium 10 mEq p.o. daily.

10.Requip 0.25 p.o. b.i.d.

11.Requip 0.5 mg p.o. at bedtime.

12.Breo Ellipta 100/25 one puff daily.

13.Lasix 40 mg p.o. daily.

14.Humalog 5 units before meals t.i.d.

15.Insulin Lispro per scale.

16.Insulin Lantus 15 units subcutaneously at bedtime.

17.Sarna lotion 0.5 to 0.5% topically daily p.r.n.

18.Tylenol 650 mg q.4 p.r.n.

19.Augmentin 875 one tablet p.o. q.12; 6 tablets.

20.Prednisone 10 mg a day.

21.Home oxygen 3 liters to continue.



DISPOSITION:

[facility].



Follow with Dr. Hernández at the [facility].



Discharge planning more than 35 minutes.





MMODL / IJN: [id number] / Job#: [id number]

## 2018-02-11 ENCOUNTER — HOSPITAL ENCOUNTER (INPATIENT)
Dept: HOSPITAL 47 - EC | Age: 73
LOS: 5 days | Discharge: NURSING, REHAB OR LONG-TERM CARE | DRG: 871 | End: 2018-02-16
Payer: MEDICARE

## 2018-02-11 VITALS — BODY MASS INDEX: 49 KG/M2

## 2018-02-11 DIAGNOSIS — L03.116: ICD-10-CM

## 2018-02-11 DIAGNOSIS — J15.6: ICD-10-CM

## 2018-02-11 DIAGNOSIS — Z99.81: ICD-10-CM

## 2018-02-11 DIAGNOSIS — Z87.891: ICD-10-CM

## 2018-02-11 DIAGNOSIS — Z85.42: ICD-10-CM

## 2018-02-11 DIAGNOSIS — M48.00: ICD-10-CM

## 2018-02-11 DIAGNOSIS — F32.9: ICD-10-CM

## 2018-02-11 DIAGNOSIS — E66.2: ICD-10-CM

## 2018-02-11 DIAGNOSIS — J96.22: ICD-10-CM

## 2018-02-11 DIAGNOSIS — I26.99: ICD-10-CM

## 2018-02-11 DIAGNOSIS — Z79.899: ICD-10-CM

## 2018-02-11 DIAGNOSIS — I13.0: ICD-10-CM

## 2018-02-11 DIAGNOSIS — Z90.81: ICD-10-CM

## 2018-02-11 DIAGNOSIS — I50.33: ICD-10-CM

## 2018-02-11 DIAGNOSIS — R74.8: ICD-10-CM

## 2018-02-11 DIAGNOSIS — Z79.4: ICD-10-CM

## 2018-02-11 DIAGNOSIS — N17.9: ICD-10-CM

## 2018-02-11 DIAGNOSIS — I87.8: ICD-10-CM

## 2018-02-11 DIAGNOSIS — K57.30: ICD-10-CM

## 2018-02-11 DIAGNOSIS — E11.22: ICD-10-CM

## 2018-02-11 DIAGNOSIS — G25.81: ICD-10-CM

## 2018-02-11 DIAGNOSIS — A41.50: Primary | ICD-10-CM

## 2018-02-11 DIAGNOSIS — E87.3: ICD-10-CM

## 2018-02-11 DIAGNOSIS — G93.41: ICD-10-CM

## 2018-02-11 DIAGNOSIS — R26.9: ICD-10-CM

## 2018-02-11 DIAGNOSIS — J44.0: ICD-10-CM

## 2018-02-11 DIAGNOSIS — Z86.14: ICD-10-CM

## 2018-02-11 DIAGNOSIS — Z90.49: ICD-10-CM

## 2018-02-11 DIAGNOSIS — E78.5: ICD-10-CM

## 2018-02-11 DIAGNOSIS — I27.81: ICD-10-CM

## 2018-02-11 DIAGNOSIS — I89.0: ICD-10-CM

## 2018-02-11 DIAGNOSIS — N18.3: ICD-10-CM

## 2018-02-11 DIAGNOSIS — J84.116: ICD-10-CM

## 2018-02-11 DIAGNOSIS — G43.909: ICD-10-CM

## 2018-02-11 DIAGNOSIS — Z90.710: ICD-10-CM

## 2018-02-11 DIAGNOSIS — Z79.52: ICD-10-CM

## 2018-02-11 DIAGNOSIS — Z79.01: ICD-10-CM

## 2018-02-11 DIAGNOSIS — L03.115: ICD-10-CM

## 2018-02-11 DIAGNOSIS — J44.1: ICD-10-CM

## 2018-02-11 DIAGNOSIS — K21.9: ICD-10-CM

## 2018-02-11 DIAGNOSIS — J96.21: ICD-10-CM

## 2018-02-11 DIAGNOSIS — N39.3: ICD-10-CM

## 2018-02-11 DIAGNOSIS — G89.29: ICD-10-CM

## 2018-02-11 DIAGNOSIS — Z66: ICD-10-CM

## 2018-02-11 LAB
ALBUMIN SERPL-MCNC: 3 G/DL (ref 3.5–5)
ALP SERPL-CCNC: 147 U/L (ref 38–126)
ALT SERPL-CCNC: 36 U/L (ref 9–52)
ANION GAP SERPL CALC-SCNC: 8 MMOL/L
APTT BLD: 25.3 SEC (ref 22–30)
AST SERPL-CCNC: 32 U/L (ref 14–36)
BUN SERPL-SCNC: 24 MG/DL (ref 7–17)
CALCIUM SPEC-MCNC: 8.3 MG/DL (ref 8.4–10.2)
CELLS COUNTED: 200
CHLORIDE SERPL-SCNC: 93 MMOL/L (ref 98–107)
CK SERPL-CCNC: 36 U/L (ref 30–135)
CK SERPL-CCNC: 46 U/L (ref 30–135)
CK SERPL-CCNC: 47 U/L (ref 30–135)
CO2 SERPL-SCNC: 38 MMOL/L (ref 22–30)
D DIMER PPP FEU-MCNC: 0.94 MG/L FEU (ref ?–0.6)
EOSINOPHIL # BLD MANUAL: 0.33 K/UL (ref 0–0.7)
ERYTHROCYTE [DISTWIDTH] IN BLOOD BY AUTOMATED COUNT: 2.95 M/UL (ref 3.8–5.4)
ERYTHROCYTE [DISTWIDTH] IN BLOOD: 20.5 % (ref 11.5–15.5)
GLUCOSE BLD-MCNC: 116 MG/DL (ref 75–99)
GLUCOSE BLD-MCNC: 259 MG/DL (ref 75–99)
GLUCOSE SERPL-MCNC: 93 MG/DL (ref 74–99)
HCT VFR BLD AUTO: 27.9 % (ref 34–46)
HGB BLD-MCNC: 8.5 GM/DL (ref 11.4–16)
INR PPP: 1.1 (ref ?–1.2)
LYMPHOCYTES # BLD MANUAL: 7.05 K/UL (ref 1–4.8)
MAGNESIUM SPEC-SCNC: 1.6 MG/DL (ref 1.6–2.3)
MCH RBC QN AUTO: 28.7 PG (ref 25–35)
MCHC RBC AUTO-ENTMCNC: 30.3 G/DL (ref 31–37)
MCV RBC AUTO: 94.8 FL (ref 80–100)
METAMYELOCYTES # BLD: 0.16 K/UL
MONOCYTES # BLD MANUAL: 0.49 K/UL (ref 0–1)
NEUTROPHILS NFR BLD MANUAL: 51 %
NEUTS SEG # BLD MANUAL: 8.6 K/UL (ref 1.3–7.7)
PLATELET # BLD AUTO: 297 K/UL (ref 150–450)
POTASSIUM SERPL-SCNC: 3.8 MMOL/L (ref 3.5–5.1)
PROT SERPL-MCNC: 6.2 G/DL (ref 6.3–8.2)
PT BLD: 10.5 SEC (ref 9–12)
SODIUM SERPL-SCNC: 139 MMOL/L (ref 137–145)
TROPONIN I SERPL-MCNC: 0.11 NG/ML (ref 0–0.03)
TROPONIN I SERPL-MCNC: 0.15 NG/ML (ref 0–0.03)
TROPONIN I SERPL-MCNC: 0.17 NG/ML (ref 0–0.03)
WBC # BLD AUTO: 16.4 K/UL (ref 3.8–10.6)

## 2018-02-11 PROCEDURE — 94640 AIRWAY INHALATION TREATMENT: CPT

## 2018-02-11 PROCEDURE — 83735 ASSAY OF MAGNESIUM: CPT

## 2018-02-11 PROCEDURE — 83036 HEMOGLOBIN GLYCOSYLATED A1C: CPT

## 2018-02-11 PROCEDURE — 94660 CPAP INITIATION&MGMT: CPT

## 2018-02-11 PROCEDURE — 93306 TTE W/DOPPLER COMPLETE: CPT

## 2018-02-11 PROCEDURE — 87502 INFLUENZA DNA AMP PROBE: CPT

## 2018-02-11 PROCEDURE — 82805 BLOOD GASES W/O2 SATURATION: CPT

## 2018-02-11 PROCEDURE — 80053 COMPREHEN METABOLIC PANEL: CPT

## 2018-02-11 PROCEDURE — 71046 X-RAY EXAM CHEST 2 VIEWS: CPT

## 2018-02-11 PROCEDURE — 87070 CULTURE OTHR SPECIMN AEROBIC: CPT

## 2018-02-11 PROCEDURE — 85379 FIBRIN DEGRADATION QUANT: CPT

## 2018-02-11 PROCEDURE — 85610 PROTHROMBIN TIME: CPT

## 2018-02-11 PROCEDURE — 83605 ASSAY OF LACTIC ACID: CPT

## 2018-02-11 PROCEDURE — 36600 WITHDRAWAL OF ARTERIAL BLOOD: CPT

## 2018-02-11 PROCEDURE — 96365 THER/PROPH/DIAG IV INF INIT: CPT

## 2018-02-11 PROCEDURE — 87205 SMEAR GRAM STAIN: CPT

## 2018-02-11 PROCEDURE — 99291 CRITICAL CARE FIRST HOUR: CPT

## 2018-02-11 PROCEDURE — 82550 ASSAY OF CK (CPK): CPT

## 2018-02-11 PROCEDURE — 87040 BLOOD CULTURE FOR BACTERIA: CPT

## 2018-02-11 PROCEDURE — 80048 BASIC METABOLIC PNL TOTAL CA: CPT

## 2018-02-11 PROCEDURE — 36415 COLL VENOUS BLD VENIPUNCTURE: CPT

## 2018-02-11 PROCEDURE — 80061 LIPID PANEL: CPT

## 2018-02-11 PROCEDURE — 93005 ELECTROCARDIOGRAM TRACING: CPT

## 2018-02-11 PROCEDURE — 83880 ASSAY OF NATRIURETIC PEPTIDE: CPT

## 2018-02-11 PROCEDURE — 82553 CREATINE MB FRACTION: CPT

## 2018-02-11 PROCEDURE — 71045 X-RAY EXAM CHEST 1 VIEW: CPT

## 2018-02-11 PROCEDURE — 85025 COMPLETE CBC W/AUTO DIFF WBC: CPT

## 2018-02-11 PROCEDURE — 84484 ASSAY OF TROPONIN QUANT: CPT

## 2018-02-11 PROCEDURE — 85730 THROMBOPLASTIN TIME PARTIAL: CPT

## 2018-02-11 RX ADMIN — Medication SCH UNIT: at 21:18

## 2018-02-11 RX ADMIN — INSULIN DETEMIR SCH UNIT: 100 INJECTION, SOLUTION SUBCUTANEOUS at 21:17

## 2018-02-11 RX ADMIN — Medication SCH MG: at 21:17

## 2018-02-11 RX ADMIN — INSULIN ASPART SCH UNIT: 100 INJECTION, SOLUTION INTRAVENOUS; SUBCUTANEOUS at 17:47

## 2018-02-11 RX ADMIN — LATANOPROST SCH DROPS: 50 SOLUTION OPHTHALMIC at 21:18

## 2018-02-11 RX ADMIN — MEROPENEM SCH MLS/HR: 1 INJECTION, POWDER, FOR SOLUTION INTRAVENOUS at 21:17

## 2018-02-11 RX ADMIN — MUPIROCIN SCH APPLIC: 20 OINTMENT TOPICAL at 21:19

## 2018-02-11 RX ADMIN — Medication SCH MG: at 21:38

## 2018-02-11 RX ADMIN — INSULIN ASPART SCH UNIT: 100 INJECTION, SOLUTION INTRAVENOUS; SUBCUTANEOUS at 12:15

## 2018-02-11 RX ADMIN — MUPIROCIN SCH APPLIC: 20 OINTMENT TOPICAL at 16:25

## 2018-02-11 RX ADMIN — BUDESONIDE SCH MG: 1 SUSPENSION RESPIRATORY (INHALATION) at 21:07

## 2018-02-11 RX ADMIN — IPRATROPIUM BROMIDE AND ALBUTEROL SULFATE SCH ML: .5; 3 SOLUTION RESPIRATORY (INHALATION) at 21:07

## 2018-02-11 RX ADMIN — BUDESONIDE AND FORMOTEROL FUMARATE DIHYDRATE SCH: 160; 4.5 AEROSOL RESPIRATORY (INHALATION) at 21:07
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
## 2018-02-11 NOTE — HP
HISTORY AND PHYSICAL



DATE OF ADMISSION:

2018.



PRESENTING COMPLAINT:

Short of breath and wheezing.



HISTORY OF PRESENTING COMPLAINT:

This is a very pleasant 72-year-old patient who was in the hospital from to 18

and discharged on 2018.  On the last admission, the patient had multilevel

bilateral pneumonia causing sepsis and delirium.  The patient responded well to

antibiotics.  Patient was discharged on Augmentin.  The patient does wear 3 L of oxygen

at home.  The patient was brought back from the [facility] complaining of increasing

difficulty breathing.  She is using accessory muscles and the patient was found to be

using accessory muscles reported to be hot and patient of course reporting that she is

wheezing more, had a cough.  Patient was thereby admitted to the hospital and put on

oxygen.



The patient's chronic stable medical conditions include sigmoid diverticulosis, chronic

hypoxic respiratory failure on 3 L of oxygen, COPD, obesity hypoventilation syndrome,

cor pulmonale, diabetes mellitus type 2, chronic thromboembolic lung disease for which

patient has Xarelto, chronic low back pain due to spinal stenosis, lower extremity

lymphedema, chronic cellulitis, urinary stress incontinence, gait dysfunction, uses a

walker.  Restless legs syndrome.  History of splenectomy, lower extremity lymphedema.

The patient is currently at [facility] like stated above.



REVIEW OF SYSTEMS:

Constitutional:  Weak tired, felt hot.  HEENT none.  Respiratory as above.

Cardiovascular none.  Gastrointestinal none.  Genitourinary:  Incontinence.

Dermatological:  Chronic lower extremity cellulitis and edema.  Lymphatics:  Lower

extremity edema.  Psychiatry anxiety.  Neurological none.  Musculoskeletal:  Chronic

pain in the joints.



PAST MEDICAL HISTORY:

Chronic hypoxic respiratory failure on oxygen 3 L, COPD, diabetes, chronic

thromboembolic lung disease confirmed by lung biopsy, bilateral lower extremity

lymphedema, chronic urinary incontinence, gait dysfunction, uses a walker.  Restless

legs syndrome.  Splenectomy, spinal stenosis, perforated _____ulcer, uterine cancer,

left leg wounds.



PAST SURGICAL HISTORY:

Appendectomy, cholecystectomy, hysterectomy, splenectomy.



SOCIAL HISTORY:

.  Smoked a pack a day for 55 years.  Stopped in 2017.



FAMILY HISTORY:

Mother  age of 69 and had asthma.



HOME MEDICATIONS:

On discharge:

1. Ventolin HFA 2 puffs q.4h p.r.n.

2. DuoNeb q.6 p.r.n.

3. Tudorza 1 puff b.i.d.

4. Dilantin 0.05% 1 drop to both eyes at bedtime.

5. Xarelto 20 mg a day.

6. Iron 325 p.o. b.i.d.

7. Vitamin D3 5000 units p.o. at bedtime.

8. Melatonin 5 mg p.o. q.h.s.

9. Potassium 10 mEq p.o. daily.

10.Requip 0.25 p.o. b.i.d.

11.Requip 0.5 mg p.o. q.h.s.

12.Breo Ellipta 100/25 1 puff daily.

13.Lasix 40 mg p.o. daily.

14.Humalog 5 units before meals t.i.d.

15.Insulin lispro per scale.

16.Insulin Lantus 15 units subcu q.h.s.

17.Salnal lotion topical p.r.n.

18.Tylenol 650 mg q.4 p.r.n.

19.Augmentin 875 p.o. q.12.

20.Prednisone 10 mg a day.

21.Oxygen 3 L a day.



PHYSICAL EXAMINATION:

Vital signs on presentation: Temperature 100.4, pulse 104, respiratory 22 to 28, blood

pressure 137/64, pulse ox 91% on 5 L.

GENERAL APPEARANCE:  General appearance:  Well built, BMI was 38.  Sitting up, short of

breath tired appearing.  Eyes pupils are equal. Conjunctivae normal. HEENT external of

nose and ears normal.  Oral cavity dry mucous membranes.  Neck JVD unable to assess.

Mass not palpable.  Respiratory effort increased.

LUNGS:  Diminished breath sounds.  Prolonged expiration and wheezing.  Even audible.

Cardiovascular 1st and 2nd sounds normal.  Some edema.  Abdomen distended.  Soft.

Liver spleen and spleen not palpable.  No mass palpable.  Lymphatics:  No lymph nodes

palpable in the neck and axilla.  Extremities chronic lower extremity lymphedema and

chronic redness below the knee.  Psych:  Alert, oriented x3.  Mood and affect tired

appearing.  Neurological:  Pupils are equal. Cranial nerves grossly intact. Power and

sensation grossly intact.



INVESTIGATIONS:

White count 16.4, increased from 10.2 on patient discharge, hemoglobin 8.5, increased

neutrophils at 8.6, potassium 3.8, BUN 24, creatinine 1.18.  Troponin 0.167.  Chest x-

ray reviewed, compared to the last admission, maybe some increase in infiltrate.



ASSESSMENT:

1. Acute bilateral pneumonia with possibly worsening infiltrate and a possible sepsis

    on admission with worsening of white count, patient being febrile and symptomatic.

2. Chronic sigmoid diverticulosis.

3. Chronic hypoxic respiratory failure on 3 L oxygen at home from underlying chronic

    obstructive pulmonary disease.

4. Acute chronic obstructive pulmonary disease exacerbation in an ex-smoker.

5. Obesity hypoventilation syndrome.

6. Cor pulmonale from above conditions.

7. Diabetes mellitus type 2, chronically on insulin.

8. Biopsy confirmed thromboembolic lung disease for which patient is on chronic on

    Xarelto.

9. Chronic low back pain.  Spinal stenosis.

10.Chronic bilateral lower extremity lymphedema.

11.Chronic urinary stress incontinence.

12.Gait dysfunction uses a walker.

13.Chronic lower extremity cellulitis.

14.Chronic restless leg syndrome.

15.History of splenectomy.

16.Colonic diverticulosis.

17.CODE STATUS DNR.



PLAN:

The patient had been on Augmentin.  Will switch the patient over to meropenem.

Continue with nebulized bronchodilator.  Will add inhaled steroids and increase the

prednisone to 40 mg.  Other medication and treatment plan is to continue.  Copy to Dr. Hernández.





MMLAWSON / JEIMYN: [id number] / Job#: [id number]

## 2018-02-11 NOTE — XR
EXAMINATION TYPE: XR chest 2V

 

DATE OF EXAM: 2/11/2018

 

HISTORY: difficulty breathing.

 

REFERENCE: Previous study dated 2/9/2018.

 

FINDINGS: There continue to be bilateral areas of airspace disease not significantly changed from pre
vious. There is improvement in the degree of interstitial change. Heart size upper limits of normal. 
No definite pleural fluid is seen.

 

IMPRESSION: 

1. IMPROVING INTERSTITIAL CHANGE MAY REFLECT IMPROVING INTERSTITIAL PNEUMONIA OR CONGESTIVE HEART AMBER
LURE.

2. CONTINUING BILATERAL AREAS OF CONSOLIDATION.
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
## 2018-02-12 LAB
CHOLEST SERPL-MCNC: 176 MG/DL (ref ?–200)
GLUCOSE BLD-MCNC: 184 MG/DL (ref 75–99)
GLUCOSE BLD-MCNC: 252 MG/DL (ref 75–99)
GLUCOSE BLD-MCNC: 307 MG/DL (ref 75–99)
GLUCOSE BLD-MCNC: 338 MG/DL (ref 75–99)
HDLC SERPL-MCNC: 57 MG/DL (ref 40–60)
LDLC SERPL CALC-MCNC: 86 MG/DL (ref 0–99)
TRIGL SERPL-MCNC: 167 MG/DL (ref ?–150)

## 2018-02-12 RX ADMIN — LATANOPROST SCH DROPS: 50 SOLUTION OPHTHALMIC at 22:18

## 2018-02-12 RX ADMIN — MEROPENEM SCH MLS/HR: 1 INJECTION, POWDER, FOR SOLUTION INTRAVENOUS at 22:18

## 2018-02-12 RX ADMIN — FUROSEMIDE SCH MG: 40 TABLET ORAL at 15:29

## 2018-02-12 RX ADMIN — INSULIN ASPART SCH UNIT: 100 INJECTION, SOLUTION INTRAVENOUS; SUBCUTANEOUS at 22:17

## 2018-02-12 RX ADMIN — IPRATROPIUM BROMIDE AND ALBUTEROL SULFATE SCH ML: .5; 3 SOLUTION RESPIRATORY (INHALATION) at 01:10

## 2018-02-12 RX ADMIN — BUDESONIDE SCH: 1 SUSPENSION RESPIRATORY (INHALATION) at 20:16

## 2018-02-12 RX ADMIN — ASPIRIN 325 MG ORAL TABLET SCH MG: 325 PILL ORAL at 07:59

## 2018-02-12 RX ADMIN — BUDESONIDE AND FORMOTEROL FUMARATE DIHYDRATE SCH: 160; 4.5 AEROSOL RESPIRATORY (INHALATION) at 08:23

## 2018-02-12 RX ADMIN — IPRATROPIUM BROMIDE AND ALBUTEROL SULFATE SCH: .5; 3 SOLUTION RESPIRATORY (INHALATION) at 15:51

## 2018-02-12 RX ADMIN — MEROPENEM SCH MLS/HR: 1 INJECTION, POWDER, FOR SOLUTION INTRAVENOUS at 00:40

## 2018-02-12 RX ADMIN — INSULIN DETEMIR SCH UNIT: 100 INJECTION, SOLUTION SUBCUTANEOUS at 22:17

## 2018-02-12 RX ADMIN — MUPIROCIN SCH APPLIC: 20 OINTMENT TOPICAL at 15:29

## 2018-02-12 RX ADMIN — Medication SCH MG: at 22:18

## 2018-02-12 RX ADMIN — BUDESONIDE SCH MG: 1 SUSPENSION RESPIRATORY (INHALATION) at 08:23

## 2018-02-12 RX ADMIN — INSULIN ASPART SCH UNIT: 100 INJECTION, SOLUTION INTRAVENOUS; SUBCUTANEOUS at 12:52

## 2018-02-12 RX ADMIN — MUPIROCIN SCH APPLIC: 20 OINTMENT TOPICAL at 22:16

## 2018-02-12 RX ADMIN — RIVAROXABAN SCH MG: 10 TABLET, FILM COATED ORAL at 07:58

## 2018-02-12 RX ADMIN — POTASSIUM CHLORIDE SCH MEQ: 750 TABLET, EXTENDED RELEASE ORAL at 07:58

## 2018-02-12 RX ADMIN — Medication SCH UNIT: at 22:17

## 2018-02-12 RX ADMIN — MUPIROCIN SCH APPLIC: 20 OINTMENT TOPICAL at 08:00

## 2018-02-12 RX ADMIN — INSULIN ASPART SCH UNIT: 100 INJECTION, SOLUTION INTRAVENOUS; SUBCUTANEOUS at 18:08

## 2018-02-12 RX ADMIN — INSULIN ASPART SCH UNIT: 100 INJECTION, SOLUTION INTRAVENOUS; SUBCUTANEOUS at 17:47

## 2018-02-12 RX ADMIN — INSULIN ASPART SCH UNIT: 100 INJECTION, SOLUTION INTRAVENOUS; SUBCUTANEOUS at 08:27

## 2018-02-12 RX ADMIN — Medication SCH MG: at 22:17

## 2018-02-12 RX ADMIN — Medication SCH MG: at 07:59

## 2018-02-12 RX ADMIN — IPRATROPIUM BROMIDE AND ALBUTEROL SULFATE SCH ML: .5; 3 SOLUTION RESPIRATORY (INHALATION) at 08:23

## 2018-02-12 RX ADMIN — BUDESONIDE AND FORMOTEROL FUMARATE DIHYDRATE SCH: 160; 4.5 AEROSOL RESPIRATORY (INHALATION) at 20:16

## 2018-02-12 RX ADMIN — IPRATROPIUM BROMIDE AND ALBUTEROL SULFATE SCH: .5; 3 SOLUTION RESPIRATORY (INHALATION) at 20:18

## 2018-02-12 RX ADMIN — IPRATROPIUM BROMIDE AND ALBUTEROL SULFATE SCH: .5; 3 SOLUTION RESPIRATORY (INHALATION) at 11:58

## 2018-02-12 RX ADMIN — MEROPENEM SCH MLS/HR: 1 INJECTION, POWDER, FOR SOLUTION INTRAVENOUS at 08:33

## 2018-02-12 NOTE — ECHOF
Referral Reason:Chest pain and cardiomyopathy



MEASUREMENTS

--------

HEIGHT: 165.1 cm

WEIGHT: 133.4 kg

BP: 

RVIDd:   3.0 cm     (< 3.3)

IVSd:   1.1 cm     (0.6 - 1.1)

LVIDd:   4.3 cm     (3.9 - 5.3)

LVPWd:   0.9 cm     (0.6 - 1.1)

IVSs:   2.0 cm

LVIDs:   2.2 cm

LVPWs:   1.6 cm

LAESV Index (A-L):   35.59 ml/m

Ao Diam:   2.7 cm     (2.0 - 3.7)

AV Cusp:   1.7 cm     (1.5 - 2.6)

LA Diam:   3.7 cm     (2.7 - 3.8)

MV EXCURSION:   13.536 mm     (> 18.000)

MV EF SLOPE:   144 mm/s     (70 - 150)

EPSS:   0.5 cm

MV E Mert:   1.31 m/s

MV DecT:   195 ms

MV A Mert:   1.41 m/s

MV E/A Ratio:   0.93 

RAP:   15.00 mmHg

RVSP:   39.39 mmHg







FINDINGS

--------

Sinus rhythm.

This was a technically good study.

The left ventricular size is normal.   Left ventricular wall thickness is normal.   Overall left vent
ricular systolic function is normal with, an EF between 55 - 60 %.

The right ventricle is normal in size.

LA is midly dilated 29-33ml/m2.

The right atrium is normal in size.

There is mild aortic valve sclerosis.

Mild mitral annular calcification present.   Mild mitral regurgitation is present.

Mild tricuspid regurgitation present.   There is mild pulmonary hypertension.   The right ventricular
 systolic pressure, as measured by Doppler, is 39.39mmHg.

Trace/mild (physiologic)  pulmonic regurgitation.

The aortic root size is normal.

The inferior vena cava is mildly dilated.

There is no pericardial effusion.



CONCLUSIONS

--------

1. Sinus rhythm.

2. This was a technically good study.

3. The left ventricular size is normal.

4. Left ventricular wall thickness is normal.

5. Overall left ventricular systolic function is normal with, an EF between 55 - 60 %.

6. LA is midly dilated 29-33ml/m2.

7. There is mild aortic valve sclerosis.

8. Mild mitral annular calcification present.

9. Mild mitral regurgitation is present.

10. Mild tricuspid regurgitation present.

11. There is mild pulmonary hypertension.

12. Trace/mild (physiologic)  pulmonic regurgitation.

13. The aortic root size is normal.

14. The inferior vena cava is mildly dilated.

15. There is no pericardial effusion.





SONOGRAPHER: Cher Hastings RDCS

## 2018-02-12 NOTE — P.PN
Subjective


Progress Note Date: 02/12/18


This patient with history of significant pulmonary disease is admitted with 

complaints of shortness of breath and problems with oxygenation.  Chest x-ray 

showed evidence of infiltrates and questionable CHF.  Her proBNP is elevated.  

Patient is feeling slightly better.  Still has significant wheezing and 

rhonchi.  I'm going to get an echocardiogram to assess LV function.  We'll also 

increase the dose of the diuretics.  Pulmonary management as per pulmonologist








Objective





- Vital Signs


Vital signs: 


 Vital Signs











Temp  97.8 F   02/12/18 07:00


 


Pulse  83   02/12/18 08:39


 


Resp  20   02/12/18 07:00


 


BP  109/82   02/12/18 07:00


 


Pulse Ox  98   02/12/18 09:53








 Intake & Output











 02/11/18 02/12/18 02/12/18





 18:59 06:59 18:59


 


Intake Total 50 620 


 


Balance 50 620 


 


Weight 133.7 kg  


 


Intake:   


 


  IV  620 


 


    Meropenem 1 gm In Sodium  400 





    Chloride 0.9% 100 ml @   





    200 mls/hr IVPB Q8HR NED   





    Rx#:[id number]   


 


    Sodium Chloride 0.9% 1,  220 





    000 ml @ 20 mls/hr IV .   





    Q24H NED Rx#:[id number]   


 


  Intake, IV Titration 50  





  Amount   


 


    Piperacillin-Tazobactam 3 50  





    .375 gm In Dextrose/Water   





    1 50ml.bag @ 12.5 mls/hr   





    IVPB Q8HR NED Rx#:   





    [id number]   


 


Other:   


 


  Voiding Method Bedpan Bedpan Bedpan





 Incontinent  


 


  # Voids 1 3 


 


  # Bowel Movements  1 














- Exam





GENERAL EXAM: Patient is alert and oriented and doesn't appear to be in any 

acute distress


HEENT: Normocephalic. Normal reaction of pupils, equal size, normal range of 

extraocular motion. No erythema or exudates in the throat.


NECK: No masses, no nuchal rigidity.


CHEST: No chest wall deformity.


LUNGS: Expiratory wheezes and rhonchi


HEART: S1 and S2 normal with no audible mumurs or gallops. Regular rhythm, 

femorals equal on both sides..


ABDOMEN: No hepatosplenomegaly, normal bowel sounds, no guarding or rigidity.


SKIN: No rashes


CENTRAL NERVOUS SYSTEM: No focal deficits.


EXTREMITIES: Mild edema





- Labs


CBC & Chem 7: 


 02/11/18 08:00





 02/11/18 08:00


Labs: 


 Abnormal Lab Results - Last 24 Hours (Table)











  02/11/18 02/11/18 02/11/18 Range/Units





  08:00 11:26 14:01 


 


POC Glucose (mg/dL)   116 H   (75-99)  mg/dL


 


Troponin I    0.147 H*  (0.000-0.034)  ng/mL


 


Triglycerides  167 H    (<150)  mg/dL














  02/11/18 02/11/18 02/12/18 Range/Units





  17:13 20:05 08:26 


 


POC Glucose (mg/dL)  259 H   338 H  (75-99)  mg/dL


 


Troponin I   0.107 H*   (0.000-0.034)  ng/mL


 


Triglycerides     (<150)  mg/dL








 Microbiology - Last 24 Hours (Table)











 02/11/18 08:00 Blood Culture - Preliminary





 Blood    No Growth after 24 hours














Assessment and Plan


(1) Elevated troponin


Current Visit: Yes   Status: Acute   Code(s): R74.8 - ABNORMAL LEVELS OF OTHER 

SERUM ENZYMES   SNOMED Code(s): 442324196


   





(2) Acute exacerbation of chronic obstructive airways disease


Current Visit: Yes   Status: Acute   Code(s): J44.1 - CHRONIC OBSTRUCTIVE 

PULMONARY DISEASE W (ACUTE) EXACERBATION   SNOMED Code(s): 626424657


   





(3) Bronchiolitis obliterans


Current Visit: Yes   Status: Acute   Code(s): J42 - UNSPECIFIED CHRONIC 

BRONCHITIS   SNOMED Code(s): 00029889


   





(4) Pneumonia


Current Visit: Yes   Status: Acute   Code(s): J18.9 - PNEUMONIA, UNSPECIFIED 

ORGANISM   SNOMED Code(s): 660801172


   





(5) Renal insufficiency


Current Visit: Yes   Status: Acute   Code(s): N28.9 - DISORDER OF KIDNEY AND 

URETER, UNSPECIFIED   SNOMED Code(s): 606505150


   


Plan: 


Patient is mainly admitted for issues with oxygen saturation and ongoing 

pulmonary issues.  Troponin values are mildly elevated.  We're going to 

continue to monitor the pattern of troponin elevation with subsequent draws.  

Echo will be done.  Further recommendations depend upon clinical course.





02/12/2018:.  Patient seemed to be more stable.  Her pro BNP is elevated.  We 

are going to get an echocardiogram.  Meanwhile, I'll increase the dose of the 

diuretics

## 2018-02-12 NOTE — P.PN
Progress Note - Text


Progress Note Date: 02/12/18


DATE OF SERVICE: 


02/12/2018





PRESENTING COMPLAINT:


Increasing shortness of breath and wheezing





HISTORY OF PRESENT ILLNESS:


72-year-old female was recently discharged from this facility to [facility] on 02/09/2018 after having multilevel bilateral pneumonia 

with sepsis and delirium.  Was returned from [facility] after experiencing increasing 

difficulty breathing using accessory muscles found to be hot and patient 

reporting that she was wheezing more and developed a cough.  Admitted for the 

same.





INTERVAL HISTORY:


02/12/2018:


Patient sitting up in bed appears comfortable.  Very perky.  No apparent or 

obvious shortness of breath.  Has transient cough does not sound congested in 

any way.  No nasal drainage no sputum production.  Has oxygen on at 3 L nasal 

cannula this is her baseline.  Tolerating her diet ambulatory and 7500% of her 

meals.  Up to the chair with assistance.  Last BM 02/12/2018.





REVIEW OF SYSTEMS:


Done for constitutional ,cardiovascular, GI, pulmonary with relevant findings 

as above.





CURRENT MEDICATIONS


Tylenol, DuoNeb, aspirin, Pulmicort, Symbicort, cholecalciferol, ferrous sulfate

, furosemide, insulin sliding scale, Levemir, Lantana process, melatonin, 

meropenem, Bactroban ointment, Nitrostat, Thom 10, prednisone, Requip, Xarelto

, Silvadene cream.





PHYSICAL EXAM


VITAL SIGNS: 


Temperature 97.8, pulse 80, respiratory rate 20, blood pressure 109/82, oxygen 

saturation 98% on 4 L.





GENERAL APPEARANCE:  Lying in bed, not in distress. 


HENT: Normocephalic, JVD not raised. Mass not palpable.  Oral cavity normal, 

external appearance of ears and nose normal.


EYES:Pupils equal. Conjunctiva normal.


RESPIRATORY: Respiratory effort mildly increased Lungs diminished to 

auscultation. 


CARDIOVASCULAR: First and second sounds normal. No edema. 


ABDOMEN: Soft. Liver and spleen not palpable. No tenderness. No mass palpable. 


PSYCHIATRY: Alert and oriented x3. Mood and affect normal. 


INTEGUMENT: Chronic lymphedema.  Bilateral lower extremities with wounds 

healing well.  No evidence of infection or cellulitis.





INVESTIGATIONS:


LABS: Accu-Cheks noted.





ASSESSMENT:


-Acute bilateral pneumonia with possibly worsening infiltrate and possible 

sepsis.  On admission with worsening white count, patient being febrile and 

symptomatic.


-Chronic sigmoid diverticulosis.


-Chronic hypoxic respiratory failure on 3 L of oxygen at home from underlying 

chronic obstructive pulmonary disease.


-Acute chronic obstructive pulmonary disease exacerbation and an ex-smoker.


-Obesity hypoventilation syndrome.


-Cor pulmonale from above conditions.


-Diabetes mellitus type 2, chronically on insulin.


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


-Chronic low back pain, spinal stenosis.


-Chronic bilateral lower extremity lymphedema.


-Chronic urinary stress incontinence.


-Gait dysfunction uses walker.


-Chronic lower extremity cellulitis.


-Chronic versus leg syndrome.


-History of splenectomy.





PLAN:


Continue patient on meropenem and nebulized bronchodilators as well as inhaled 

steroids and increase prednisone to 40 mg.  Patient is stable for transfer to 

[facility] unit.  With discharge planning in the next 24-48 hours.  Plan of care 

discussed with patient bedside she is in agreement we will follow closely.





NP statement: Patient was seen and examined by nurse practitioner Priscilla Martinez and all elements of the case discussed with attending  Dr. Villa

## 2018-02-12 NOTE — P.PN
Subjective


Progress Note Date: 02/12/18


Principal diagnosis: 


Care on chronic hypoxic respiratory failure due to acute exacerbation of COPD, 

bronchiolitis obliterans, mild CHF, and pneumonia





This is a 72-year-old female well-known to me.  She's been in and out of the 

hospital recently.  She's here in the hospital gets discharged to [facility] on 

the [facility], discharged home,  back to the hospital, etc.  She was 

recently in between February 6 and February 9 with an episode of mild fluid 

overload and possible pneumonia involving the upper lobes.  She does have a 

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 

biopsy.  In addition  on that same biopsy, she was found to have significant 

microcirculatory pulmonary arterial thrombus.  For that reason, she was placed 

on both prednisone for the bronchiolitis obliterans organizing pneumonia and a 

factor X a inhibitor for the thrombi.  She been doing relatively well.  The 

prednisone was causing her side effects in the way of weight gain and fluid 

retention and the prednisone was weaned down to a smaller dose of 10 mg a day.  

She's been on the blood thinner for some time.  More recently, she was admitted 

with a diagnosis again of COPD exacerbation.  She had an abnormal chest x-ray 

which suggested possible upper lobe pneumonia.  Also, the same chest x-ray 

suggested possible interstitial edema.  BNP was mildly elevated.  She was 

treated between the sixth and ninth of February discharged on the ninth and 

came back into the emergency room this morning with complaints of increasing 

shortness of breath.  Actually, the reason for coming into the hospital was 

that her saturations there were in the mid to high 80s.  This is about normal 

for her.  For that reason EMS was called and she was transferred back in.  She 

had no new complaints. Not coughing more than usual not producing any phlegm.  

No fever no chills.  No nausea vomiting or diarrhea.  In addition, the patient 

did not have any chest pain or chest discomfort.  She was admitted to the ICU 

as a 6 selective overflow.  No beds on 6 selective.  I suspect she does not 

have myocardial ischemia and likely the elevated troponins related to supply 

demand mismatch and mild troponin leak.





On 02/12/2018 patient seen again in follow-up.  She remains in the intensive 

care is a general medical surgical overflow.  She is mildly short of breath at 

rest, her FiO2 is at 4 L per nasal cannula, with a pulse ox of 94%.  She is 

afebrile, vital signs are stable.  Chest x-ray from 02/11/2018 has been reviewed

, and showed improving interstitial change, reflecting improving interstitial 

pneumonia or congestive heart failure this was compared to the previous study 

from 02/09/2018.  There were continuing bilateral areas of consolidation.  

Blood culture has been ordered and sent, results are pending at this time.  

Patient's lung sounds are positive for diffuse wheezes, and some crackles at 

the left posterior lower base.  Patient continues on her home meds, home dose 

oral Lasix, nebulized treatments, meropenem, Xarelto, Symbicort, and her home 

dose prednisone.  She denies any chest pain, cardiology is following the 

patient regarding her elevated troponins.  No new labs today.  No acute events 

overnight, no chest congestion or significant sputum production. 








Objective





- Vital Signs


Vital signs: 


 Vital Signs











Temp  98.3 F   02/11/18 20:00


 


Pulse  83   02/12/18 08:39


 


Resp  22   02/12/18 00:00


 


BP  117/62   02/11/18 20:00


 


Pulse Ox  94 L  02/11/18 20:00








 Intake & Output











 02/11/18 02/12/18 02/12/18





 18:59 06:59 18:59


 


Intake Total 50 620 


 


Balance 50 620 


 


Weight 133.7 kg  


 


Intake:   


 


  IV  620 


 


    Meropenem 1 gm In Sodium  400 





    Chloride 0.9% 100 ml @   





    200 mls/hr IVPB Q8HR NED   





    Rx#:[id number]   


 


    Sodium Chloride 0.9% 1,  220 





    000 ml @ 20 mls/hr IV .   





    Q24H NED Rx#:[id number]   


 


  Intake, IV Titration 50  





  Amount   


 


    Piperacillin-Tazobactam 3 50  





    .375 gm In Dextrose/Water   





    1 50ml.bag @ 12.5 mls/hr   





    IVPB Q8HR NED Rx#:   





    [id number]   


 


Other:   


 


  Voiding Method Bedpan Bedpan 





 Incontinent  


 


  # Voids 1 3 


 


  # Bowel Movements  1 














- Exam


No acute distress, oriented 3.  Nasal O2 in place at 4 L/m, which is her 

baseline





HEENT examination is grossly unremarkable.  Mucous membranes are moist.  No 

oral lesions.





Neck supple.  Full range of motion.  No adenopathy thyromegaly or neck vein 

distention.





Cardiovascular examination reveals regular rhythm rate.  S1-S2 normal.  No S3 

or S4.  No discernible murmur noted.





Lungs reveal a few scattered rhonchi.  No crackles.  Scattered wheezes.  Breath 

sounds are equal bilaterally.  She sounds pretty much at baseline.





Abdomen soft bowel sounds are heard.  No masses or tenderness.  Abdomen is 

obese.





Extremities are intact.  There is chronic venous stasis changes and some mild 

lower summary edema.  Actually her legs look better than they left on her last 

admission..





Skin is without rash or lesion.





Neurologic examination is brief but nonfocal.








- Labs


CBC & Chem 7: 


 02/11/18 08:00





 02/11/18 08:00


Labs: 


 Abnormal Lab Results - Last 24 Hours (Table)











  02/11/18 02/11/18 02/11/18 Range/Units





  08:00 11:26 14:01 


 


POC Glucose (mg/dL)   116 H   (75-99)  mg/dL


 


Troponin I    0.147 H*  (0.000-0.034)  ng/mL


 


Triglycerides  167 H    (<150)  mg/dL














  02/11/18 02/11/18 02/12/18 Range/Units





  17:13 20:05 08:26 


 


POC Glucose (mg/dL)  259 H   338 H  (75-99)  mg/dL


 


Troponin I   0.107 H*   (0.000-0.034)  ng/mL


 


Triglycerides     (<150)  mg/dL














Assessment and Plan


Plan: 


Assessment:





1. Acute on chronic hypoxic respiratory failure secondary to mild CHF, 

unspecified, COPD exacerbation, and bilateral pneumonia.  Patient has a history 

of bronchiolitis obliterans organizing pneumonia, and microcirculatory 

pulmonary arterial thrombus disease.  The patient's pretty much at baseline and 

looks about the same as she did on her last admission between February 6 and 

February 9.  I believe she was sent in because of her saturations in the mid to 

high 80s which is pretty much her baseline.





2. Recent admission for COPD exacerbation complicated by purulent 

tracheobronchitis/bronchopneumonia involving the upper lobes





3. Biopsy-proven bronchiolitis obliterans organizing pneumonia





4. Microcirculatory pulmonary arteriopathy/thrombi





5. Obesity





6. Uterine cancer





7. Diabetes mellitus





8. Gastroesophageal reflux disease





9. Hypertension





10. Hyperlipidemia





11. Restless leg syndrome





12. Migraine cephalgia





13. Status post VATS lung biopsy


Plan: 





Plan dated 02/11/2018





Chest x-rays reviewed.  Is actually better than it was on her last admission.  

The patient's medications and labs will also be reviewed.  We'll get her back 

on her usual medications.  This will include a small dose of prednisone and her 

factor X a inhibitor.  In addition, we'll get her back on oral antibiotics.  

Cardiology will see her.  If they agree that the troponins likely related to 

supply demand mismatch and not ischemia, she could be transferred down to the 

general medical floor.  Additional recommendations and suggestions are 

forthcoming.  Prognosis is guarded.





Plan dated 02/12/2018





No new labs or chest x-rays.  Clinically patient denies any acute distress.  

Lung sounds are positive for some scattered wheezes and some rales at the left 

posterior lower base.  Continue with current medical treatment, continue with 

Symbicort, DuoNeb nebulized treatments, continue meropenem, collect sputum 

culture, will await the results of the final blood culture.  No febrile 

episodes since admission.  Denies any chest pain.  From pulmonary/critical care 

standpoint patient is stable for transfer to general [facility] with telemetry.  

Check with cardiology if they would actually like the patient to transfer to 

[facility] care instead. 





I performed a history & physical examination of the patient and discussed their 

management with my nurse practitioner, Corinne Garcia.  I reviewed the nurse 

practitioner's note and agree with the documented findings and plan of care.  

Lung sounds are positive for diffuse wheezes throughout the lung fields.  The 

findings and the impression was discussed with the patient.  I attest to the 

documentation by the nurse practitioner. 





Time with Patient: Less than 30

## 2018-02-13 LAB
ANION GAP SERPL CALC-SCNC: 10 MMOL/L
BUN SERPL-SCNC: 38 MG/DL (ref 7–17)
CALCIUM SPEC-MCNC: 9 MG/DL (ref 8.4–10.2)
CELLS COUNTED: 200
CHLORIDE SERPL-SCNC: 98 MMOL/L (ref 98–107)
CO2 SERPL-SCNC: 34 MMOL/L (ref 22–30)
EOSINOPHIL # BLD MANUAL: 0.4 K/UL (ref 0–0.7)
ERYTHROCYTE [DISTWIDTH] IN BLOOD BY AUTOMATED COUNT: 2.94 M/UL (ref 3.8–5.4)
ERYTHROCYTE [DISTWIDTH] IN BLOOD: 20.1 % (ref 11.5–15.5)
GLUCOSE BLD-MCNC: 148 MG/DL (ref 75–99)
GLUCOSE BLD-MCNC: 177 MG/DL (ref 75–99)
GLUCOSE BLD-MCNC: 214 MG/DL (ref 75–99)
GLUCOSE BLD-MCNC: 239 MG/DL (ref 75–99)
GLUCOSE SERPL-MCNC: 165 MG/DL (ref 74–99)
HBA1C MFR BLD: 7.3 % (ref 4–6)
HCT VFR BLD AUTO: 28.8 % (ref 34–46)
HGB BLD-MCNC: 8.2 GM/DL (ref 11.4–16)
LYMPHOCYTES # BLD MANUAL: 1.61 K/UL (ref 1–4.8)
MCH RBC QN AUTO: 28 PG (ref 25–35)
MCHC RBC AUTO-ENTMCNC: 28.6 G/DL (ref 31–37)
MCV RBC AUTO: 98 FL (ref 80–100)
MONOCYTES # BLD MANUAL: 0.67 K/UL (ref 0–1)
NEUTROPHILS NFR BLD MANUAL: 80 %
NEUTS SEG # BLD MANUAL: 10.72 K/UL (ref 1.3–7.7)
PLATELET # BLD AUTO: 326 K/UL (ref 150–450)
POTASSIUM SERPL-SCNC: 4.2 MMOL/L (ref 3.5–5.1)
SODIUM SERPL-SCNC: 142 MMOL/L (ref 137–145)
WBC # BLD AUTO: 13.4 K/UL (ref 3.8–10.6)

## 2018-02-13 RX ADMIN — INSULIN DETEMIR SCH UNIT: 100 INJECTION, SOLUTION SUBCUTANEOUS at 21:46

## 2018-02-13 RX ADMIN — INSULIN ASPART SCH UNIT: 100 INJECTION, SOLUTION INTRAVENOUS; SUBCUTANEOUS at 07:57

## 2018-02-13 RX ADMIN — FUROSEMIDE SCH MG: 40 TABLET ORAL at 15:08

## 2018-02-13 RX ADMIN — CEFUROXIME AXETIL SCH MG: 250 TABLET ORAL at 11:36

## 2018-02-13 RX ADMIN — IPRATROPIUM BROMIDE AND ALBUTEROL SULFATE SCH: .5; 3 SOLUTION RESPIRATORY (INHALATION) at 19:19

## 2018-02-13 RX ADMIN — Medication SCH MG: at 21:17

## 2018-02-13 RX ADMIN — POTASSIUM CHLORIDE SCH MEQ: 750 TABLET, EXTENDED RELEASE ORAL at 07:59

## 2018-02-13 RX ADMIN — IPRATROPIUM BROMIDE AND ALBUTEROL SULFATE SCH: .5; 3 SOLUTION RESPIRATORY (INHALATION) at 15:23

## 2018-02-13 RX ADMIN — MUPIROCIN SCH APPLIC: 20 OINTMENT TOPICAL at 07:59

## 2018-02-13 RX ADMIN — BUDESONIDE SCH MG: 1 SUSPENSION RESPIRATORY (INHALATION) at 21:44

## 2018-02-13 RX ADMIN — IPRATROPIUM BROMIDE AND ALBUTEROL SULFATE SCH ML: .5; 3 SOLUTION RESPIRATORY (INHALATION) at 10:58

## 2018-02-13 RX ADMIN — ASPIRIN 325 MG ORAL TABLET SCH MG: 325 PILL ORAL at 07:58

## 2018-02-13 RX ADMIN — IPRATROPIUM BROMIDE AND ALBUTEROL SULFATE SCH ML: .5; 3 SOLUTION RESPIRATORY (INHALATION) at 07:00

## 2018-02-13 RX ADMIN — FUROSEMIDE SCH MG: 40 TABLET ORAL at 07:59

## 2018-02-13 RX ADMIN — CEFUROXIME AXETIL SCH MG: 250 TABLET ORAL at 21:21

## 2018-02-13 RX ADMIN — BUDESONIDE AND FORMOTEROL FUMARATE DIHYDRATE SCH: 160; 4.5 AEROSOL RESPIRATORY (INHALATION) at 19:19

## 2018-02-13 RX ADMIN — MUPIROCIN SCH APPLIC: 20 OINTMENT TOPICAL at 21:22

## 2018-02-13 RX ADMIN — RIVAROXABAN SCH MG: 10 TABLET, FILM COATED ORAL at 07:59

## 2018-02-13 RX ADMIN — LATANOPROST SCH DROPS: 50 SOLUTION OPHTHALMIC at 21:21

## 2018-02-13 RX ADMIN — BUDESONIDE SCH MG: 1 SUSPENSION RESPIRATORY (INHALATION) at 07:00

## 2018-02-13 RX ADMIN — INSULIN ASPART SCH UNIT: 100 INJECTION, SOLUTION INTRAVENOUS; SUBCUTANEOUS at 21:17

## 2018-02-13 RX ADMIN — IPRATROPIUM BROMIDE AND ALBUTEROL SULFATE PRN ML: .5; 3 SOLUTION RESPIRATORY (INHALATION) at 02:57

## 2018-02-13 RX ADMIN — Medication SCH UNIT: at 21:21

## 2018-02-13 RX ADMIN — Medication SCH MG: at 07:58

## 2018-02-13 RX ADMIN — INSULIN ASPART SCH UNIT: 100 INJECTION, SOLUTION INTRAVENOUS; SUBCUTANEOUS at 18:24

## 2018-02-13 RX ADMIN — IPRATROPIUM BROMIDE AND ALBUTEROL SULFATE SCH ML: .5; 3 SOLUTION RESPIRATORY (INHALATION) at 21:44

## 2018-02-13 RX ADMIN — Medication SCH MG: at 21:21

## 2018-02-13 RX ADMIN — INSULIN ASPART SCH UNIT: 100 INJECTION, SOLUTION INTRAVENOUS; SUBCUTANEOUS at 13:30

## 2018-02-13 RX ADMIN — INSULIN ASPART SCH UNIT: 100 INJECTION, SOLUTION INTRAVENOUS; SUBCUTANEOUS at 13:31

## 2018-02-13 RX ADMIN — MEROPENEM SCH MLS/HR: 1 INJECTION, POWDER, FOR SOLUTION INTRAVENOUS at 07:58

## 2018-02-13 RX ADMIN — BUDESONIDE SCH: 1 SUSPENSION RESPIRATORY (INHALATION) at 19:19

## 2018-02-13 RX ADMIN — MUPIROCIN SCH APPLIC: 20 OINTMENT TOPICAL at 15:09

## 2018-02-13 RX ADMIN — BUDESONIDE AND FORMOTEROL FUMARATE DIHYDRATE SCH: 160; 4.5 AEROSOL RESPIRATORY (INHALATION) at 07:00

## 2018-02-13 NOTE — DS
DISCHARGE SUMMARY



DATE OF ADMISSION:

02/11/2018.



DATE OF DISCHARGE:

02/13/2018



FINAL DIAGNOSES:

1. Acute bilateral pneumonia, suspect gram-negative organism causing sepsis present on

    admission.

2. Chronic sigmoid diverticulosis.

3. Chronic hypoxic respiratory failure on 3 L oxygen at home from underlying chronic

    obstructive pulmonary disease.

4. Acute hypoxic respiratory failure from pneumonia.

5. Acute chronic obstructive pulmonary disease exacerbation in an ex-smoker.

6. Obesity hypoventilation syndrome.

7. Cor pulmonale from above condition.

8. Diabetes mellitus type 2, chronically on insulin.

9. Biopsy confirmed thromboembolic lung disease for which patient chronically on

    Xarelto.

10.Chronic low back pain from spinal stenosis.

11.Chronic bilateral lower extremity lymphedema.

12.Chronic urinary stress incontinence.

13.Gait dysfunction, uses a walker.

14.Chronic lower extremity cellulitis.

15.Chronic restless leg syndrome.

16.History of splenectomy.

17.Colonic diverticulosis.

18.CODE STATUS:  DO NOT RESUSCITATE.



HOSPITAL COURSE:

This is a patient who was just discharged from the hospital, admitted again with a

sepsis picture with bilateral pneumonia, hypoxia.  Patient is put on IV Zosyn and IV

meropenem to which she responded.  Patient normally is on 3 L of oxygen.  Oxygen

requirement was much more.  Patient at the present time is being discharged on 4 L of

oxygen.  Currently patient is doing better, tolerating a diet, communicating.



PHYSICAL EXAM:

LUNGS: Decreased breath sounds.  Minimal wheezing.  Patient has got chronic lymphedema

and chronic cellulitis in the lower extremity.



CONSULTATION:

1. Dr. Chino from Pulmonary.

2. Dr. Bowen from Cardiology.



DISCHARGE MEDICATIONS:

1. Ventolin 2 puffs q.4 p.r.n.

2. Tudorza 1 puff b.i.d.

3. Xalatan 0.005% 1 drop to both eyes q.h.s.

4. Xarelto 20 mg p.o. daily.

5. Iron 325 p.o. b.i.d.

6. Melatonin 5 mg q.h.s.

7. K-Dur 10 mEq p.o. daily.

8. Requip 0.25 mg b.i.d.

9. Breo Ellipta 100/25 one puff daily.

10.Humalog 5 units a.c. t.i.d.

11.Insulin Lantus 15 units subcu q.h.s.

12.Tylenol 650 mg q.4 p.r.n.

13.Prednisone 10 mg a day.

14.Vitamin D3 one thousand units p.o. q.h.s.

15.Silvadene cream topical q.h.s.

16.Requip 0.5 mg q.h.s.

17.Pulmicort 1 mg nebulizer b.i.d.

18.Ceftin 500 mg p.o. b.i.d.

19.Lasix 40 mg b.i.d.

20.DuoNeb q.6.

21.Bactroban 2% topical t.i.d.

22.Nitrostat 0.4 sublingual q.5 p.r.n.

23.Oxygen 4 L at the present time.



DISPOSITION:

[facility].



Follow up with Dr. Hernández on 2/14/2018.  Follow up with Dr. Chino at the [facility].



Discussion and discharge planning more than 35 minutes.





MMODL / IJN: [id number] / Job#: [id number]

## 2018-02-13 NOTE — PN
PROGRESS NOTE



Mrs. Elizadle is a 72-year-old female who has a history of chronic lung disease,

bronchiolitis obliterans, who presented with symptoms of progressive dyspnea.  She had

minimal elevation of troponin, thought to be a type 2 event.  She continues to cough

today, although she feels better overall.  Her breathing is stable.  She denies any

dizziness or palpitation.  She denies any nausea.  She has evidence of chronic

peripheral edema.  She had an echocardiogram that revealed a preserved ventricular size

and systolic function.



She continues to be at this time on aspirin, Symbicort, Ceftin, iron, and Lasix 40 mg

twice a day, insulin. DuoNeb, potassium, Xarelto 20 mg daily.  Ropinirole.



PHYSICAL EXAMINATION:

Blood pressure 127/60 with a heart rate in the 90s.

LUNGS:  With decreased air exchange, scattered rhonchi.

HEART: Regular rate and rhythm, S1, S2.  No S3.  No rub.

ABDOMEN:  Soft, obese, nontender.

EXTREMITIES:  With chronic changes.



IMPRESSION:

1. Symptoms of dyspnea related to her lung status.

2. Minimal elevation of her troponin with no evidence of acute ischemic event.

3. Obesity.



RECOMMENDATION:

From the cardiac standpoint, will continue current therapy.  I will stop her aspirin

since she is on Xarelto. From the cardiac standpoint, she is stable to be transferred

back to the F.





CYNTHIA / MADELEINE: [id number] / Job#: [id number]

## 2018-02-13 NOTE — PN
PROGRESS NOTE



DATE OF SERVICE:

02/12/2018



ATTENDING NOTE:

Patient was seen and examined by me.  I discussed with nurse practitioner, Ms. Martinez.

Patient went over to the ICU, was admitted with pneumonia and COPD exacerbation.

Breathing is better.  Tolerating a diet.



PHYSICAL EXAM:

LUNGS: ; Improved air entry, minimal wheezing.

PSYCH: Alert and oriented x3.



ASSESSMENT:

1. Bilateral pneumonia, sepsis, present on admission.

2. Acute chronic obstructive pulmonary disease exacerbation.

3. A 2D echocardiogram shows preserved left ventricular function.



PLAN:

Continue current medication and treatment plan.  The patient is be moved out of the ICU

this morning.  Cardiology is following the troponins.  Check labs in the morning.





MMODL / IJN: [id number] / Job#: [id number]

## 2018-02-14 LAB
ANION GAP SERPL CALC-SCNC: 8 MMOL/L
BUN SERPL-SCNC: 42 MG/DL (ref 7–17)
CALCIUM SPEC-MCNC: 8.8 MG/DL (ref 8.4–10.2)
CELLS COUNTED: 200
CHLORIDE SERPL-SCNC: 93 MMOL/L (ref 98–107)
CO2 BLDA-SCNC: 45 MMOL/L (ref 19–24)
CO2 SERPL-SCNC: 41 MMOL/L (ref 22–30)
EOSINOPHIL # BLD MANUAL: 0.44 K/UL (ref 0–0.7)
ERYTHROCYTE [DISTWIDTH] IN BLOOD BY AUTOMATED COUNT: 2.94 M/UL (ref 3.8–5.4)
ERYTHROCYTE [DISTWIDTH] IN BLOOD: 20.2 % (ref 11.5–15.5)
GLUCOSE BLD-MCNC: 129 MG/DL (ref 75–99)
GLUCOSE BLD-MCNC: 134 MG/DL (ref 75–99)
GLUCOSE BLD-MCNC: 205 MG/DL (ref 75–99)
GLUCOSE BLD-MCNC: 295 MG/DL (ref 75–99)
GLUCOSE BLD-MCNC: 98 MG/DL (ref 75–99)
GLUCOSE SERPL-MCNC: 125 MG/DL (ref 74–99)
HCO3 BLDA-SCNC: 43 MMOL/L (ref 21–25)
HCT VFR BLD AUTO: 28.5 % (ref 34–46)
HGB BLD-MCNC: 8.1 GM/DL (ref 11.4–16)
LYMPHOCYTES # BLD MANUAL: 4.21 K/UL (ref 1–4.8)
MCH RBC QN AUTO: 27.6 PG (ref 25–35)
MCHC RBC AUTO-ENTMCNC: 28.5 G/DL (ref 31–37)
MCV RBC AUTO: 96.9 FL (ref 80–100)
MONOCYTES # BLD MANUAL: 1.31 K/UL (ref 0–1)
MYELOCYTES # BLD MANUAL: 0.15 K/UL
NEUTROPHILS NFR BLD MANUAL: 59 %
NEUTS SEG # BLD MANUAL: 8.56 K/UL (ref 1.3–7.7)
PCO2 BLDA: 54 MMHG (ref 35–45)
PH BLDA: 7.51 [PH] (ref 7.35–7.45)
PLATELET # BLD AUTO: 343 K/UL (ref 150–450)
PO2 BLDA: 45 MMHG (ref 83–108)
POTASSIUM SERPL-SCNC: 4.3 MMOL/L (ref 3.5–5.1)
SODIUM SERPL-SCNC: 142 MMOL/L (ref 137–145)
WBC # BLD AUTO: 14.5 K/UL (ref 3.8–10.6)

## 2018-02-14 PROCEDURE — 5A09457 ASSISTANCE WITH RESPIRATORY VENTILATION, 24-96 CONSECUTIVE HOURS, CONTINUOUS POSITIVE AIRWAY PRESSURE: ICD-10-PCS

## 2018-02-14 RX ADMIN — FUROSEMIDE SCH MG: 40 TABLET ORAL at 17:45

## 2018-02-14 RX ADMIN — CEFUROXIME AXETIL SCH MG: 250 TABLET ORAL at 13:23

## 2018-02-14 RX ADMIN — BUDESONIDE AND FORMOTEROL FUMARATE DIHYDRATE SCH: 160; 4.5 AEROSOL RESPIRATORY (INHALATION) at 21:15

## 2018-02-14 RX ADMIN — IPRATROPIUM BROMIDE AND ALBUTEROL SULFATE SCH: .5; 3 SOLUTION RESPIRATORY (INHALATION) at 11:16

## 2018-02-14 RX ADMIN — RIVAROXABAN SCH MG: 10 TABLET, FILM COATED ORAL at 13:24

## 2018-02-14 RX ADMIN — INSULIN ASPART SCH: 100 INJECTION, SOLUTION INTRAVENOUS; SUBCUTANEOUS at 13:00

## 2018-02-14 RX ADMIN — Medication SCH UNIT: at 22:23

## 2018-02-14 RX ADMIN — LATANOPROST SCH DROPS: 50 SOLUTION OPHTHALMIC at 22:23

## 2018-02-14 RX ADMIN — IPRATROPIUM BROMIDE AND ALBUTEROL SULFATE SCH: .5; 3 SOLUTION RESPIRATORY (INHALATION) at 11:50

## 2018-02-14 RX ADMIN — INSULIN DETEMIR SCH UNIT: 100 INJECTION, SOLUTION SUBCUTANEOUS at 22:27

## 2018-02-14 RX ADMIN — INSULIN ASPART SCH: 100 INJECTION, SOLUTION INTRAVENOUS; SUBCUTANEOUS at 08:00

## 2018-02-14 RX ADMIN — IPRATROPIUM BROMIDE AND ALBUTEROL SULFATE SCH ML: .5; 3 SOLUTION RESPIRATORY (INHALATION) at 21:15

## 2018-02-14 RX ADMIN — FUROSEMIDE SCH MG: 40 TABLET ORAL at 13:23

## 2018-02-14 RX ADMIN — Medication SCH MG: at 22:23

## 2018-02-14 RX ADMIN — CEFUROXIME AXETIL SCH: 250 TABLET ORAL at 08:59

## 2018-02-14 RX ADMIN — MUPIROCIN SCH APPLIC: 20 OINTMENT TOPICAL at 22:28

## 2018-02-14 RX ADMIN — RIVAROXABAN SCH: 10 TABLET, FILM COATED ORAL at 09:01

## 2018-02-14 RX ADMIN — POTASSIUM CHLORIDE SCH: 750 TABLET, EXTENDED RELEASE ORAL at 09:00

## 2018-02-14 RX ADMIN — BUDESONIDE AND FORMOTEROL FUMARATE DIHYDRATE SCH: 160; 4.5 AEROSOL RESPIRATORY (INHALATION) at 11:18

## 2018-02-14 RX ADMIN — FUROSEMIDE SCH: 40 TABLET ORAL at 09:00

## 2018-02-14 RX ADMIN — INSULIN ASPART SCH: 100 INJECTION, SOLUTION INTRAVENOUS; SUBCUTANEOUS at 07:25

## 2018-02-14 RX ADMIN — BUDESONIDE SCH: 1 SUSPENSION RESPIRATORY (INHALATION) at 11:18

## 2018-02-14 RX ADMIN — MUPIROCIN SCH APPLIC: 20 OINTMENT TOPICAL at 17:46

## 2018-02-14 RX ADMIN — Medication SCH: at 09:00

## 2018-02-14 RX ADMIN — INSULIN ASPART SCH UNIT: 100 INJECTION, SOLUTION INTRAVENOUS; SUBCUTANEOUS at 17:45

## 2018-02-14 RX ADMIN — POTASSIUM CHLORIDE SCH MEQ: 750 TABLET, EXTENDED RELEASE ORAL at 13:24

## 2018-02-14 RX ADMIN — INSULIN ASPART SCH UNIT: 100 INJECTION, SOLUTION INTRAVENOUS; SUBCUTANEOUS at 22:27

## 2018-02-14 RX ADMIN — MUPIROCIN SCH APPLIC: 20 OINTMENT TOPICAL at 09:03

## 2018-02-14 RX ADMIN — IPRATROPIUM BROMIDE AND ALBUTEROL SULFATE SCH ML: .5; 3 SOLUTION RESPIRATORY (INHALATION) at 17:34

## 2018-02-14 RX ADMIN — INSULIN ASPART SCH: 100 INJECTION, SOLUTION INTRAVENOUS; SUBCUTANEOUS at 12:31

## 2018-02-14 RX ADMIN — INSULIN ASPART SCH UNIT: 100 INJECTION, SOLUTION INTRAVENOUS; SUBCUTANEOUS at 17:46

## 2018-02-14 RX ADMIN — BUDESONIDE AND FORMOTEROL FUMARATE DIHYDRATE SCH: 160; 4.5 AEROSOL RESPIRATORY (INHALATION) at 11:16

## 2018-02-14 RX ADMIN — IPRATROPIUM BROMIDE AND ALBUTEROL SULFATE PRN ML: .5; 3 SOLUTION RESPIRATORY (INHALATION) at 05:54

## 2018-02-14 RX ADMIN — IPRATROPIUM BROMIDE AND ALBUTEROL SULFATE SCH ML: .5; 3 SOLUTION RESPIRATORY (INHALATION) at 11:50

## 2018-02-14 RX ADMIN — CEFUROXIME AXETIL SCH MG: 250 TABLET ORAL at 22:23

## 2018-02-14 NOTE — PN
PROGRESS NOTE



DATE OF SERVICE:

February 14, 2018.



ATTENDING NOTE:

The patient was seen and examined by me.  I discussed with nurse practitioner, Ms. Martinez.  The patient had been doing rather well and was admitted with pneumonia and

COPD exacerbation.  Early this morning, patient is somewhat delirious, making sounds, a

bit tachypneic.  Breathing through her mouth.  There is no focal weakness.  I got blood

gases done that showed a pH of 7.51, pCO2 of 54, PO2 of 45, and oxygen saturation 81.4%

on 4 L of oxygen.



ASSESSMENT:

1. Acute hypoxic and hypercapnic respiratory failure.

2. Bilateral pneumonia for which patient is on Ceftin.  I spoke to the nurse and the

    respiratory therapist, did have them put the patient on BiPAP after talking and did

    tell them to speak to Dr. Chino and get the patient on the BiPAP.

3. The patient acute delirium is coming from the hypoxia and hypercapnia.

Later in the day patient actually was doing better.  The patient has no focal signs

otherwise.  Continue with BiPAP. Will  also add Diamox.





MMODL / IJN: [id number] / Job#: [id number]

## 2018-02-14 NOTE — P.PN
Subjective


Progress Note Date: 02/14/18


Principal diagnosis: 


Care on chronic hypoxic respiratory failure due to acute exacerbation of COPD, 

bronchiolitis obliterans, mild CHF, and pneumonia





This is a 72-year-old female well-known to me.  She's been in and out of the 

hospital recently.  She's here in the hospital gets discharged to [facility] on 

the [facility], discharged home,  back to the hospital, etc.  She was 

recently in between February 6 and February 9 with an episode of mild fluid 

overload and possible pneumonia involving the upper lobes.  She does have a 

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 

biopsy.  In addition  on that same biopsy, she was found to have significant 

microcirculatory pulmonary arterial thrombus.  For that reason, she was placed 

on both prednisone for the bronchiolitis obliterans organizing pneumonia and a 

factor X a inhibitor for the thrombi.  She been doing relatively well.  The 

prednisone was causing her side effects in the way of weight gain and fluid 

retention and the prednisone was weaned down to a smaller dose of 10 mg a day.  

She's been on the blood thinner for some time.  More recently, she was admitted 

with a diagnosis again of COPD exacerbation.  She had an abnormal chest x-ray 

which suggested possible upper lobe pneumonia.  Also, the same chest x-ray 

suggested possible interstitial edema.  BNP was mildly elevated.  She was 

treated between the sixth and ninth of February discharged on the ninth and 

came back into the emergency room this morning with complaints of increasing 

shortness of breath.  Actually, the reason for coming into the hospital was 

that her saturations there were in the mid to high 80s.  This is about normal 

for her.  For that reason EMS was called and she was transferred back in.  She 

had no new complaints. Not coughing more than usual not producing any phlegm.  

No fever no chills.  No nausea vomiting or diarrhea.  In addition, the patient 

did not have any chest pain or chest discomfort.  She was admitted to the ICU 

as a 6 selective overflow.  No beds on 6 selective.  I suspect she does not 

have myocardial ischemia and likely the elevated troponins related to supply 

demand mismatch and mild troponin leak.





On 02/12/2018 patient seen again in follow-up.  She remains in the intensive 

care is a general medical surgical overflow.  She is mildly short of breath at 

rest, her FiO2 is at 4 L per nasal cannula, with a pulse ox of 94%.  She is 

afebrile, vital signs are stable.  Chest x-ray from 02/11/2018 has been reviewed

, and showed improving interstitial change, reflecting improving interstitial 

pneumonia or congestive heart failure this was compared to the previous study 

from 02/09/2018.  There were continuing bilateral areas of consolidation.  

Blood culture has been ordered and sent, results are pending at this time.  

Patient's lung sounds are positive for diffuse wheezes, and some crackles at 

the left posterior lower base.  Patient continues on her home meds, home dose 

oral Lasix, nebulized treatments, meropenem, Xarelto, Symbicort, and her home 

dose prednisone.  She denies any chest pain, cardiology is following the 

patient regarding her elevated troponins.  No new labs today.  No acute events 

overnight, no chest congestion or significant sputum production. 





On 02/13/2018 patient seen again in follow-up.  Yesterday she was having 

hyperventilation, wheezing, shortness of breath, which was thought to be 

related to anxiety, patient was started on small dose of Xanax 0.25 mg 3 times 

daily.  This morning she is significantly confused, at times mumbling 

incoherently, at times yelling out, at times able to answer questions 

appropriately.  Lung sounds are diminished, with coarse rales over right 

posterior lower lobe, and scattered wheezing bilaterally.  She was found with 

her oxygen cannula off, and she was found to be hypoxic, with pulse ox at 84%, 

very confused and delirious.  There is no lateralizing deficits noted, patient'

s  are equal, face symmetric.  Lab work has been reviewed, BBC is 14.5, 

hemoglobin is stable at 8.1, CO2 was noted to be critically high at 41 from 

this morning's labs, BUN is 42, and creatinine is 1.43, which has increased 

from yesterday's 38 and 1.18 respectively.  Patient remains afebrile, she is 

still slightly tachycardic, but her heart rate is better controlled than 

yesterday, today it is around 113 BPM, sinus tachycardia.  Blood and sputum 

cultures showed no growth.  Patient remains on combination of Ceftin, 

prednisone 10 mg daily, Xarelto, nebulized treatments, Lasix of 40 by mouth 

twice a day and Symbicort.  Blood gas was obtained and reviewed, and it showed 

a pH of 7.51, pCO2 54, pO2 of 45, this was done on FiO2 of 32%.  This is 

consistent with metabolic acidosis and hypoxemia.  Patient will be placed on 

BiPAP support with pressures of 12/5, and FiO2 40%.  We will try to keep the O2 

saturations between 85-90%.  








Objective





- Vital Signs


Vital signs: 


 Vital Signs











Temp  98.7 F   02/14/18 08:18


 


Pulse  100   02/14/18 12:03


 


Resp  22   02/14/18 08:18


 


BP  151/73   02/14/18 08:18


 


Pulse Ox  89 L  02/14/18 08:18








 Intake & Output











 02/13/18 02/14/18 02/14/18





 18:59 06:59 18:59


 


Other:   


 


  Voiding Method Bedpan  Bedpan


 


  # Voids 5 2 


 


  # Bowel Movements 0 1 














- Exam


Patient is confused, delirious, mumbling at times, and yelling out, but at 

times it alternates with patient responding appropriately to questions.  Nasal 

O2 in place at 4 L/m, which is her baseline





HEENT examination is grossly unremarkable.  Mucous membranes are moist.  No 

oral lesions.





Neck supple.  Full range of motion.  No adenopathy thyromegaly or neck vein 

distention.





Cardiovascular examination reveals regular rhythm rate.  S1-S2 normal.  No S3 

or S4.  No discernible murmur noted.





Lungs reveal a few scattered rales over right posterior lower lobe, Scattered 

wheezes.  Breath sounds are equal bilaterally.  She sounds pretty much at 

baseline.





Abdomen soft bowel sounds are heard.  No masses or tenderness.  Abdomen is 

obese.





Extremities are intact.  There is chronic venous stasis changes and some mild 

lower summary edema.  Actually her legs look better than they left on her last 

admission..





Skin is without rash or lesion.





Neurologic examination is brief but nonfocal.








- Labs


CBC & Chem 7: 


 02/14/18 08:32





 02/14/18 08:32


Labs: 


 Abnormal Lab Results - Last 24 Hours (Table)











  02/13/18 02/13/18 02/13/18 Range/Units





  12:18 16:54 20:38 


 


WBC     (3.8-10.6)  k/uL


 


RBC     (3.80-5.40)  m/uL


 


Hgb     (11.4-16.0)  gm/dL


 


Hct     (34.0-46.0)  %


 


MCHC     (31.0-37.0)  g/dL


 


RDW     (11.5-15.5)  %


 


Neutrophils # (Manual)     (1.3-7.7)  k/uL


 


Monocytes # (Manual)     (0-1.0)  k/uL


 


Myelocytes # (Manual)     (0)  k/uL


 


Nucleated RBCs     (0-0)  /100 WBC


 


ABG pH     (7.35-7.45)  


 


ABG pCO2     (35-45)  mmHg


 


ABG pO2     ()  mmHg


 


ABG HCO3     (21-25)  mmol/L


 


ABG Total CO2     (19-24)  mmol/L


 


ABG O2 Saturation     (94-97)  %


 


Chloride     ()  mmol/L


 


Carbon Dioxide     (22-30)  mmol/L


 


BUN     (7-17)  mg/dL


 


Creatinine     (0.52-1.04)  mg/dL


 


Glucose     (74-99)  mg/dL


 


POC Glucose (mg/dL)  239 H  214 H  177 H  (75-99)  mg/dL














  02/14/18 02/14/18 02/14/18 Range/Units





  07:47 08:32 08:32 


 


WBC   14.5 H   (3.8-10.6)  k/uL


 


RBC   2.94 L   (3.80-5.40)  m/uL


 


Hgb   8.1 L   (11.4-16.0)  gm/dL


 


Hct   28.5 L   (34.0-46.0)  %


 


MCHC   28.5 L   (31.0-37.0)  g/dL


 


RDW   20.2 H   (11.5-15.5)  %


 


Neutrophils # (Manual)   8.56 H   (1.3-7.7)  k/uL


 


Monocytes # (Manual)   1.31 H   (0-1.0)  k/uL


 


Myelocytes # (Manual)   0.15 H   (0)  k/uL


 


Nucleated RBCs   1 H   (0-0)  /100 WBC


 


ABG pH     (7.35-7.45)  


 


ABG pCO2     (35-45)  mmHg


 


ABG pO2     ()  mmHg


 


ABG HCO3     (21-25)  mmol/L


 


ABG Total CO2     (19-24)  mmol/L


 


ABG O2 Saturation     (94-97)  %


 


Chloride    93 L  ()  mmol/L


 


Carbon Dioxide    41 H*  (22-30)  mmol/L


 


BUN    42 H  (7-17)  mg/dL


 


Creatinine    1.43 H  (0.52-1.04)  mg/dL


 


Glucose    125 H  (74-99)  mg/dL


 


POC Glucose (mg/dL)  134 H    (75-99)  mg/dL














  02/14/18 02/14/18 Range/Units





  11:31 11:36 


 


WBC    (3.8-10.6)  k/uL


 


RBC    (3.80-5.40)  m/uL


 


Hgb    (11.4-16.0)  gm/dL


 


Hct    (34.0-46.0)  %


 


MCHC    (31.0-37.0)  g/dL


 


RDW    (11.5-15.5)  %


 


Neutrophils # (Manual)    (1.3-7.7)  k/uL


 


Monocytes # (Manual)    (0-1.0)  k/uL


 


Myelocytes # (Manual)    (0)  k/uL


 


Nucleated RBCs    (0-0)  /100 WBC


 


ABG pH  7.51 H   (7.35-7.45)  


 


ABG pCO2  54 H   (35-45)  mmHg


 


ABG pO2  45 L*   ()  mmHg


 


ABG HCO3  43 H*   (21-25)  mmol/L


 


ABG Total CO2  45 H   (19-24)  mmol/L


 


ABG O2 Saturation  81.4 L   (94-97)  %


 


Chloride    ()  mmol/L


 


Carbon Dioxide    (22-30)  mmol/L


 


BUN    (7-17)  mg/dL


 


Creatinine    (0.52-1.04)  mg/dL


 


Glucose    (74-99)  mg/dL


 


POC Glucose (mg/dL)   129 H  (75-99)  mg/dL








 Microbiology - Last 24 Hours (Table)











 02/11/18 08:00 Blood Culture - Preliminary





 Blood    No Growth after 72 hours


 


 02/12/18 07:10 Gram Stain - Final





 Sputum Sputum Culture - Final














Assessment and Plan


Plan: 


Assessment:





1.  Acute altered mental status, patient is confused and delirious, but no 

lateralizing deficits noted, strength is equal bilaterally, face is symmetric.  

This is possibly related to metabolic encephalopathy.  Blood gas showed 

metabolic alkalosis, with hypoxemia.  Patient will be placed on BiPAP support 

with pressures 12 and 5, and 5 FiO2 40%





2. Acute on chronic hypoxic respiratory failure secondary to mild CHF, 

unspecified, COPD exacerbation, and bilateral pneumonia.  Patient has a history 

of bronchiolitis obliterans organizing pneumonia, and microcirculatory 

pulmonary arterial thrombus disease.  The patient's pretty much at baseline and 

looks about the same as she did on her last admission between February 6 and 

February 9.  I believe she was sent in because of her saturations in the mid to 

high 80s which is pretty much her baseline.





3. Recent admission for COPD exacerbation complicated by purulent 

tracheobronchitis/bronchopneumonia involving the upper lobes





4. Biopsy-proven bronchiolitis obliterans organizing pneumonia





5. Microcirculatory pulmonary arteriopathy/thrombi





6. Obesity





7. Uterine cancer





8. Diabetes mellitus





9. Gastroesophageal reflux disease





10. Hypertension





11. Hyperlipidemia





12. Restless leg syndrome





13. Migraine cephalgia





14. Status post VATS lung biopsy





Plan: 


We'll place the patient on BiPAP support, with pressures 12 and 5, and FiO2 of 

40%.  We will keep her O2 sat between 85 and 90%.  Continue monitoring her 

mental status, anticipate improvement with BiPAP support.  We will discontinue 

the Xanax, we will continue her other medications, Xarelto, nebulized treatments

, Ceftin, Symbicort, Lasix and prednisone. 





I performed a history & physical examination of the patient and discussed their 

management with my nurse practitioner, Corinne Garcia.  I reviewed the nurse 

practitioner's note and agree with the documented findings and plan of care.  

Lung sounds are positive for scattered wheezes bilaterally, and coarse rales 

over right lower lobe.  The findings and the impression was discussed with the 

patient.  I attest to the documentation by the nurse practitioner. 





Time with Patient: Less than 30

## 2018-02-14 NOTE — PN
PROGRESS NOTE



DATE OF SERVICE:

02/13/18.



ATTENDING NOTE:

The patient seen and examined by me.  Discussed with nurse practitioner Ms. Martinez.  I

saw this patient yesterday on February 13, 2018.  The patient is doing better, sitting

up, tolerating a diet.



PHYSICAL EXAMINATION:

Lungs improved air entry.  Cardiovascular 1st and 2nd sounds normal.  The patient being

discharged by Dr. Chino from Pulmonary.  Patient was due to go back to the [facility], but

discharge planning did not come through.  Continue current medication and treatment

plan.





MMODL / IJN: [id number] / Job#: [id number]

## 2018-02-14 NOTE — CDI
Last Revision, December 2017





Documentation Clarification Form



Date: 2/14/2018 3:30:00 PM

From: Jocelynn Monroe RN, CCDS

Phone: [phone]

MRN: [id number]

Admit Date: 2/11/2018 9:53:00 AM

Patient Name: Jenifer Elizalde

Visit Number: [id number]

Discharge Date:



ATTENTION: The Clinical Documentation Specialists (CDI) and [facility] Coding Staff 
appreciate your assistance in clarifying documentation. Please respond to the 
clarification below the line at the bottom and electronically sign. The CDI & 
[facility] Coding staff will review the response and follow-up if needed. Please note: 
Queries are made part of the Legal Health Record. If you have any questions, 
please contact the author of this message via ITS.



Dr. Tamra Akbar





History/Risk Factors:. COPD,  DM,  Pneumonia, BOOP, Hypertension, Chronic 
hypoxic respiratory failure maintained on 4/L NC,  Uterine CA 



Clinical Indicators:  Admit with complaints of shortness of breath.

Lungs: Expiratory wheezes and rhonchi

Extremities: Chronic bilateral extremity edema



2/12/14 Cardiology progress notes: Chest x-ray showed evidence of infiltrates 
and questionable CHF. Her proBNP is elevated. Increase dose of diuretics



2/14/18 Pulmonary progress notes: Chronic hypoxic respiratory failure due to 
acute exacerbation of COPD, bronchiolitis obliterans, mild CHF  and pneumonia



2/12/14 VS/Pulse OX: 150/61 91 18  92 % 4/L NC

BNP: 2110

Echocardiogram Results: EF between 55-60 % 

 Chest X -Ray: 2/11/18: improving interstitial pneumonia or congestive heart 
failure this was compared to previous study from 2/9/18. (Per Pulmonary review)



Treatment:  

Lasix PO

Monitor O2 Sat's (titrate O2)

Monitor Labs

Nebulized treatments

Ceftin PO



 In your professional opinion, can you please clarify the acuity and type of 
CHF if known Or if CHF ruled out?





Systolic Heart Failure:

Acute 

Chronic

Acute on Chronic  



Diastolic Heart Failure:

Acute 

Chronic

X Acute on Chronic



Systolic & Diastolic Heart Failure:

Acute 

Chronic

Acute on Chronic Heart Failure



Unable to Determine

Other, please specify

   





Please continue to document in your progress notes and discharge summary in 
order to capture severity of illness and risk of mortality. Include clinical 
findings that support your diagnosis.

___________________________________________________________________
MTDD

## 2018-02-14 NOTE — P.PN
Progress Note - Text


Progress Note Date: 02/13/18


DATE OF SERVICE: 


02/13/2018





PRESENTING COMPLAINT:


Increasing shortness of breath and wheezing





HISTORY OF PRESENT ILLNESS:


72-year-old female was recently discharged from this facility to [facility] on 02/09/2018 after having multilevel bilateral pneumonia 

with sepsis and delirium.  [facility] after experiencing increasing 

difficulty breathing using accessory muscles found to be hot and patient 

reporting that she was wheezing more and developed a cough.  Admitted for the 

same.





INTERVAL HISTORY:


February 13 2018:


Sitting up in bed appears comfortable, very perky.  Still has some shortness of 

breath but not obvious.  Has an occasional cough does not sound congested.  No 

nasal drainage no sputum production.  Oxygen continuous at 3 L nasal cannula 

this is her baseline.  Tolerating her diet eating 75% to 100% of her meals up 

to the chair with some assistance last BM 02/12/2018.





02/12/2018:


Patient sitting up in bed appears comfortable.  Very perky.  No apparent or 

obvious shortness of breath.  Has transient cough does not sound congested in 

any way.  No nasal drainage no sputum production.  Has oxygen on at 3 L nasal 

cannula this is her baseline.  Tolerating her diet ambulatory and 7500% of her 

meals.  Up to the chair with assistance.  Last BM 02/12/2018.





REVIEW OF SYSTEMS:


Done for constitutional ,cardiovascular, GI, pulmonary with relevant findings 

as above.





CURRENT MEDICATIONS


Tylenol, DuoNeb, aspirin, Pulmicort, Symbicort, cholecalciferol, ferrous sulfate

, furosemide, insulin sliding scale, Levemir, latanoprost melatonin, meropenem, 

Bactroban ointment, Nitrostat, potassium chloride, prednisone, Requip, Xarelto, 

Silvadene cream.





PHYSICAL EXAM


VITAL SIGNS: 


Temperature 97.1, pulse 113, blood pressure 127/85, respiratory rate 18, oxygen 

saturation 84% on 4 L. 





GENERAL APPEARANCE:  Lying in bed, not in distress. 


HENT: Normocephalic, JVD not raised. Mass not palpable.  Oral cavity normal, 

external appearance of ears and nose normal.


EYES:Pupils equal. Conjunctiva normal.


RESPIRATORY: Respiratory effort mildly increased Lungs diminished to 

auscultation. 


CARDIOVASCULAR: First and second sounds normal. No edema. 


ABDOMEN: Soft. Liver and spleen not palpable. No tenderness. No mass palpable. 


PSYCHIATRY: Alert and oriented x3. Mood and affect normal. 


INTEGUMENT: Chronic lymphedema.  Bilateral lower extremities with wounds 

healing well.  No evidence of infection or cellulitis.





INVESTIGATIONS:


LABS: Accu-Cheks noted.





ASSESSMENT:


-Acute bilateral pneumonia with possibly worsening infiltrate and causing 

sepsis.  Present on admission.


-Chronic sigmoid diverticulosis.


-Chronic hypoxic respiratory failure on 3 L of oxygen at home from underlying 

chronic obstructive pulmonary disease.


-Acute chronic obstructive pulmonary disease exacerbation in an ex-smoker.


-Obesity hypoventilation syndrome.


-Cor pulmonale from above conditions.


-Diabetes mellitus type 2, chronically on insulin.


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


-Chronic low back pain, spinal stenosis.


-Chronic bilateral lower extremity lymphedema.


-Chronic urinary stress incontinence.


-Gait dysfunction uses walker.


-Chronic lower extremity cellulitis.


-Chronic versus leg syndrome.


-History of splenectomy.





PLAN:


Continue patient on Ceftin and nebulized bronchodilators as well as inhaled 

steroids and increase prednisone to 40 mg.  plan is to discharge in the next 24-

48 hours.  Plan of care discussed with patient bedside she is in agreement we 

will follow closely.





NP statement: Patient was seen and examined by nurse practitioner Priscilla Martinez and all elements of the case discussed with attending  Dr. Villa

## 2018-02-15 LAB
ANION GAP SERPL CALC-SCNC: 8 MMOL/L
BASOPHILS # BLD AUTO: 0.1 K/UL (ref 0–0.2)
BASOPHILS NFR BLD AUTO: 1 %
BUN SERPL-SCNC: 41 MG/DL (ref 7–17)
CALCIUM SPEC-MCNC: 8.6 MG/DL (ref 8.4–10.2)
CHLORIDE SERPL-SCNC: 93 MMOL/L (ref 98–107)
CO2 SERPL-SCNC: 41 MMOL/L (ref 22–30)
EOSINOPHIL # BLD AUTO: 0.3 K/UL (ref 0–0.7)
EOSINOPHIL NFR BLD AUTO: 2 %
ERYTHROCYTE [DISTWIDTH] IN BLOOD BY AUTOMATED COUNT: 2.91 M/UL (ref 3.8–5.4)
ERYTHROCYTE [DISTWIDTH] IN BLOOD: 20 % (ref 11.5–15.5)
GLUCOSE BLD-MCNC: 116 MG/DL (ref 75–99)
GLUCOSE BLD-MCNC: 185 MG/DL (ref 75–99)
GLUCOSE BLD-MCNC: 216 MG/DL (ref 75–99)
GLUCOSE BLD-MCNC: 234 MG/DL (ref 75–99)
GLUCOSE SERPL-MCNC: 105 MG/DL (ref 74–99)
HCT VFR BLD AUTO: 28.3 % (ref 34–46)
HGB BLD-MCNC: 8.3 GM/DL (ref 11.4–16)
LYMPHOCYTES # SPEC AUTO: 4 K/UL (ref 1–4.8)
LYMPHOCYTES NFR SPEC AUTO: 25 %
MCH RBC QN AUTO: 28.5 PG (ref 25–35)
MCHC RBC AUTO-ENTMCNC: 29.3 G/DL (ref 31–37)
MCV RBC AUTO: 97.1 FL (ref 80–100)
MONOCYTES # BLD AUTO: 1 K/UL (ref 0–1)
MONOCYTES NFR BLD AUTO: 6 %
NEUTROPHILS # BLD AUTO: 10.5 K/UL (ref 1.3–7.7)
NEUTROPHILS NFR BLD AUTO: 65 %
PLATELET # BLD AUTO: 355 K/UL (ref 150–450)
POTASSIUM SERPL-SCNC: 3.9 MMOL/L (ref 3.5–5.1)
SODIUM SERPL-SCNC: 142 MMOL/L (ref 137–145)
WBC # BLD AUTO: 16.2 K/UL (ref 3.8–10.6)

## 2018-02-15 RX ADMIN — INSULIN ASPART SCH: 100 INJECTION, SOLUTION INTRAVENOUS; SUBCUTANEOUS at 08:03

## 2018-02-15 RX ADMIN — MUPIROCIN SCH APPLIC: 20 OINTMENT TOPICAL at 18:05

## 2018-02-15 RX ADMIN — IPRATROPIUM BROMIDE AND ALBUTEROL SULFATE SCH ML: .5; 3 SOLUTION RESPIRATORY (INHALATION) at 16:26

## 2018-02-15 RX ADMIN — MUPIROCIN SCH APPLIC: 20 OINTMENT TOPICAL at 22:04

## 2018-02-15 RX ADMIN — INSULIN ASPART SCH UNIT: 100 INJECTION, SOLUTION INTRAVENOUS; SUBCUTANEOUS at 18:04

## 2018-02-15 RX ADMIN — Medication SCH MG: at 20:50

## 2018-02-15 RX ADMIN — IPRATROPIUM BROMIDE AND ALBUTEROL SULFATE SCH ML: .5; 3 SOLUTION RESPIRATORY (INHALATION) at 07:47

## 2018-02-15 RX ADMIN — POTASSIUM CHLORIDE SCH MEQ: 750 TABLET, EXTENDED RELEASE ORAL at 08:36

## 2018-02-15 RX ADMIN — INSULIN ASPART SCH UNIT: 100 INJECTION, SOLUTION INTRAVENOUS; SUBCUTANEOUS at 18:05

## 2018-02-15 RX ADMIN — Medication SCH MG: at 08:36

## 2018-02-15 RX ADMIN — FUROSEMIDE SCH MG: 40 TABLET ORAL at 08:36

## 2018-02-15 RX ADMIN — Medication SCH UNIT: at 20:56

## 2018-02-15 RX ADMIN — INSULIN ASPART SCH UNIT: 100 INJECTION, SOLUTION INTRAVENOUS; SUBCUTANEOUS at 22:04

## 2018-02-15 RX ADMIN — CEFUROXIME AXETIL SCH MG: 250 TABLET ORAL at 08:36

## 2018-02-15 RX ADMIN — Medication SCH: at 21:02

## 2018-02-15 RX ADMIN — INSULIN ASPART SCH UNIT: 100 INJECTION, SOLUTION INTRAVENOUS; SUBCUTANEOUS at 13:29

## 2018-02-15 RX ADMIN — RIVAROXABAN SCH MG: 10 TABLET, FILM COATED ORAL at 08:36

## 2018-02-15 RX ADMIN — MUPIROCIN SCH APPLIC: 20 OINTMENT TOPICAL at 09:30

## 2018-02-15 RX ADMIN — INSULIN DETEMIR SCH UNIT: 100 INJECTION, SOLUTION SUBCUTANEOUS at 22:04

## 2018-02-15 RX ADMIN — BUDESONIDE AND FORMOTEROL FUMARATE DIHYDRATE SCH: 160; 4.5 AEROSOL RESPIRATORY (INHALATION) at 20:13

## 2018-02-15 RX ADMIN — BUDESONIDE AND FORMOTEROL FUMARATE DIHYDRATE SCH: 160; 4.5 AEROSOL RESPIRATORY (INHALATION) at 07:49

## 2018-02-15 RX ADMIN — LATANOPROST SCH DROPS: 50 SOLUTION OPHTHALMIC at 20:49

## 2018-02-15 RX ADMIN — IPRATROPIUM BROMIDE AND ALBUTEROL SULFATE SCH ML: .5; 3 SOLUTION RESPIRATORY (INHALATION) at 11:43

## 2018-02-15 RX ADMIN — CEFUROXIME AXETIL SCH MG: 250 TABLET ORAL at 20:49

## 2018-02-15 RX ADMIN — IPRATROPIUM BROMIDE AND ALBUTEROL SULFATE SCH: .5; 3 SOLUTION RESPIRATORY (INHALATION) at 20:13

## 2018-02-15 NOTE — XR
EXAMINATION TYPE: XR chest 1V portable

 

DATE OF EXAM: 2/15/2018

 

COMPARISON: 2/11/2018

 

HISTORY: Post chest tube removal.

 

TECHNIQUE: Single frontal view of the chest is obtained.

 

FINDINGS:  There is interstitial pattern which appears chronic. More focal area of consolidation the 
right upper lobe noted which is stable. No pleural effusion or pneumothorax. Arthropathy of the shoul
ders. The heart is prominent.

 

IMPRESSION:  

1. Findings suggest chronic interstitial lung disease or fibrosis.

2. Right upper lobe mass or consolidation.

## 2018-02-15 NOTE — P.PN
Progress Note - Text


Progress Note Date: 02/15/18


DATE OF SERVICE: 


02/15/2018





PRESENTING COMPLAINT:


Increasing shortness of breath and wheezing





HISTORY OF PRESENT ILLNESS:


72-year-old female was recently discharged from this facility to [facility] on 02/09/2018 after having multilevel bilateral pneumonia 

with sepsis and delirium.  Was returned from [facility] after experiencing increasing 

difficulty breathing using accessory muscles found to be hot and patient 

reporting that she was wheezing more and developed a cough.  Admitted for the 

same.





INTERVAL HISTORY:


02/15/2018:


Sitting up in the bed, is sleeping.  Easily arousable.  Did not require any 

further use of the BiPAP today.  On nasal cannula currently 3 L.  This is her 

baseline.  Patient does not remember any of her behaviors from yesterday at 

all.  She is much better today alert and oriented able to answer questions 

appropriately not making random singing noises.  Renal profile is worsening.  

Tolerating her diet eating between about 50% of her meals.  Up with assistance.

  Last BM 02/14/2018.





02/14/2018:


Patient was to go to [facility] this morning however, she was Sitting up in bed 

making repetitive noises somewhat delirious although as I walk into the room 

patient does recognize me and knows my name and then begins making repetitive 

sound after she repeats my name.  ABGs obtained revealed pH of 7.51, pCO2 of 54

, pO2 of 45, and oxygen saturation of 81.4% on 4 L of oxygen.  Placed on the 

BiPAP.





February 13 2018:


Sitting up in bed appears comfortable, very perky.  Still has some shortness of 

breath but not obvious.  Has an occasional cough does not sound congested.  No 

nasal drainage no sputum production.  Oxygen continuous at 3 L nasal cannula 

this is her baseline.  Tolerating her diet eating 75% to 100% of her meals up 

to the chair with some assistance last BM 02/12/2018.





02/12/2018:


Patient sitting up in bed appears comfortable.  Very perky.  No apparent or 

obvious shortness of breath.  Has transient cough does not sound congested in 

any way.  No nasal drainage no sputum production.  Has oxygen on at 3 L nasal 

cannula this is her baseline.  Tolerating her diet ambulatory and  % of 

her meals.  Up to the chair with assistance.  Last BM 02/12/2018.





REVIEW OF SYSTEMS:


Done for constitutional ,cardiovascular, GI, pulmonary with relevant findings 

as above.





CURRENT MEDICATIONS


Tylenol, DuoNeb, aspirin, Pulmicort, Symbicort, cholecalciferol, ferrous sulfate

, furosemide, insulin sliding scale, Levemir, latanoprost, melatonin, meropenem

, Bactroban ointment, Nitrostat, potassium chloride prednisone, Requip, Xarelto

, Silvadene cream.  Diamox





PHYSICAL EXAM


VITAL SIGNS: 





GENERAL APPEARANCE:  Lying in bed somewhat delirious


HENT: Normocephalic, JVD not raised. Mass not palpable.  Oral cavity normal, 

external appearance of ears and nose normal.


EYES:Pupils equal. Conjunctiva normal.


RESPIRATORY: Respiratory effort mildly increased Lungs diminished to 

auscultation, BiPAP in place 


CARDIOVASCULAR: First and second sounds normal. No edema. 


ABDOMEN: Soft. Liver and spleen not palpable. No tenderness. No mass palpable. 


PSYCHIATRY: Able to answer some questions but then reverts to making repetitive 

noises, able to state her own name.  Does seem to understand that there was 

something wrong and she was not able to communicate appropriately.


INTEGUMENT: Chronic lymphedema.  Bilateral lower extremities with wounds 

healing well.  No evidence of infection or cellulitis.





INVESTIGATIONS:


LABS: 


White blood cell count 16.2, hemoglobin 8.3, chloride 93, carbon dioxide 41, 

BUN 41, creatinine 1.58, Accu-Cheks noted.


Chest x-ray: Suggest chronic interstitial lung disease or fibrosis, right upper 

lobe mass or consolidation.





ASSESSMENT:


-Acute hypoxic and hypercapnic respiratory failure


-Acute bilateral pneumonia with possibly worsening infiltrate and causing 

sepsis.  Present on admission, patient on Ceftin.


-Acute delirium likely due to hypoxia and hypercapnia, improved


-Chronic sigmoid diverticulosis.


-Chronic hypoxic respiratory failure on 3 L of oxygen at home from underlying 

chronic obstructive pulmonary disease.


-Acute chronic obstructive pulmonary disease exacerbation in an ex-smoker.


-Obesity hypoventilation syndrome.


-Cor pulmonale from above conditions.


-Diabetes mellitus type 2, chronically on insulin.


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


-Chronic low back pain, spinal stenosis.


-Chronic bilateral lower extremity lymphedema.


-Chronic urinary stress incontinence.


-Gait dysfunction uses walker.


-Chronic lower extremity cellulitis.


-Chronic versus leg syndrome.


-History of splenectomy.





PLAN:


Continue patient on Ceftin and nebulized bronchodilators as well as inhaled 

steroids and prednisone taper.  Patient has underlying Boop steroid dose has 

been increased as a result.  Steroids will be tapered per pulmonology based on 

patient response.  We'll likely transfer patient back to [facility] on the leg 

tomorrow with pulmonology to continue to follow.  Plan of care discussed at the 

bedside we will follow closely.





NP statement: Patient was seen and examined by nurse practitioner Priscilla Martinez and all elements of the case discussed with attending  Dr. Villa

## 2018-02-15 NOTE — P.PN
Subjective


Progress Note Date: 02/15/18


Principal diagnosis: 


Care on chronic hypoxic respiratory failure due to acute exacerbation of COPD, 

bronchiolitis obliterans, mild CHF, and pneumonia





This is a 72-year-old female well-known to me.  She's been in and out of the 

hospital recently.  She's here in the hospital gets discharged to [facility] on 

the [facility], discharged home,  back to the hospital, etc.  She was 

recently in between February 6 and February 9 with an episode of mild fluid 

overload and possible pneumonia involving the upper lobes.  She does have a 

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 

biopsy.  In addition  on that same biopsy, she was found to have significant 

microcirculatory pulmonary arterial thrombus.  For that reason, she was placed 

on both prednisone for the bronchiolitis obliterans organizing pneumonia and a 

factor X a inhibitor for the thrombi.  She been doing relatively well.  The 

prednisone was causing her side effects in the way of weight gain and fluid 

retention and the prednisone was weaned down to a smaller dose of 10 mg a day.  

She's been on the blood thinner for some time.  More recently, she was admitted 

with a diagnosis again of COPD exacerbation.  She had an abnormal chest x-ray 

which suggested possible upper lobe pneumonia.  Also, the same chest x-ray 

suggested possible interstitial edema.  BNP was mildly elevated.  She was 

treated between the sixth and ninth of February discharged on the ninth and 

came back into the emergency room this morning with complaints of increasing 

shortness of breath.  Actually, the reason for coming into the hospital was 

that her saturations there were in the mid to high 80s.  This is about normal 

for her.  For that reason EMS was called and she was transferred back in.  She 

had no new complaints. Not coughing more than usual not producing any phlegm.  

No fever no chills.  No nausea vomiting or diarrhea.  In addition, the patient 

did not have any chest pain or chest discomfort.  She was admitted to the ICU 

as a 6 selective overflow.  No beds on 6 selective.  I suspect she does not 

have myocardial ischemia and likely the elevated troponins related to supply 

demand mismatch and mild troponin leak.





On 02/12/2018 patient seen again in follow-up.  She remains in the intensive 

care is a general medical surgical overflow.  She is mildly short of breath at 

rest, her FiO2 is at 4 L per nasal cannula, with a pulse ox of 94%.  She is 

afebrile, vital signs are stable.  Chest x-ray from 02/11/2018 has been reviewed

, and showed improving interstitial change, reflecting improving interstitial 

pneumonia or congestive heart failure this was compared to the previous study 

from 02/09/2018.  There were continuing bilateral areas of consolidation.  

Blood culture has been ordered and sent, results are pending at this time.  

Patient's lung sounds are positive for diffuse wheezes, and some crackles at 

the left posterior lower base.  Patient continues on her home meds, home dose 

oral Lasix, nebulized treatments, meropenem, Xarelto, Symbicort, and her home 

dose prednisone.  She denies any chest pain, cardiology is following the 

patient regarding her elevated troponins.  No new labs today.  No acute events 

overnight, no chest congestion or significant sputum production. 





On 02/14/2018 patient seen again in follow-up.  Yesterday she was having 

hyperventilation, wheezing, shortness of breath, which was thought to be 

related to anxiety, patient was started on small dose of Xanax 0.25 mg 3 times 

daily.  This morning she is significantly confused, at times mumbling 

incoherently, at times yelling out, at times able to answer questions 

appropriately.  Lung sounds are diminished, with coarse rales over right 

posterior lower lobe, and scattered wheezing bilaterally.  She was found with 

her oxygen cannula off, and she was found to be hypoxic, with pulse ox at 84%, 

very confused and delirious.  There is no lateralizing deficits noted, patient'

s  are equal, face symmetric.  Lab work has been reviewed, BBC is 14.5, 

hemoglobin is stable at 8.1, CO2 was noted to be critically high at 41 from 

this morning's labs, BUN is 42, and creatinine is 1.43, which has increased 

from yesterday's 38 and 1.18 respectively.  Patient remains afebrile, she is 

still slightly tachycardic, but her heart rate is better controlled than 

yesterday, today it is around 113 BPM, sinus tachycardia.  Blood and sputum 

cultures showed no growth.  Patient remains on combination of Ceftin, 

prednisone 10 mg daily, Xarelto, nebulized treatments, Lasix of 40 by mouth 

twice a day and Symbicort.  Blood gas was obtained and reviewed, and it showed 

a pH of 7.51, pCO2 54, pO2 of 45, this was done on FiO2 of 32%.  This is 

consistent with metabolic acidosis and hypoxemia.  Patient will be placed on 

BiPAP support with pressures of 12/5, and FiO2 40%.  We will try to keep the O2 

saturations between 85-90%.  





On 02/15/2018 patient seen in follow-up.  Her mentation is slightly improved, 

she still mumbles incoherently at times, but she has not been yelling out and 

she appears to be more appropriate today.  Lung sounds are positive for 

scattered expiratory wheezes, but this appears to be improved from yesterday's 

exam.  She did wear the BiPAP last night, she is currently on 3 L per nasal 

cannula with O2 sat at 85%.  She is afebrile.  Sputum and blood cultures show 

no growth.  She remains on oral Ceftin, today's lab work shows increase in the 

WBCs up to 16.2, hemoglobin is stable at 8.3, serum sodium is 142, potassium is 

3.9, chloride is 93, CO2 is 41, BUN is 41, and creatinine is 1.58.  Patient's 

renal profile is steadily worsening.  Her oral intake has been poor, she is on 

oral Lasix at 40 mg twice a day, Diamox has been added per attending physician.

  





Objective





- Vital Signs


Vital signs: 


 Vital Signs











Temp  99.4 F   02/15/18 07:00


 


Pulse  96   02/15/18 07:59


 


Resp  20   02/15/18 07:00


 


BP  126/74   02/15/18 08:40


 


Pulse Ox  85 L  02/15/18 07:00








 Intake & Output











 02/14/18 02/15/18 02/15/18





 18:59 06:59 18:59


 


Intake Total  650 


 


Balance  650 


 


Intake:   


 


  Oral  650 


 


Other:   


 


  Voiding Method Bedpan Bedpan 


 


  # Voids 2 4 


 


  # Bowel Movements 2  














- Exam


Patient is confused,  mumbling at times.  Nasal O2 in place at 3 L/m, which is 

her baseline





HEENT examination is grossly unremarkable.  Mucous membranes are moist.  No 

oral lesions.





Neck supple.  Full range of motion.  No adenopathy thyromegaly or neck vein 

distention.





Cardiovascular examination reveals regular rhythm rate.  S1-S2 normal.  No S3 

or S4.  No discernible murmur noted.





Lungs reveal a few scattered wheezes.  Breath sounds are equal bilaterally.  





Abdomen soft bowel sounds are heard.  No masses or tenderness.  Abdomen is 

obese.





Extremities are intact.  There is chronic venous stasis changes and some mild 

lower summary edema.  Skin on bilateral lower extremities is positive for some 

wrinkling 





Skin is without rash or lesion.





Neurologic examination is brief but nonfocal.








- Labs


CBC & Chem 7: 


 02/15/18 08:25





 02/15/18 08:25


Labs: 


 Abnormal Lab Results - Last 24 Hours (Table)











  02/14/18 02/14/18 02/14/18 Range/Units





  11:31 11:36 17:14 


 


WBC     (3.8-10.6)  k/uL


 


RBC     (3.80-5.40)  m/uL


 


Hgb     (11.4-16.0)  gm/dL


 


Hct     (34.0-46.0)  %


 


MCHC     (31.0-37.0)  g/dL


 


RDW     (11.5-15.5)  %


 


Neutrophils #     (1.3-7.7)  k/uL


 


ABG pH  7.51 H    (7.35-7.45)  


 


ABG pCO2  54 H    (35-45)  mmHg


 


ABG pO2  45 L*    ()  mmHg


 


ABG HCO3  43 H*    (21-25)  mmol/L


 


ABG Total CO2  45 H    (19-24)  mmol/L


 


ABG O2 Saturation  81.4 L    (94-97)  %


 


Chloride     ()  mmol/L


 


Carbon Dioxide     (22-30)  mmol/L


 


BUN     (7-17)  mg/dL


 


Creatinine     (0.52-1.04)  mg/dL


 


Glucose     (74-99)  mg/dL


 


POC Glucose (mg/dL)   129 H  205 H  (75-99)  mg/dL














  02/14/18 02/15/18 02/15/18 Range/Units





  20:58 07:11 08:25 


 


WBC    16.2 H  (3.8-10.6)  k/uL


 


RBC    2.91 L  (3.80-5.40)  m/uL


 


Hgb    8.3 L  (11.4-16.0)  gm/dL


 


Hct    28.3 L  (34.0-46.0)  %


 


MCHC    29.3 L  (31.0-37.0)  g/dL


 


RDW    20.0 H  (11.5-15.5)  %


 


Neutrophils #    10.5 H  (1.3-7.7)  k/uL


 


ABG pH     (7.35-7.45)  


 


ABG pCO2     (35-45)  mmHg


 


ABG pO2     ()  mmHg


 


ABG HCO3     (21-25)  mmol/L


 


ABG Total CO2     (19-24)  mmol/L


 


ABG O2 Saturation     (94-97)  %


 


Chloride     ()  mmol/L


 


Carbon Dioxide     (22-30)  mmol/L


 


BUN     (7-17)  mg/dL


 


Creatinine     (0.52-1.04)  mg/dL


 


Glucose     (74-99)  mg/dL


 


POC Glucose (mg/dL)  295 H  116 H   (75-99)  mg/dL














  02/15/18 Range/Units





  08:25 


 


WBC   (3.8-10.6)  k/uL


 


RBC   (3.80-5.40)  m/uL


 


Hgb   (11.4-16.0)  gm/dL


 


Hct   (34.0-46.0)  %


 


MCHC   (31.0-37.0)  g/dL


 


RDW   (11.5-15.5)  %


 


Neutrophils #   (1.3-7.7)  k/uL


 


ABG pH   (7.35-7.45)  


 


ABG pCO2   (35-45)  mmHg


 


ABG pO2   ()  mmHg


 


ABG HCO3   (21-25)  mmol/L


 


ABG Total CO2   (19-24)  mmol/L


 


ABG O2 Saturation   (94-97)  %


 


Chloride  93 L  ()  mmol/L


 


Carbon Dioxide  41 H*  (22-30)  mmol/L


 


BUN  41 H  (7-17)  mg/dL


 


Creatinine  1.58 H  (0.52-1.04)  mg/dL


 


Glucose  105 H  (74-99)  mg/dL


 


POC Glucose (mg/dL)   (75-99)  mg/dL








 Microbiology - Last 24 Hours (Table)











 02/11/18 08:00 Blood Culture - Preliminary





 Blood    No Growth after 96 hours














Assessment and Plan


Plan: 


Assessment:





1.  Acute altered mental status, improving.  This is possibly related to 

metabolic encephalopathy.  Blood gas showed metabolic alkalosis, with 

hypoxemia.  Patient was placed on BiPAP support with pressures 12 and 5, and 5 

FiO2 40%.  This morning her mentation was noted to be better, although still 

remains disoriented to time and still mumbles at times but has not been yelling 

out.  Her face is symmetric, her  are equal, no obvious lateralizing 

deficits noted. 





2. Acute on chronic hypoxic respiratory failure secondary to mild CHF, 

unspecified, COPD exacerbation, and bilateral pneumonia.  Patient has a history 

of bronchiolitis obliterans organizing pneumonia, and microcirculatory 

pulmonary arterial thrombus disease.  The patient's pretty much at baseline and 

looks about the same as she did on her last admission between February 6 and 

February 9.  I believe she was sent in because of her saturations in the mid to 

high 80s which is pretty much her baseline.





3.  Acute on chronic renal failure, possibly related to ATN. 





4.  Hypochloremic metabolic alkalosis, related to diuresis and decreased oral 

intake





5. Recent admission for COPD exacerbation complicated by purulent 

tracheobronchitis/bronchopneumonia involving the upper lobes





6. Biopsy-proven bronchiolitis obliterans organizing pneumonia





7. Microcirculatory pulmonary arteriopathy/thrombi





8. Obesity





9. Uterine cancer





10. Diabetes mellitus





11. Gastroesophageal reflux disease





12. Hypertension





13. Hyperlipidemia





14. Restless leg syndrome





15. Migraine cephalgia





16. Status post VATS lung biopsy





Plan: 


Patient's mentation is somewhat improved this morning, lung sounds are positive 

for some scattered wheezing, but no rales.  Patient's renal profile is worsening

, she also developed volume contraction alkalosis.  She has had limited oral 

intake, her mucous membranes are dry, her bilateral lower extremities are 

positive for wrinkling.  We will decrease the Lasix to 40 mg daily.  Patient 

has been started on oral Diamox.  Encourage oral intake.  Maintain aspiration 

precautions.  We will obtain a repeat chest x-ray this morning.  Microbiology 

remains negative so far, continue current antibiotics, current prednisone dose 

and Xarelto.  We'll continue to follow.





I performed a history & physical examination of the patient and discussed their 

management with my nurse practitioner, Corinne Garcia.  I reviewed the nurse 

practitioner's note and agree with the documented findings and plan of care.  

Lung sounds are positive for scattered wheezes bilaterally, and coarse rales 

over right lower lobe.  The findings and the impression was discussed with the 

patient.  I attest to the documentation by the nurse practitioner. 





Time with Patient: Less than 30

## 2018-02-15 NOTE — PN
PROGRESS NOTE



DATE OF SERVICE:

02/15/2018.



ATTENDING NOTE:

Patient seen and examined by me.  I discussed with nurse practitioner Ms. Martinez.

Patient admitted with pneumonia and COPD exacerbation.  Doing better.  Delirium

resolved.  Breathing better.  Did tolerate a diet.  The patient received BiPAP.



PHYSICAL EXAMINATION:

Temperature 99.4, pulse 107, blood pressure 91/66, pulse ox 85% on 3 L.



ASSESSMENT:

1. Acute hypoxic and hypercapnic respiratory failure.

2. Acute delirium improved.

3. Discussed with Dr. Aragon.  The patient's underlying BOOP, dose of steroid has been

    increased.  Diamox was added for metabolic alkalosis.

The patient discharged to be held.





MMODL / IJN: [id number] / Job#: [id number]

## 2018-02-16 VITALS — DIASTOLIC BLOOD PRESSURE: 76 MMHG | TEMPERATURE: 97.7 F | HEART RATE: 108 BPM | SYSTOLIC BLOOD PRESSURE: 131 MMHG

## 2018-02-16 VITALS — RESPIRATION RATE: 22 BRPM

## 2018-02-16 LAB
GLUCOSE BLD-MCNC: 148 MG/DL (ref 75–99)
GLUCOSE BLD-MCNC: 268 MG/DL (ref 75–99)

## 2018-02-16 RX ADMIN — INSULIN ASPART SCH UNIT: 100 INJECTION, SOLUTION INTRAVENOUS; SUBCUTANEOUS at 08:08

## 2018-02-16 RX ADMIN — INSULIN ASPART SCH UNIT: 100 INJECTION, SOLUTION INTRAVENOUS; SUBCUTANEOUS at 12:34

## 2018-02-16 RX ADMIN — IPRATROPIUM BROMIDE AND ALBUTEROL SULFATE SCH ML: .5; 3 SOLUTION RESPIRATORY (INHALATION) at 11:49

## 2018-02-16 RX ADMIN — CEFUROXIME AXETIL SCH MG: 250 TABLET ORAL at 08:09

## 2018-02-16 RX ADMIN — POTASSIUM CHLORIDE SCH MEQ: 750 TABLET, EXTENDED RELEASE ORAL at 08:10

## 2018-02-16 RX ADMIN — BUDESONIDE AND FORMOTEROL FUMARATE DIHYDRATE SCH PUFF: 160; 4.5 AEROSOL RESPIRATORY (INHALATION) at 08:41

## 2018-02-16 RX ADMIN — RIVAROXABAN SCH MG: 10 TABLET, FILM COATED ORAL at 08:10

## 2018-02-16 RX ADMIN — Medication SCH MG: at 08:10

## 2018-02-16 RX ADMIN — IPRATROPIUM BROMIDE AND ALBUTEROL SULFATE SCH ML: .5; 3 SOLUTION RESPIRATORY (INHALATION) at 08:41

## 2018-02-16 RX ADMIN — MUPIROCIN SCH APPLIC: 20 OINTMENT TOPICAL at 08:11

## 2018-02-16 RX ADMIN — IPRATROPIUM BROMIDE AND ALBUTEROL SULFATE SCH: .5; 3 SOLUTION RESPIRATORY (INHALATION) at 15:34

## 2018-02-16 NOTE — P.PN
Subjective


Progress Note Date: 02/16/18


Principal diagnosis: 


Acute on chronic hypoxic respiratory failure, multifactorial.








This is a 72-year-old female well-known to me.  She's been in and out of the 

hospital recently.  She's here in the hospital gets discharged to [facility] on 

the [place] home, discharged home,  back to the hospital, etc.  She was 

recently in between February 6 and February 9 with an episode of mild fluid 

overload and possible pneumonia involving the upper lobes.  She does have a 

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 

biopsy.  In addition  on that same biopsy, she was found to have significant 

microcirculatory pulmonary arterial thrombus.  For that reason, she was placed 

on both prednisone for the bronchiolitis obliterans organizing pneumonia and a 

factor X a inhibitor for the thrombi.  She been doing relatively well.  The 

prednisone was causing her side effects in the way of weight gain and fluid 

retention and the prednisone was weaned down to a smaller dose of 10 mg a day.  

She's been on the blood thinner for some time.  More recently, she was admitted 

with a diagnosis again of COPD exacerbation.  She had an abnormal chest x-ray 

which suggested possible upper lobe pneumonia.  Also, the same chest x-ray 

suggested possible interstitial edema.  BNP was mildly elevated.  She was 

treated between the sixth and ninth of February discharged on the ninth and 

came back into the emergency room this morning with complaints of increasing 

shortness of breath.  Actually, the reason for coming into the hospital was 

that her saturations there were in the mid to high 80s.  This is about normal 

for her.  For that reason EMS was called and she was transferred back in.  She 

had no new complaints. Not coughing more than usual not producing any phlegm.  

No fever no chills.  No nausea vomiting or diarrhea.  In addition, the patient 

did not have any chest pain or chest discomfort.  She was admitted to the ICU 

as a 6 selective overflow.  No beds on 6 selective.  I suspect she does not 

have myocardial ischemia and likely the elevated troponins related to supply 

demand mismatch and mild troponin leak.





On 02/12/2018 patient seen again in follow-up.  She remains in the intensive 

care is a general medical surgical overflow.  She is mildly short of breath at 

rest, her FiO2 is at 4 L per nasal cannula, with a pulse ox of 94%.  She is 

afebrile, vital signs are stable.  Chest x-ray from 02/11/2018 has been reviewed

, and showed improving interstitial change, reflecting improving interstitial 

pneumonia or congestive heart failure this was compared to the previous study 

from 02/09/2018.  There were continuing bilateral areas of consolidation.  

Blood culture has been ordered and sent, results are pending at this time.  

Patient's lung sounds are positive for diffuse wheezes, and some crackles at 

the left posterior lower base.  Patient continues on her home meds, home dose 

oral Lasix, nebulized treatments, meropenem, Xarelto, Symbicort, and her home 

dose prednisone.  She denies any chest pain, cardiology is following the 

patient regarding her elevated troponins.  No new labs today.  No acute events 

overnight, no chest congestion or significant sputum production. 





On 02/14/2018 patient seen again in follow-up.  Yesterday she was having 

hyperventilation, wheezing, shortness of breath, which was thought to be 

related to anxiety, patient was started on small dose of Xanax 0.25 mg 3 times 

daily.  This morning she is significantly confused, at times mumbling 

incoherently, at times yelling out, at times able to answer questions 

appropriately.  Lung sounds are diminished, with coarse rales over right 

posterior lower lobe, and scattered wheezing bilaterally.  She was found with 

her oxygen cannula off, and she was found to be hypoxic, with pulse ox at 84%, 

very confused and delirious.  There is no lateralizing deficits noted, patient'

s  are equal, face symmetric.  Lab work has been reviewed, BBC is 14.5, 

hemoglobin is stable at 8.1, CO2 was noted to be critically high at 41 from 

this morning's labs, BUN is 42, and creatinine is 1.43, which has increased 

from yesterday's 38 and 1.18 respectively.  Patient remains afebrile, she is 

still slightly tachycardic, but her heart rate is better controlled than 

yesterday, today it is around 113 BPM, sinus tachycardia.  Blood and sputum 

cultures showed no growth.  Patient remains on combination of Ceftin, 

prednisone 10 mg daily, Xarelto, nebulized treatments, Lasix of 40 by mouth 

twice a day and Symbicort.  Blood gas was obtained and reviewed, and it showed 

a pH of 7.51, pCO2 54, pO2 of 45, this was done on FiO2 of 32%.  This is 

consistent with metabolic acidosis and hypoxemia.  Patient will be placed on 

BiPAP support with pressures of 12/5, and FiO2 40%.  We will try to keep the O2 

saturations between 85-90%.  





On 02/15/2018 patient seen in follow-up.  Her mentation is slightly improved, 

she still mumbles incoherently at times, but she has not been yelling out and 

she appears to be more appropriate today.  Lung sounds are positive for 

scattered expiratory wheezes, but this appears to be improved from yesterday's 

exam.  She did wear the BiPAP last night, she is currently on 3 L per nasal 

cannula with O2 sat at 85%.  She is afebrile.  Sputum and blood cultures show 

no growth.  She remains on oral Ceftin, today's lab work shows increase in the 

WBCs up to 16.2, hemoglobin is stable at 8.3, serum sodium is 142, potassium is 

3.9, chloride is 93, CO2 is 41, BUN is 41, and creatinine is 1.58.  Patient's 

renal profile is steadily worsening.  Her oral intake has been poor, she is on 

oral Lasix at 40 mg twice a day, Diamox has been added per attending physician.

  





Patient was reevaluated today on 2/16/2018, feeling much better, breathing a 

lot easier, again I stressed the importance of having to go on a high dose of 

prednisone, and not to cut down on the prednisone unless approved by Dr. Enrique in the office.  Patient had previous diagnosis of cryptogenic 

organizing pneumonia, and she will probably be on prednisone for a long period 

of time, the dose is yet to be determined.  Her diagnosis was made previously 

by thoracoscopic lung biopsy.  Labs today were reviewed, WBC count of 16.4 

hemoglobin is 8.3.











Objective





- Vital Signs


Vital signs: 


 Vital Signs











Temp  98.5 F   02/16/18 07:00


 


Pulse  96   02/16/18 12:02


 


Resp  22   02/16/18 08:18


 


BP  135/76   02/16/18 07:00


 


Pulse Ox  93 L  02/15/18 22:22








 Intake & Output











 02/15/18 02/16/18 02/16/18





 18:59 06:59 18:59


 


Intake Total 1130 400 200


 


Balance 1130 400 200


 


Intake:   


 


  Oral 1130 400 200


 


Other:   


 


  Voiding Method  Bedpan Bedpan





   Incontinent


 


  # Voids 4 2 1


 


  # Bowel Movements  0 1














- Exam


Patient is awake, in no distress.  On nasal cannula at 3 L/m.  





HEENT examination is grossly unremarkable.  Mucous membranes are moist.  No 

oral lesions.





Neck supple.  Full range of motion.  No adenopathy thyromegaly or neck vein 

distention.





Cardiovascular examination reveals regular rhythm rate.  S1-S2 normal.  No S3 

or S4.  No discernible murmur noted.





Lungs reveal a scattered wheezes.  Breath sounds are equal bilaterally.  





Abdomen soft bowel sounds are heard.  No masses or tenderness.  Abdomen is 

obese.





Extremities are intact.  There is chronic venous stasis changes and some mild 

lower summary edema.  Skin on bilateral lower extremities is positive for some 

wrinkling 





Skin is without rash or lesion.





Neurologic examination no gross focal neurologic deficit





- Labs


CBC & Chem 7: 


 02/15/18 08:25





 02/15/18 08:25


Labs: 


 Abnormal Lab Results - Last 24 Hours (Table)











  02/15/18 02/15/18 02/16/18 Range/Units





  17:26 21:10 07:43 


 


POC Glucose (mg/dL)  234 H  216 H  148 H  (75-99)  mg/dL














  02/16/18 Range/Units





  11:50 


 


POC Glucose (mg/dL)  268 H  (75-99)  mg/dL








 Microbiology - Last 24 Hours (Table)











 02/11/18 08:00 Blood Culture - Preliminary





 Blood    No Growth after 120 hours














Assessment and Plan


Assessment: 





1.  Acute altered mental status, secondary to metabolic encephalopathy, improved


  


2. Acute on chronic hypoxic respiratory failure secondary to mild CHF, 

unspecified, COPD exacerbation, and bilateral pneumonia.  Patient has a history 

of bronchiolitis obliterans organizing pneumonia, and microcirculatory 

pulmonary arterial thrombus disease.  The patient's pretty much at baseline and 

looks about the same as she did on her last admission between February 6 and 

February 9.  I believe she was sent in because of her saturations in the mid to 

high 80s which is pretty much her baseline.





3.  Acute on chronic renal failure, possibly related to ATN. 





4.  Hypochloremic metabolic alkalosis, related to diuresis and decreased oral 

intake





5. Recent admission for COPD exacerbation complicated by purulent 

tracheobronchitis/bronchopneumonia involving the upper lobes





6. Biopsy-proven bronchiolitis obliterans organizing pneumonia





7. Microcirculatory pulmonary arteriopathy/thrombi





8. Obesity





9. Uterine cancer





10. Diabetes mellitus





11. Gastroesophageal reflux disease





12. Hypertension





13. Hyperlipidemia





14. Restless leg syndrome





15. Migraine cephalgia





16. Status post VATS lung biopsy





Recommendation: Continue present treatment plan, clear for discharge today, 

follow up on outpatient basis with Dr. Enrique.  Must remain on prednisone at 

30 mg daily until seen in the office.





Time with Patient: Less than 30

## 2018-02-16 NOTE — DS
DISCHARGE SUMMARY



DATE OF ADMISSION:

February 11, 2018.



DATE OF DISCHARGE:

February 16, 2018.



FINAL DIAGNOSES:

1. Acute bilateral pneumonia, probably gram-negative causing sepsis present on

    admission.

2. Acute exacerbation of biopsy-proven bronchiolitis obliterans organizing pneumonia.

3. Acute on chronic hypoxic and hypercapnic respiratory failure.

4. Acute delirium from hypoxia, hypercapnia, and pneumonia.

5. Chronic sigmoid diverticulosis.

6. Acute chronic obstructive pulmonary disease exacerbation in an ex-smoker.

7. Obesity hypoventilation syndrome.

8. Chronic cor pulmonale.

9. Diabetes mellitus type 2, chronically on insulin.

10.Biopsy confirmed thromboembolic lung disease with patient also on Xarelto.

11.Chronic low back pain.  Spinal stenosis.

12.Chronic bilateral lower extremity lymphedema.

13.Chronic urinary stress incontinence.

14.Gait dysfunction uses a walker.

15.Chronic lower extremity cellulitis.

16.Chronic restless leg syndrome.

17.History of splenectomy.



HOSPITAL COURSE:

This patient presented with worsening short of breath, cough, sepsis like picture,

felt to have of pneumonia.  Responded well to IV Zosyn.  Later switched over to Ceftin.

Also got underlying BOOP.  The patient is put on increased dose of prednisone.  He is

to remain on _____30 mg.  The patient doing much better with that.  The patient is back

to her baseline of 3 L of oxygen.  Patient also was delirious when she came in, which

is now resolved.  Tolerating diet.



EXAM:

LUNGS: Decreased breath sounds.  Mild wheezing.  Chronic lower extremity edema.  Psych

AO x3.



INVESTIGATIONS:

Accu-Cheks are noted.  White count 16.2, hemoglobin 8.3, BUN 41, creatinine 1.58.



ASSESSMENT:

1. Acute renal failure with element of prerenal.

2. Chronic kidney disease stage 3 from nephrosclerosis.



DISCHARGE MEDICATIONS:

1. Ventolin HFA 2 puffs q.4 p.r.n.

2. Tudorza 1 puff b.i.d.

3. Xalatan 0.005% 1 drop to both eyes q.h.s.

4. Xarelto 20 mg p.o. daily.

5. Iron 325 p.o. b.i.d.

6. Melatonin 5 mg q.h.s.

7. Potassium 10 mEq p.o. daily.

8. Requip 0.25 mg p.o. b.i.d.

9. Breo Ellipta 100/25 1 puff daily.

10.Humalog 5 units a.c. t.i.d.

11.Insulin Lantus _____15 units subcu q.h.s.

12.Vitamin D3 1000 units p.o. q.h.s.

13.Silvadene cream 1 application topical q.h.s.

14.Requip 0.5 p.o. q.h.s.

15.Pulmicort 1 mg p.o. b.i.d.

16.Ceftin 500 mg p.o. b.i.d.

17.Nexium 40 mg b.i.d.

18.DuoNeb q.6.

19.Bactroban 2% topical t.i.d.

20.Nitrostat 0.4 sublingual q.5 p.r.n.

21.Diamox 250 mg p.o. b.i.d. 10 tablets.

22.Prednisone 30 mg a day to be maintained.

23.Oxygen 3 L to be maintained.



DISPOSITION:

Critical access hospital/[facility].



Follow up with Dr. Hernández at the  Critical access hospital.  Follow up with Dr. Chino at the Critical access hospital.  Copy to

Dr. Hernández.





MMODL / IJN: [id number] / Job#: [id number]

## 2018-02-19 NOTE — PN
PROGRESS NOTE



ADDENDUM TO PROGRESS NOTE:



DATE OF SERVICE:

01/11/2018.



PHYSICAL EXAMINATION:

LUNGS: Decreased breath sounds.

PSYCH: Alert and oriented x3.

VITAL SIGNS: Noted.





MMODL / IJN: [id number] / Job#: [id number]

## 2018-02-20 ENCOUNTER — HOSPITAL ENCOUNTER (INPATIENT)
Dept: HOSPITAL 47 - EC | Age: 73
LOS: 6 days | Discharge: NURSING, REHAB OR LONG-TERM CARE | DRG: 871 | End: 2018-02-26
Payer: MEDICARE

## 2018-02-20 ENCOUNTER — HOSPITAL ENCOUNTER (EMERGENCY)
Dept: HOSPITAL 47 - EC | Age: 73
Discharge: HOME | End: 2018-02-20
Payer: MEDICARE

## 2018-02-20 VITALS — DIASTOLIC BLOOD PRESSURE: 54 MMHG | HEART RATE: 89 BPM | SYSTOLIC BLOOD PRESSURE: 114 MMHG | RESPIRATION RATE: 19 BRPM

## 2018-02-20 VITALS — TEMPERATURE: 97.9 F

## 2018-02-20 VITALS — BODY MASS INDEX: 42.7 KG/M2

## 2018-02-20 DIAGNOSIS — I27.81: ICD-10-CM

## 2018-02-20 DIAGNOSIS — R26.9: ICD-10-CM

## 2018-02-20 DIAGNOSIS — Z79.899: ICD-10-CM

## 2018-02-20 DIAGNOSIS — J44.9: ICD-10-CM

## 2018-02-20 DIAGNOSIS — Z82.5: ICD-10-CM

## 2018-02-20 DIAGNOSIS — Z86.14: ICD-10-CM

## 2018-02-20 DIAGNOSIS — D62: ICD-10-CM

## 2018-02-20 DIAGNOSIS — Z90.710: ICD-10-CM

## 2018-02-20 DIAGNOSIS — Z98.41: ICD-10-CM

## 2018-02-20 DIAGNOSIS — Z99.81: ICD-10-CM

## 2018-02-20 DIAGNOSIS — N39.3: ICD-10-CM

## 2018-02-20 DIAGNOSIS — I10: ICD-10-CM

## 2018-02-20 DIAGNOSIS — E11.22: ICD-10-CM

## 2018-02-20 DIAGNOSIS — Z79.52: ICD-10-CM

## 2018-02-20 DIAGNOSIS — Z87.01: ICD-10-CM

## 2018-02-20 DIAGNOSIS — I50.9: ICD-10-CM

## 2018-02-20 DIAGNOSIS — Z79.01: ICD-10-CM

## 2018-02-20 DIAGNOSIS — Z79.4: ICD-10-CM

## 2018-02-20 DIAGNOSIS — E11.9: ICD-10-CM

## 2018-02-20 DIAGNOSIS — K14.8: Primary | ICD-10-CM

## 2018-02-20 DIAGNOSIS — E78.5: ICD-10-CM

## 2018-02-20 DIAGNOSIS — Z85.42: ICD-10-CM

## 2018-02-20 DIAGNOSIS — K29.60: ICD-10-CM

## 2018-02-20 DIAGNOSIS — G89.29: ICD-10-CM

## 2018-02-20 DIAGNOSIS — J84.116: ICD-10-CM

## 2018-02-20 DIAGNOSIS — K44.9: ICD-10-CM

## 2018-02-20 DIAGNOSIS — G25.81: ICD-10-CM

## 2018-02-20 DIAGNOSIS — K64.8: ICD-10-CM

## 2018-02-20 DIAGNOSIS — K21.9: ICD-10-CM

## 2018-02-20 DIAGNOSIS — Z92.3: ICD-10-CM

## 2018-02-20 DIAGNOSIS — D50.0: ICD-10-CM

## 2018-02-20 DIAGNOSIS — L03.116: ICD-10-CM

## 2018-02-20 DIAGNOSIS — Z79.51: ICD-10-CM

## 2018-02-20 DIAGNOSIS — Z90.49: ICD-10-CM

## 2018-02-20 DIAGNOSIS — J96.22: ICD-10-CM

## 2018-02-20 DIAGNOSIS — R06.89: ICD-10-CM

## 2018-02-20 DIAGNOSIS — L03.115: ICD-10-CM

## 2018-02-20 DIAGNOSIS — I87.8: ICD-10-CM

## 2018-02-20 DIAGNOSIS — K26.4: ICD-10-CM

## 2018-02-20 DIAGNOSIS — N17.9: ICD-10-CM

## 2018-02-20 DIAGNOSIS — I13.0: ICD-10-CM

## 2018-02-20 DIAGNOSIS — Z86.711: ICD-10-CM

## 2018-02-20 DIAGNOSIS — I25.10: ICD-10-CM

## 2018-02-20 DIAGNOSIS — K29.80: ICD-10-CM

## 2018-02-20 DIAGNOSIS — Z98.42: ICD-10-CM

## 2018-02-20 DIAGNOSIS — G92: ICD-10-CM

## 2018-02-20 DIAGNOSIS — E66.2: ICD-10-CM

## 2018-02-20 DIAGNOSIS — Z87.891: ICD-10-CM

## 2018-02-20 DIAGNOSIS — J84.89: ICD-10-CM

## 2018-02-20 DIAGNOSIS — K57.30: ICD-10-CM

## 2018-02-20 DIAGNOSIS — R60.0: ICD-10-CM

## 2018-02-20 DIAGNOSIS — M48.00: ICD-10-CM

## 2018-02-20 DIAGNOSIS — I89.0: ICD-10-CM

## 2018-02-20 DIAGNOSIS — J96.21: ICD-10-CM

## 2018-02-20 DIAGNOSIS — E87.3: ICD-10-CM

## 2018-02-20 DIAGNOSIS — G43.909: ICD-10-CM

## 2018-02-20 DIAGNOSIS — N18.9: ICD-10-CM

## 2018-02-20 DIAGNOSIS — Z87.2: ICD-10-CM

## 2018-02-20 DIAGNOSIS — A41.9: Primary | ICD-10-CM

## 2018-02-20 DIAGNOSIS — Z90.81: ICD-10-CM

## 2018-02-20 DIAGNOSIS — E87.8: ICD-10-CM

## 2018-02-20 DIAGNOSIS — J44.1: ICD-10-CM

## 2018-02-20 LAB
ALBUMIN SERPL-MCNC: 2.7 G/DL (ref 3.5–5)
ALP SERPL-CCNC: 96 U/L (ref 38–126)
ALT SERPL-CCNC: 29 U/L (ref 9–52)
ANION GAP SERPL CALC-SCNC: 6 MMOL/L
AST SERPL-CCNC: 39 U/L (ref 14–36)
BUN SERPL-SCNC: 41 MG/DL (ref 7–17)
CALCIUM SPEC-MCNC: 8.1 MG/DL (ref 8.4–10.2)
CELLS COUNTED: 200
CHLORIDE SERPL-SCNC: 98 MMOL/L (ref 98–107)
CO2 SERPL-SCNC: 35 MMOL/L (ref 22–30)
EOSINOPHIL # BLD MANUAL: 0.34 K/UL (ref 0–0.7)
ERYTHROCYTE [DISTWIDTH] IN BLOOD BY AUTOMATED COUNT: 2.28 M/UL (ref 3.8–5.4)
ERYTHROCYTE [DISTWIDTH] IN BLOOD: 19.8 % (ref 11.5–15.5)
GLUCOSE BLD-MCNC: 113 MG/DL (ref 75–99)
GLUCOSE SERPL-MCNC: 71 MG/DL (ref 74–99)
HCT VFR BLD AUTO: 21.3 % (ref 34–46)
HGB BLD-MCNC: 6.4 GM/DL (ref 11.4–16)
LYMPHOCYTES # BLD MANUAL: 2.91 K/UL (ref 1–4.8)
MAGNESIUM SPEC-SCNC: 2.1 MG/DL (ref 1.6–2.3)
MCH RBC QN AUTO: 28.3 PG (ref 25–35)
MCHC RBC AUTO-ENTMCNC: 30.2 G/DL (ref 31–37)
MCV RBC AUTO: 93.5 FL (ref 80–100)
MONOCYTES # BLD MANUAL: 1.37 K/UL (ref 0–1)
MYELOCYTES # BLD MANUAL: 0.34 K/UL
NEUTROPHILS NFR BLD MANUAL: 68 %
NEUTS SEG # BLD MANUAL: 12.4 K/UL (ref 1.3–7.7)
PH UR: 5.5 [PH] (ref 5–8)
PLATELET # BLD AUTO: 447 K/UL (ref 150–450)
POTASSIUM SERPL-SCNC: 3.7 MMOL/L (ref 3.5–5.1)
PROT SERPL-MCNC: 5.7 G/DL (ref 6.3–8.2)
SODIUM SERPL-SCNC: 139 MMOL/L (ref 137–145)
SP GR UR: 1.01 (ref 1–1.03)
UROBILINOGEN UR QL STRIP: <2 MG/DL (ref ?–2)
WBC # BLD AUTO: 17.1 K/UL (ref 3.8–10.6)

## 2018-02-20 PROCEDURE — 86900 BLOOD TYPING SEROLOGIC ABO: CPT

## 2018-02-20 PROCEDURE — 36415 COLL VENOUS BLD VENIPUNCTURE: CPT

## 2018-02-20 PROCEDURE — 43239 EGD BIOPSY SINGLE/MULTIPLE: CPT

## 2018-02-20 PROCEDURE — 80048 BASIC METABOLIC PNL TOTAL CA: CPT

## 2018-02-20 PROCEDURE — 81003 URINALYSIS AUTO W/O SCOPE: CPT

## 2018-02-20 PROCEDURE — 86920 COMPATIBILITY TEST SPIN: CPT

## 2018-02-20 PROCEDURE — 87086 URINE CULTURE/COLONY COUNT: CPT

## 2018-02-20 PROCEDURE — 80053 COMPREHEN METABOLIC PANEL: CPT

## 2018-02-20 PROCEDURE — 83735 ASSAY OF MAGNESIUM: CPT

## 2018-02-20 PROCEDURE — 99284 EMERGENCY DEPT VISIT MOD MDM: CPT

## 2018-02-20 PROCEDURE — 94760 N-INVAS EAR/PLS OXIMETRY 1: CPT

## 2018-02-20 PROCEDURE — 84100 ASSAY OF PHOSPHORUS: CPT

## 2018-02-20 PROCEDURE — 87040 BLOOD CULTURE FOR BACTERIA: CPT

## 2018-02-20 PROCEDURE — 86850 RBC ANTIBODY SCREEN: CPT

## 2018-02-20 PROCEDURE — 85027 COMPLETE CBC AUTOMATED: CPT

## 2018-02-20 PROCEDURE — 71045 X-RAY EXAM CHEST 1 VIEW: CPT

## 2018-02-20 PROCEDURE — 87502 INFLUENZA DNA AMP PROBE: CPT

## 2018-02-20 PROCEDURE — 86901 BLOOD TYPING SEROLOGIC RH(D): CPT

## 2018-02-20 PROCEDURE — 93005 ELECTROCARDIOGRAM TRACING: CPT

## 2018-02-20 PROCEDURE — 83605 ASSAY OF LACTIC ACID: CPT

## 2018-02-20 PROCEDURE — 71046 X-RAY EXAM CHEST 2 VIEWS: CPT

## 2018-02-20 PROCEDURE — 94640 AIRWAY INHALATION TREATMENT: CPT

## 2018-02-20 PROCEDURE — 88305 TISSUE EXAM BY PATHOLOGIST: CPT

## 2018-02-20 PROCEDURE — 85025 COMPLETE CBC W/AUTO DIFF WBC: CPT

## 2018-02-20 RX ADMIN — CEFAZOLIN SCH MLS/HR: 330 INJECTION, POWDER, FOR SOLUTION INTRAMUSCULAR; INTRAVENOUS at 23:45
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
## 2018-02-20 NOTE — XR
EXAMINATION TYPE: XR chest 2V

 

DATE OF EXAM: 2/20/2018

 

COMPARISON: Prior chest x-ray 2/15/2018

 

HISTORY: Shortness of breath

 

TECHNIQUE:  Frontal and lateral views of the chest are obtained.

 

FINDINGS:  The interstitial pattern seen on prior exam is again noted. There may be some

 

Groundglass opacity especially in the right upper lobe. Pulmonary artery is likely prominent, correla
te for possible pulmonary artery hypertension. Heart is stable. No pneumothorax or pleural effusion.

 

IMPRESSION:  Interstitial lung disease and additional findings above, difficult to exclude an upper l
obe pneumonia versus alveolitis.

## 2018-02-21 LAB
ANION GAP SERPL CALC-SCNC: 6 MMOL/L
BUN SERPL-SCNC: 37 MG/DL (ref 7–17)
CALCIUM SPEC-MCNC: 8.2 MG/DL (ref 8.4–10.2)
CELLS COUNTED: 200
CHLORIDE SERPL-SCNC: 99 MMOL/L (ref 98–107)
CO2 SERPL-SCNC: 34 MMOL/L (ref 22–30)
EOSINOPHIL # BLD MANUAL: 0.62 K/UL (ref 0–0.7)
ERYTHROCYTE [DISTWIDTH] IN BLOOD BY AUTOMATED COUNT: 3.06 M/UL (ref 3.8–5.4)
ERYTHROCYTE [DISTWIDTH] IN BLOOD: 19 % (ref 11.5–15.5)
GLUCOSE BLD-MCNC: 105 MG/DL (ref 75–99)
GLUCOSE BLD-MCNC: 191 MG/DL (ref 75–99)
GLUCOSE BLD-MCNC: 280 MG/DL (ref 75–99)
GLUCOSE BLD-MCNC: 281 MG/DL (ref 75–99)
GLUCOSE SERPL-MCNC: 98 MG/DL (ref 74–99)
HCT VFR BLD AUTO: 29.8 % (ref 34–46)
HGB BLD-MCNC: 8.6 GM/DL (ref 11.4–16)
LYMPHOCYTES # BLD MANUAL: 4.47 K/UL (ref 1–4.8)
MAGNESIUM SPEC-SCNC: 2 MG/DL (ref 1.6–2.3)
MCH RBC QN AUTO: 28.2 PG (ref 25–35)
MCHC RBC AUTO-ENTMCNC: 28.9 G/DL (ref 31–37)
MCV RBC AUTO: 97.3 FL (ref 80–100)
METAMYELOCYTES # BLD: 0.15 K/UL
MONOCYTES # BLD MANUAL: 0.46 K/UL (ref 0–1)
NEUTROPHILS NFR BLD MANUAL: 64 %
NEUTS SEG # BLD MANUAL: 10 K/UL (ref 1.3–7.7)
PLATELET # BLD AUTO: 408 K/UL (ref 150–450)
POTASSIUM SERPL-SCNC: 3.9 MMOL/L (ref 3.5–5.1)
SODIUM SERPL-SCNC: 139 MMOL/L (ref 137–145)
WBC # BLD AUTO: 15.4 K/UL (ref 3.8–10.6)

## 2018-02-21 PROCEDURE — 30233N1 TRANSFUSION OF NONAUTOLOGOUS RED BLOOD CELLS INTO PERIPHERAL VEIN, PERCUTANEOUS APPROACH: ICD-10-PCS

## 2018-02-21 RX ADMIN — IPRATROPIUM BROMIDE AND ALBUTEROL SULFATE PRN ML: .5; 3 SOLUTION RESPIRATORY (INHALATION) at 02:20

## 2018-02-21 RX ADMIN — IPRATROPIUM BROMIDE AND ALBUTEROL SULFATE SCH ML: .5; 3 SOLUTION RESPIRATORY (INHALATION) at 16:29

## 2018-02-21 RX ADMIN — DEXTROSE MONOHYDRATE SCH MLS/HR: 5 INJECTION, SOLUTION INTRAVENOUS at 08:18

## 2018-02-21 RX ADMIN — METHYLPREDNISOLONE SODIUM SUCCINATE SCH MG: 125 INJECTION, POWDER, FOR SOLUTION INTRAMUSCULAR; INTRAVENOUS at 12:19

## 2018-02-21 RX ADMIN — BUDESONIDE SCH MG: 1 SUSPENSION RESPIRATORY (INHALATION) at 20:48

## 2018-02-21 RX ADMIN — PANTOPRAZOLE SODIUM SCH MG: 40 INJECTION, POWDER, FOR SOLUTION INTRAVENOUS at 20:50

## 2018-02-21 RX ADMIN — METHYLPREDNISOLONE SODIUM SUCCINATE SCH MG: 125 INJECTION, POWDER, FOR SOLUTION INTRAMUSCULAR; INTRAVENOUS at 23:30

## 2018-02-21 RX ADMIN — IPRATROPIUM BROMIDE AND ALBUTEROL SULFATE SCH: .5; 3 SOLUTION RESPIRATORY (INHALATION) at 13:56

## 2018-02-21 RX ADMIN — INSULIN ASPART SCH: 100 INJECTION, SOLUTION INTRAVENOUS; SUBCUTANEOUS at 08:17

## 2018-02-21 RX ADMIN — INSULIN ASPART SCH UNIT: 100 INJECTION, SOLUTION INTRAVENOUS; SUBCUTANEOUS at 17:43

## 2018-02-21 RX ADMIN — METHYLPREDNISOLONE SODIUM SUCCINATE SCH MG: 125 INJECTION, POWDER, FOR SOLUTION INTRAMUSCULAR; INTRAVENOUS at 17:42

## 2018-02-21 RX ADMIN — INSULIN DETEMIR SCH UNIT: 100 INJECTION, SOLUTION SUBCUTANEOUS at 20:56

## 2018-02-21 RX ADMIN — IPRATROPIUM BROMIDE AND ALBUTEROL SULFATE SCH ML: .5; 3 SOLUTION RESPIRATORY (INHALATION) at 20:48

## 2018-02-21 RX ADMIN — INSULIN ASPART SCH UNIT: 100 INJECTION, SOLUTION INTRAVENOUS; SUBCUTANEOUS at 12:19

## 2018-02-21 RX ADMIN — DEXTROSE MONOHYDRATE SCH MLS/HR: 5 INJECTION, SOLUTION INTRAVENOUS at 23:30

## 2018-02-21 RX ADMIN — INSULIN ASPART SCH UNIT: 100 INJECTION, SOLUTION INTRAVENOUS; SUBCUTANEOUS at 20:56

## 2018-02-21 RX ADMIN — CEFAZOLIN SCH MLS/HR: 330 INJECTION, POWDER, FOR SOLUTION INTRAMUSCULAR; INTRAVENOUS at 17:43

## 2018-02-21 RX ADMIN — MUPIROCIN SCH APPLIC: 20 OINTMENT TOPICAL at 20:57

## 2018-02-21 RX ADMIN — LEVOFLOXACIN SCH MLS/HR: 750 INJECTION, SOLUTION INTRAVENOUS at 20:56

## 2018-02-21 RX ADMIN — CEFAZOLIN SCH MLS/HR: 330 INJECTION, POWDER, FOR SOLUTION INTRAMUSCULAR; INTRAVENOUS at 11:25

## 2018-02-21 RX ADMIN — METHYLPREDNISOLONE SODIUM SUCCINATE SCH MG: 125 INJECTION, POWDER, FOR SOLUTION INTRAMUSCULAR; INTRAVENOUS at 08:55

## 2018-02-21 RX ADMIN — IPRATROPIUM BROMIDE AND ALBUTEROL SULFATE SCH ML: .5; 3 SOLUTION RESPIRATORY (INHALATION) at 08:46

## 2018-02-21 RX ADMIN — DEXTROSE MONOHYDRATE SCH MLS/HR: 5 INJECTION, SOLUTION INTRAVENOUS at 16:18

## 2018-02-21 RX ADMIN — LATANOPROST SCH DROPS: 50 SOLUTION OPHTHALMIC at 20:57
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
## 2018-02-21 NOTE — XR
EXAMINATION TYPE: XR chest 1V portable

 

DATE OF EXAM: 2/21/2018

 

Comparison: 2/20/2018

 

Clinical History: 72-year-old female sob

 

Findings:

Heart upper limits of normal in size. Mild elongation of the thoracic aorta. Interstitial densities s
o some improvement suggested in the peripheral left upper lobe by more patchy opacity persists in the
 right upper lung and left base. No significant pleural effusion seen on the lateral view.

 

 

Impression:

Atypical pulmonary edema versus multifocal pneumonia or other pneumonitis. Minimal improvement in int
erstitial infiltrate in the left upper lobe. Patchy airspace disease persists in the right upper lobe
 and left base.

## 2018-02-22 LAB
ANION GAP SERPL CALC-SCNC: 10 MMOL/L
BUN SERPL-SCNC: 31 MG/DL (ref 7–17)
CALCIUM SPEC-MCNC: 8.2 MG/DL (ref 8.4–10.2)
CHLORIDE SERPL-SCNC: 103 MMOL/L (ref 98–107)
CO2 SERPL-SCNC: 29 MMOL/L (ref 22–30)
ERYTHROCYTE [DISTWIDTH] IN BLOOD BY AUTOMATED COUNT: 2.92 M/UL (ref 3.8–5.4)
ERYTHROCYTE [DISTWIDTH] IN BLOOD: 19.2 % (ref 11.5–15.5)
GLUCOSE BLD-MCNC: 250 MG/DL (ref 75–99)
GLUCOSE BLD-MCNC: 254 MG/DL (ref 75–99)
GLUCOSE BLD-MCNC: 261 MG/DL (ref 75–99)
GLUCOSE BLD-MCNC: 351 MG/DL (ref 75–99)
GLUCOSE SERPL-MCNC: 274 MG/DL (ref 74–99)
HCT VFR BLD AUTO: 28.2 % (ref 34–46)
HGB BLD-MCNC: 8.4 GM/DL (ref 11.4–16)
MCH RBC QN AUTO: 28.7 PG (ref 25–35)
MCHC RBC AUTO-ENTMCNC: 29.8 G/DL (ref 31–37)
MCV RBC AUTO: 96.4 FL (ref 80–100)
PLATELET # BLD AUTO: 408 K/UL (ref 150–450)
POTASSIUM SERPL-SCNC: 3.3 MMOL/L (ref 3.5–5.1)
SODIUM SERPL-SCNC: 142 MMOL/L (ref 137–145)
WBC # BLD AUTO: 11.2 K/UL (ref 3.8–10.6)

## 2018-02-22 RX ADMIN — BUDESONIDE SCH: 1 SUSPENSION RESPIRATORY (INHALATION) at 21:10

## 2018-02-22 RX ADMIN — DEXTROSE MONOHYDRATE SCH MLS/HR: 5 INJECTION, SOLUTION INTRAVENOUS at 15:55

## 2018-02-22 RX ADMIN — INSULIN ASPART SCH UNIT: 100 INJECTION, SOLUTION INTRAVENOUS; SUBCUTANEOUS at 21:10

## 2018-02-22 RX ADMIN — IPRATROPIUM BROMIDE AND ALBUTEROL SULFATE SCH: .5; 3 SOLUTION RESPIRATORY (INHALATION) at 15:40

## 2018-02-22 RX ADMIN — IPRATROPIUM BROMIDE AND ALBUTEROL SULFATE SCH: .5; 3 SOLUTION RESPIRATORY (INHALATION) at 11:44

## 2018-02-22 RX ADMIN — LATANOPROST SCH DROPS: 50 SOLUTION OPHTHALMIC at 21:10

## 2018-02-22 RX ADMIN — METHYLPREDNISOLONE SODIUM SUCCINATE SCH MG: 125 INJECTION, POWDER, FOR SOLUTION INTRAMUSCULAR; INTRAVENOUS at 06:46

## 2018-02-22 RX ADMIN — IPRATROPIUM BROMIDE AND ALBUTEROL SULFATE PRN ML: .5; 3 SOLUTION RESPIRATORY (INHALATION) at 03:48

## 2018-02-22 RX ADMIN — PANTOPRAZOLE SODIUM SCH MG: 40 INJECTION, POWDER, FOR SOLUTION INTRAVENOUS at 08:45

## 2018-02-22 RX ADMIN — MUPIROCIN SCH APPLIC: 20 OINTMENT TOPICAL at 21:11

## 2018-02-22 RX ADMIN — BUDESONIDE SCH MG: 1 SUSPENSION RESPIRATORY (INHALATION) at 07:52

## 2018-02-22 RX ADMIN — METHYLPREDNISOLONE SODIUM SUCCINATE SCH MG: 125 INJECTION, POWDER, FOR SOLUTION INTRAMUSCULAR; INTRAVENOUS at 17:34

## 2018-02-22 RX ADMIN — MUPIROCIN SCH APPLIC: 20 OINTMENT TOPICAL at 08:45

## 2018-02-22 RX ADMIN — INSULIN ASPART SCH UNIT: 100 INJECTION, SOLUTION INTRAVENOUS; SUBCUTANEOUS at 17:35

## 2018-02-22 RX ADMIN — PANTOPRAZOLE SODIUM SCH MG: 40 INJECTION, POWDER, FOR SOLUTION INTRAVENOUS at 21:12

## 2018-02-22 RX ADMIN — CEFAZOLIN SCH MLS/HR: 330 INJECTION, POWDER, FOR SOLUTION INTRAMUSCULAR; INTRAVENOUS at 21:37

## 2018-02-22 RX ADMIN — IPRATROPIUM BROMIDE AND ALBUTEROL SULFATE SCH ML: .5; 3 SOLUTION RESPIRATORY (INHALATION) at 07:52

## 2018-02-22 RX ADMIN — IPRATROPIUM BROMIDE AND ALBUTEROL SULFATE SCH: .5; 3 SOLUTION RESPIRATORY (INHALATION) at 21:10

## 2018-02-22 RX ADMIN — INSULIN ASPART SCH UNIT: 100 INJECTION, SOLUTION INTRAVENOUS; SUBCUTANEOUS at 12:34

## 2018-02-22 RX ADMIN — CEFAZOLIN SCH MLS/HR: 330 INJECTION, POWDER, FOR SOLUTION INTRAMUSCULAR; INTRAVENOUS at 15:55

## 2018-02-22 RX ADMIN — INSULIN ASPART SCH UNIT: 100 INJECTION, SOLUTION INTRAVENOUS; SUBCUTANEOUS at 06:47

## 2018-02-22 RX ADMIN — DEXTROSE MONOHYDRATE SCH MLS/HR: 5 INJECTION, SOLUTION INTRAVENOUS at 08:44

## 2018-02-22 RX ADMIN — CEFAZOLIN SCH MLS/HR: 330 INJECTION, POWDER, FOR SOLUTION INTRAMUSCULAR; INTRAVENOUS at 06:46

## 2018-02-22 RX ADMIN — INSULIN DETEMIR SCH UNIT: 100 INJECTION, SOLUTION SUBCUTANEOUS at 21:11

## 2018-02-22 RX ADMIN — MUPIROCIN SCH APPLIC: 20 OINTMENT TOPICAL at 12:34

## 2018-02-22 RX ADMIN — INSULIN ASPART SCH UNIT: 100 INJECTION, SOLUTION INTRAVENOUS; SUBCUTANEOUS at 08:44

## 2018-02-22 RX ADMIN — METHYLPREDNISOLONE SODIUM SUCCINATE SCH MG: 125 INJECTION, POWDER, FOR SOLUTION INTRAMUSCULAR; INTRAVENOUS at 12:34

## 2018-02-22 NOTE — P.PN
Subjective


Progress Note Date: 02/22/18


Principal diagnosis: 





Acute altered mental status secondary to metabolic encephalopathy.





This is a 72-year-old female with history of advanced COPD, O2 dependent, 

history of biopsy-proven bronchiolitis obliterans with organizing pneumonia/

cryptogenic organizing pneumonia, history of morbid obesity, patient is 

normally on prednisone for her bronchiolitis obliterans with organizing 

pneumonia.  Patient was recently in the hospital for multiple pulmonary 

symptoms including shortness of breath, cough wheezing, and altered mental 

status secondary to metabolic encephalopathy.  During her last admission which 

was only a week ago, patient had acute on chronic hypoxic respiratory failure, 

mild congestive heart failure, and bilateral pneumonia.  She also had acute on 

chronic renal failure, hyperchloremic metabolic alkalosis, and underlying COPD.

  The patient was discharged to rehab facility on 2/16/2018, and she is back 

here today complaining of similar presentation mostly fever, mental status 

change, and confusion, and intermittent cough wheezing shortness of breath.  

Chest x-ray actually is improved compared to the chest x-ray on the last 

admission, it did show multifocal pneumonia mostly in the upper lobes, and mild 

interstitial changes bilaterally suspicious for atypical pulmonary edema.  

Patchy airspace disease was noted in the right upper lobe and in the left 

midlung.  Considering the findings on the chest x-ray, I was asked to see the 

patient on consultation.  Patient is a poor historian, she has no clue why she 

was admitted to the hospital, seems to be a bit confused.  CBC showed a bit of 

leukocytosis with WBC count of 15.4, hemoglobin is 8.6, however her admission 

hemoglobin was 6.4, the patient received already 1 unit of packed RBCs.  Final 

screen was negative.





The patient is seen again today 02/22/2018 in follow-up on the selective care 

unit.  She is awake and alert in no acute distress.  She is oriented 3.  She 

is currently sitting up in a chair at the bedside.  She is maintaining good O2 

saturations in the 90s on 4 L/m per nasal cannula.  She's been afebrile.  

Hemodynamically stable.  Blood culture reveals no growth.  Urine culture 

reveals no growth.  White count 11.2.  Hemoglobin 8.4.  Creatinine 1.36.  GI 

services on the case regarding her anemia and possible EGD planned to rule out 

peptic ulcer disease.  Anticoagulations is on hold.





Objective





- Vital Signs


Vital signs: 


 Vital Signs











Temp  97.0 F L  02/22/18 12:00


 


Pulse  81   02/22/18 12:00


 


Resp  22   02/22/18 12:00


 


BP  128/60   02/22/18 12:00


 


Pulse Ox  98   02/22/18 12:00








 Intake & Output











 02/21/18 02/22/18 02/22/18





 18:59 06:59 18:59


 


Intake Total 1132.0  60


 


Balance 1132.0  60


 


Weight  132 kg 


 


Intake:   


 


  .0  


 


    Sodium Chloride 0.9% 1, 500  





    000 ml @ 100 mls/hr IV .   





    Q10H [facility] Rx#:[id number]   


 


    zosyn 50.0  


 


  Oral 582  60


 


Other:   


 


  Voiding Method Diaper Diaper Diaper





 Incontinent Incontinent Incontinent


 


  # Voids 1  2














- Exam








Patient is awake, in no distress.  On nasal cannula 4 L nasal cannula 





HEENT examination is grossly unremarkable.  Mucous membranes are moist.  No 

oral lesions.





Neck supple.  Full range of motion.  No adenopathy thyromegaly or neck vein 

distention.





Cardiovascular examination reveals regular rhythm rate.  S1-S2 normal.  No S3 

or S4.  No discernible murmur noted.





Lungs reveal a scattered wheezes.  Breath sounds are equal bilaterally.  





Abdomen soft bowel sounds are heard.  No masses or tenderness.  Abdomen is 

obese.





Extremities are intact.  There is chronic venous stasis changes and some mild 

lower summary edema. 





Skin is without rash or lesion.





Neurologic examination revealed 72-year-old slightly confused, unaware why she 

is in the hospital, otherwise no gross focal neurologic deficit





Lymphatics: No lymphadenopathy





Psychiatric: Blunted mood and affect, poor judgment and insight at this point.





- Labs


CBC & Chem 7: 


 02/22/18 08:23





 02/22/18 08:23


Labs: 


 Abnormal Lab Results - Last 24 Hours (Table)











  02/21/18 02/21/18 02/22/18 Range/Units





  16:24 20:36 05:59 


 


WBC     (3.8-10.6)  k/uL


 


RBC     (3.80-5.40)  m/uL


 


Hgb     (11.4-16.0)  gm/dL


 


Hct     (34.0-46.0)  %


 


MCHC     (31.0-37.0)  g/dL


 


RDW     (11.5-15.5)  %


 


Potassium     (3.5-5.1)  mmol/L


 


BUN     (7-17)  mg/dL


 


Creatinine     (0.52-1.04)  mg/dL


 


Glucose     (74-99)  mg/dL


 


POC Glucose (mg/dL)  280 H  281 H  250 H  (75-99)  mg/dL


 


Calcium     (8.4-10.2)  mg/dL














  02/22/18 02/22/18 02/22/18 Range/Units





  08:23 08:23 12:27 


 


WBC  11.2 H    (3.8-10.6)  k/uL


 


RBC  2.92 L    (3.80-5.40)  m/uL


 


Hgb  8.4 L    (11.4-16.0)  gm/dL


 


Hct  28.2 L    (34.0-46.0)  %


 


MCHC  29.8 L    (31.0-37.0)  g/dL


 


RDW  19.2 H    (11.5-15.5)  %


 


Potassium   3.3 L   (3.5-5.1)  mmol/L


 


BUN   31 H   (7-17)  mg/dL


 


Creatinine   1.36 H   (0.52-1.04)  mg/dL


 


Glucose   274 H   (74-99)  mg/dL


 


POC Glucose (mg/dL)    590 H  (75-99)  mg/dL


 


Calcium   8.2 L   (8.4-10.2)  mg/dL














  02/22/18 Range/Units





  12:29 


 


WBC   (3.8-10.6)  k/uL


 


RBC   (3.80-5.40)  m/uL


 


Hgb   (11.4-16.0)  gm/dL


 


Hct   (34.0-46.0)  %


 


MCHC   (31.0-37.0)  g/dL


 


RDW   (11.5-15.5)  %


 


Potassium   (3.5-5.1)  mmol/L


 


BUN   (7-17)  mg/dL


 


Creatinine   (0.52-1.04)  mg/dL


 


Glucose   (74-99)  mg/dL


 


POC Glucose (mg/dL)  351 H  (75-99)  mg/dL


 


Calcium   (8.4-10.2)  mg/dL








 Microbiology - Last 24 Hours (Table)











 02/20/18 22:51 Urine Culture - Final





 Urine,Catheterized 


 


 02/20/18 21:30 Blood Culture - Preliminary





 Blood    No Growth after 24 hours














Assessment and Plan


Assessment: 





Impression:





1.  Acute altered mental status, secondary to metabolic encephalopathy.  

Recovered.





2. Acute on chronic hypoxic respiratory failure , multifactorial secondary to 

mild congestive heart failure, COPD, and bronchiolitis obliterans with 

organizing pneumonia/boop/cryptogenic organizing pneumonia.





3.  Acute on chronic anemia most likely secondary to GI blood losses secondary 

to Xarelto.  Xarelto currently on hold.  Plans for possible EGD.





4 Recent admission for COPD exacerbation complicated by purulent 

tracheobronchitis/bronchopneumonia involving the upper lobes





5 Biopsy-proven bronchiolitis obliterans organizing pneumonia





7. Microcirculatory pulmonary arteriopathy/thrombi, documented on previous 

scanning of the chest, clinical significance of which is not clear at this point

, but Xarelto will be placed on hold.





8. Obesity





9. Uterine cancer





10. Diabetes mellitus





11. Gastroesophageal reflux disease





12. Hypertension





13. Hyperlipidemia





14. Restless leg syndrome





15. Migraine cephalgia





16. Status post VATS lung biopsy





17 possible underlying healthcare acquired pneumonia.  However I believe the 

findings on the chest are mostly related to bronchiolitis obliterans with 

organizing pneumonia.





Thank you:





The patient was seen and evaluated by Dr. Kaur.  She is currently stable from 

the pulmonary standpoint.  We'll continue with bronchodilators, Solu-Medrol, 

antibiotics, and antibiotics.  GI has been consulted our plan and possible EGD.

  We'll continue to follow.





I, the cosigning physician, performed a history & physical examination of the 

patient. Lungs sounds coarse crackles in the bilateral posterior bases.  

Maintaining good O2 saturations in the 90s on 4 L/m per nasal cannula.  I 

discussed the assessment and plan of care with my nurse practitioner, Veronica Gao. I attest to the above note as dictated by her.

## 2018-02-22 NOTE — P.PN
Subjective


Patient was admitted secondary to respiratory failure which is believed 

secondary to BOOP.  Patient has significant improvement patient is presently on 

4 L of onset and saturating well no GI bleed at this time hemoglobin remained 

stable patient will undergo EGD anti-correlation is on hold because of drop in 

hemoglobin which is probably subacute loss of blood 0r a slow GI bleed.  

Patient is much less confused today.





Constitutional: Denied any fatigue denied any fever.


Cardio vascular: denied any chest pain, palpitations


Gastrointestinal denied any nausea vomiting


Pulmonary: Denied any shortness of breath cough


Neurologic denied any new focal deficits





Objective





- Vital Signs


Vital signs: 


 Vital Signs











Temp  97.0 F L  02/22/18 12:00


 


Pulse  81   02/22/18 12:00


 


Resp  22   02/22/18 12:00


 


BP  128/60   02/22/18 12:00


 


Pulse Ox  98   02/22/18 12:00








 Intake & Output











 02/21/18 02/22/18 02/22/18





 18:59 06:59 18:59


 


Intake Total 1132.0  60


 


Balance 1132.0  60


 


Weight  132 kg 


 


Intake:   


 


  .0  


 


    Sodium Chloride 0.9% 1, 500  





    000 ml @ 100 mls/hr IV .   





    Q10H [facility] Rx#:[id number]   


 


    zosyn 50.0  


 


  Oral 582  60


 


Other:   


 


  Voiding Method Diaper Diaper Diaper





 Incontinent Incontinent Incontinent


 


  # Voids 1  2














- Exam


PHYSICAL EXAMINATION: 





GENERAL: The patient is alert and oriented 2-3, he is in mild respiratory 

distress using accessory muscles of breathing, moderately obese


HEENT: Pupils are round and equally reacting to light. EOMI. No scleral 

icterus. No conjunctival pallor. Normocephalic, atraumatic. No pharyngeal 

erythema. No thyromegaly. 


CARDIOVASCULAR: S1 and S2 present. No murmurs, rubs, or gallops. 


PULMONARY: Fairly good air entry bilateral lung fields mild expiratory wheezing 

was appreciated


ABDOMEN: Soft, nontender, nondistended, normoactive bowel sounds. No palpable 

organomegaly. 


MUSCULOSKELETAL: No joint swelling or deformity.


EXTREMITIES: No cyanosis, clubbing, or pedal edema. 


NEUROLOGICAL: Gross neurological examination did not reveal any focal deficits. 


SKIN: No rashes. 








- Labs


CBC & Chem 7: 


 02/22/18 08:23





 02/22/18 08:23


Labs: 


 Abnormal Lab Results - Last 24 Hours (Table)











  02/21/18 02/21/18 02/22/18 Range/Units





  16:24 20:36 05:59 


 


WBC     (3.8-10.6)  k/uL


 


RBC     (3.80-5.40)  m/uL


 


Hgb     (11.4-16.0)  gm/dL


 


Hct     (34.0-46.0)  %


 


MCHC     (31.0-37.0)  g/dL


 


RDW     (11.5-15.5)  %


 


Potassium     (3.5-5.1)  mmol/L


 


BUN     (7-17)  mg/dL


 


Creatinine     (0.52-1.04)  mg/dL


 


Glucose     (74-99)  mg/dL


 


POC Glucose (mg/dL)  280 H  281 H  250 H  (75-99)  mg/dL


 


Calcium     (8.4-10.2)  mg/dL














  02/22/18 02/22/18 02/22/18 Range/Units





  08:23 08:23 12:27 


 


WBC  11.2 H    (3.8-10.6)  k/uL


 


RBC  2.92 L    (3.80-5.40)  m/uL


 


Hgb  8.4 L    (11.4-16.0)  gm/dL


 


Hct  28.2 L    (34.0-46.0)  %


 


MCHC  29.8 L    (31.0-37.0)  g/dL


 


RDW  19.2 H    (11.5-15.5)  %


 


Potassium   3.3 L   (3.5-5.1)  mmol/L


 


BUN   31 H   (7-17)  mg/dL


 


Creatinine   1.36 H   (0.52-1.04)  mg/dL


 


Glucose   274 H   (74-99)  mg/dL


 


POC Glucose (mg/dL)    590 H  (75-99)  mg/dL


 


Calcium   8.2 L   (8.4-10.2)  mg/dL














  02/22/18 Range/Units





  12:29 


 


WBC   (3.8-10.6)  k/uL


 


RBC   (3.80-5.40)  m/uL


 


Hgb   (11.4-16.0)  gm/dL


 


Hct   (34.0-46.0)  %


 


MCHC   (31.0-37.0)  g/dL


 


RDW   (11.5-15.5)  %


 


Potassium   (3.5-5.1)  mmol/L


 


BUN   (7-17)  mg/dL


 


Creatinine   (0.52-1.04)  mg/dL


 


Glucose   (74-99)  mg/dL


 


POC Glucose (mg/dL)  351 H  (75-99)  mg/dL


 


Calcium   (8.4-10.2)  mg/dL








 Microbiology - Last 24 Hours (Table)











 02/20/18 22:51 Urine Culture - Final





 Urine,Catheterized 


 


 02/20/18 21:30 Blood Culture - Preliminary





 Blood    No Growth after 24 hours














Assessment and Plan


Plan: 


-Sepsis: Possible source being bilateral bronchopneumonia patient is on broad 

symptom antibiotics Zosyn and vancomycin.


-Altered mental status: Secondary to toxic and metabolic encephalopathy from 

hypercapnia and sepsis.


-Acute on chronic hypoxic and hypercapnic respiratory failure: Secondary to 

bronchogenic organizing  pneumonia and COPD with acute exacerbation.


-History of recent pulmonary embolism with microemboli for which patient is on 

anticoagulation which is being held because of drop in hemoglobin and 

possibility of GI bleed.


-Acute subacute blood loss anemia: Rule out GI bleed gastric body was consulted 

monitor closely patient is status post 1 unit of blood transfusion.


 Obesity patient undergo upper GI endoscopy





-Type II Diabetes mellitus


-Gastroesophageal reflux disease


-Hypertension


- Hyperlipidemia


- Restless leg syndrome


- Migraine cephalgia


-Status post VATS lung biopsy


-possible underlying healthcare acquired pneumonia.
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
## 2018-02-23 LAB
GLUCOSE BLD-MCNC: 181 MG/DL (ref 75–99)
GLUCOSE BLD-MCNC: 240 MG/DL (ref 75–99)
GLUCOSE BLD-MCNC: 240 MG/DL (ref 75–99)
GLUCOSE BLD-MCNC: 261 MG/DL (ref 75–99)

## 2018-02-23 PROCEDURE — 0DB78ZX EXCISION OF STOMACH, PYLORUS, VIA NATURAL OR ARTIFICIAL OPENING ENDOSCOPIC, DIAGNOSTIC: ICD-10-PCS

## 2018-02-23 RX ADMIN — LEVOFLOXACIN SCH MLS/HR: 750 INJECTION, SOLUTION INTRAVENOUS at 22:02

## 2018-02-23 RX ADMIN — METHYLPREDNISOLONE SODIUM SUCCINATE SCH MG: 125 INJECTION, POWDER, FOR SOLUTION INTRAMUSCULAR; INTRAVENOUS at 12:18

## 2018-02-23 RX ADMIN — PANTOPRAZOLE SODIUM SCH MG: 40 INJECTION, POWDER, FOR SOLUTION INTRAVENOUS at 10:05

## 2018-02-23 RX ADMIN — DEXTROSE MONOHYDRATE SCH MLS/HR: 5 INJECTION, SOLUTION INTRAVENOUS at 09:04

## 2018-02-23 RX ADMIN — IPRATROPIUM BROMIDE AND ALBUTEROL SULFATE SCH: .5; 3 SOLUTION RESPIRATORY (INHALATION) at 21:54

## 2018-02-23 RX ADMIN — LATANOPROST SCH DROPS: 50 SOLUTION OPHTHALMIC at 22:00

## 2018-02-23 RX ADMIN — INSULIN ASPART SCH UNIT: 100 INJECTION, SOLUTION INTRAVENOUS; SUBCUTANEOUS at 22:01

## 2018-02-23 RX ADMIN — MUPIROCIN SCH APPLIC: 20 OINTMENT TOPICAL at 12:18

## 2018-02-23 RX ADMIN — IPRATROPIUM BROMIDE AND ALBUTEROL SULFATE SCH: .5; 3 SOLUTION RESPIRATORY (INHALATION) at 11:58

## 2018-02-23 RX ADMIN — INSULIN ASPART SCH: 100 INJECTION, SOLUTION INTRAVENOUS; SUBCUTANEOUS at 17:32

## 2018-02-23 RX ADMIN — METHYLPREDNISOLONE SODIUM SUCCINATE SCH MG: 125 INJECTION, POWDER, FOR SOLUTION INTRAMUSCULAR; INTRAVENOUS at 06:28

## 2018-02-23 RX ADMIN — INSULIN ASPART SCH UNIT: 100 INJECTION, SOLUTION INTRAVENOUS; SUBCUTANEOUS at 18:15

## 2018-02-23 RX ADMIN — INSULIN ASPART SCH UNIT: 100 INJECTION, SOLUTION INTRAVENOUS; SUBCUTANEOUS at 12:18

## 2018-02-23 RX ADMIN — IPRATROPIUM BROMIDE AND ALBUTEROL SULFATE SCH: .5; 3 SOLUTION RESPIRATORY (INHALATION) at 16:02

## 2018-02-23 RX ADMIN — METHYLPREDNISOLONE SODIUM SUCCINATE SCH MG: 125 INJECTION, POWDER, FOR SOLUTION INTRAMUSCULAR; INTRAVENOUS at 23:59

## 2018-02-23 RX ADMIN — CEFAZOLIN SCH MLS/HR: 330 INJECTION, POWDER, FOR SOLUTION INTRAMUSCULAR; INTRAVENOUS at 12:18

## 2018-02-23 RX ADMIN — INSULIN ASPART SCH: 100 INJECTION, SOLUTION INTRAVENOUS; SUBCUTANEOUS at 11:48

## 2018-02-23 RX ADMIN — CEFAZOLIN SCH: 330 INJECTION, POWDER, FOR SOLUTION INTRAMUSCULAR; INTRAVENOUS at 22:33

## 2018-02-23 RX ADMIN — MUPIROCIN SCH APPLIC: 20 OINTMENT TOPICAL at 09:38

## 2018-02-23 RX ADMIN — METHYLPREDNISOLONE SODIUM SUCCINATE SCH MG: 125 INJECTION, POWDER, FOR SOLUTION INTRAMUSCULAR; INTRAVENOUS at 18:14

## 2018-02-23 RX ADMIN — DEXTROSE MONOHYDRATE SCH MLS/HR: 5 INJECTION, SOLUTION INTRAVENOUS at 00:22

## 2018-02-23 RX ADMIN — INSULIN DETEMIR SCH UNIT: 100 INJECTION, SOLUTION SUBCUTANEOUS at 21:59

## 2018-02-23 RX ADMIN — BUDESONIDE SCH MG: 1 SUSPENSION RESPIRATORY (INHALATION) at 08:27

## 2018-02-23 RX ADMIN — IPRATROPIUM BROMIDE AND ALBUTEROL SULFATE SCH ML: .5; 3 SOLUTION RESPIRATORY (INHALATION) at 08:27

## 2018-02-23 RX ADMIN — MUPIROCIN SCH APPLIC: 20 OINTMENT TOPICAL at 22:02

## 2018-02-23 RX ADMIN — INSULIN ASPART SCH UNIT: 100 INJECTION, SOLUTION INTRAVENOUS; SUBCUTANEOUS at 10:03

## 2018-02-23 RX ADMIN — BUDESONIDE SCH: 1 SUSPENSION RESPIRATORY (INHALATION) at 21:54

## 2018-02-23 RX ADMIN — DEXTROSE MONOHYDRATE SCH MLS/HR: 5 INJECTION, SOLUTION INTRAVENOUS at 18:13

## 2018-02-23 RX ADMIN — METHYLPREDNISOLONE SODIUM SUCCINATE SCH MG: 125 INJECTION, POWDER, FOR SOLUTION INTRAMUSCULAR; INTRAVENOUS at 00:23

## 2018-02-23 RX ADMIN — INSULIN ASPART SCH: 100 INJECTION, SOLUTION INTRAVENOUS; SUBCUTANEOUS at 09:31

## 2018-02-23 RX ADMIN — PANTOPRAZOLE SODIUM SCH MG: 40 INJECTION, POWDER, FOR SOLUTION INTRAVENOUS at 21:59

## 2018-02-23 RX ADMIN — DEXTROSE MONOHYDRATE SCH MLS/HR: 5 INJECTION, SOLUTION INTRAVENOUS at 23:59

## 2018-02-23 NOTE — P.PCN
Date of Procedure: 02/23/18


Procedure(s) Performed: 


Procedure: Esophagogastroduodenoscopy and biopsy.





Preoperative diagnosis: Anemia and suspected GI bleeding.





Postoperative diagnosis: 1. Small sliding hiatal hernia with no obvious 

esophagitis or complicated reflux disease.  2. Mild antral gastritis.  3. 

Duodenitis and duodenal bulb ulcer covered with white exudate not bleeding at 

the time of the exam without any stigmata of recent bleeding or any evidence of 

gastric outlet obstruction.  4. Biopsies obtained from the antrum to rule out 

H. pylori.





Preparation sedation: Was provided by anesthesia.





Brief clinical history: The patient is a 72-year-old female with multiple 

medical comorbidities including severe COPD/BOOP O2 steroid dependent, morbid 

obesity BMI 50, hemorrhoids, uterine carcinoma with radiation, radiation 

colitis per biopsy colonoscopy March 2016, micro-circulatory pulmonary emboli 

maintained on Xarelto, migraines, chronic back pain, uterine carcinoma with 

radiation, restless leg syndrome. She was recently hospitalized a week ago with 

acute on chronic hypoxic respiratory failure mild CHF and bilateral pneumonia.  

Presently residing at rehab facility.  She presents with mental status changes, 

confusion, coughing wheezing, shortness of breath, and Hb of 6.4. Had low-grade 

fever T-max 100.6.  Influenza screen not detected. Was recently evaluated by 

our service December 2017 in regards to intermittent, painless bright red blood 

per rectum of 4-6 months duration.  She underwent colonoscopy evaluation on 12/ 08/2017 with Dr. Fields with findings of small internal hemorrhoids scattered 

sigmoid diverticulosis and mild sigmoid narrowing with mucosal erythema but no 

evidence of radiation colitis proctitis identified.  No telangiectasias. Denies 

epigastric pain.  Denies hematemesis or hematochezia.  She thinks she may have 

passed some black colored bowel movements a month ago her memory is poor and 

she struggles to provide details. Admission hemoglobin 6.4.  Platelets 447.  

Hemoglobin a week ago was 8.3. Home medications include but not limited to 

prednisone 30 mg daily, Xarelto 20 mg daily, ferrous sulfate 325 twice a day. 

No history of peptic ulcer disease or prior EGD.  The details are summarized in 

the history and physical and dictated consultations and progress notes.  This 

evaluation is to assess for possible upper GI source of bleeding.





Procedure: With the patient on her left lateral decubitus position and after 

informed consent and adequate sedation, I passed the Olympus- video 

upper endoscope through the cricopharyngeus down the esophagus.  There was a 

small sliding hiatal hernia but no obvious esophagitis or complicated reflux 

disease.  The endoscope was then passed into the stomach which was insufflated 

with air and inspected in detail including the retroflex view in the cardia.  

There was some mottling and erythema in the antrum but no ulcers or erosions.  

Pyloric channel did not show any ulcers.  Duodenal bulb showed erythema and 

edema and there was a 1.5 cm deep duodenal bulb ulcer covered with white 

exudate but not showing any stigmata of bleeding or manifesting any evidence of 

bleeding.  There was no obstruction to the gastric outlet.  Post bulbar area 

and descending duodenum appeared normal.  I obtained biopsies from the antrum 

then the endoscope was withdrawn.





The patient tolerated the procedure well.





Plan: The patient was reassured.  She is already on Protonix which would be 

continued.  Will allow regular diet as tolerated and make further plans based 

on her course and biopsy results.  Will continue to follow with interest.

## 2018-02-23 NOTE — P.PN
Subjective


Patient was admitted secondary to respiratory failure which is believed 

secondary to BOOP.  Patient has significant improvement patient is presently on 

4 L of onset and saturating well no GI bleed at this time hemoglobin remained 

stable patient will undergo EGD anti-correlation is on hold because of drop in 

hemoglobin which is probably subacute loss of blood 0r a slow GI bleed.  

Patient is much less confused today.





02/23/2018


Patient's is pretty status is fairly stable patient will undergo EGD today 

possibly of discharge tomorrow.





Constitutional: Denied any fatigue denied any fever.


Cardio vascular: denied any chest pain, palpitations


Gastrointestinal denied any nausea vomiting


Pulmonary: Denied any shortness of breath cough


Neurologic denied any new focal deficits





Objective





- Vital Signs


Vital signs: 


 Vital Signs











Temp  97.6 F   02/23/18 11:23


 


Pulse  93   02/23/18 11:23


 


Resp  17   02/23/18 11:23


 


BP  137/65   02/23/18 11:23


 


Pulse Ox  95   02/23/18 11:23








 Intake & Output











 02/22/18 02/23/18 02/23/18





 18:59 06:59 18:59


 


Intake Total 60  


 


Output Total 200  


 


Balance -140  


 


Weight  133 kg 


 


Intake:   


 


  Oral 60  


 


Output:   


 


  Urine 200  


 


Other:   


 


  Voiding Method Diaper Bedpan Bedpan





 Incontinent Diaper 





  Incontinent 


 


  # Voids 1  














- Exam


PHYSICAL EXAMINATION: 





GENERAL: The patient is alert and oriented 2-3, he is in mild respiratory 

distress using accessory muscles of breathing, moderately obese


HEENT: Pupils are round and equally reacting to light. EOMI. No scleral 

icterus. No conjunctival pallor. Normocephalic, atraumatic. No pharyngeal 

erythema. No thyromegaly. 


CARDIOVASCULAR: S1 and S2 present. No murmurs, rubs, or gallops. 


PULMONARY: Fairly good air entry bilateral lung fields mild expiratory wheezing 

was appreciated


ABDOMEN: Soft, nontender, nondistended, normoactive bowel sounds. No palpable 

organomegaly. 


MUSCULOSKELETAL: No joint swelling or deformity.


EXTREMITIES: No cyanosis, clubbing, or pedal edema. 


NEUROLOGICAL: Gross neurological examination did not reveal any focal deficits. 


SKIN: No rashes. 








- Labs


CBC & Chem 7: 


 02/22/18 08:23





 02/22/18 08:23


Labs: 


 Abnormal Lab Results - Last 24 Hours (Table)











  02/22/18 02/22/18 02/22/18 Range/Units





  12:27 12:29 16:51 


 


POC Glucose (mg/dL)  590 H  351 H  254 H  (75-99)  mg/dL














  02/22/18 02/23/18 02/23/18 Range/Units





  20:58 05:45 11:44 


 


POC Glucose (mg/dL)  261 H  240 H  240 H  (75-99)  mg/dL








 Microbiology - Last 24 Hours (Table)











 02/20/18 21:30 Blood Culture - Preliminary





 Blood    No Growth after 48 hours


 


 02/20/18 22:51 Urine Culture - Final





 Urine,Catheterized 














Assessment and Plan


Plan: 


-Sepsis: Possible source being bilateral bronchopneumonia patient is on broad 

symptom antibiotics Zosyn and vancomycin.


-Altered mental status: Secondary to toxic and metabolic encephalopathy from 

hypercapnia and sepsis.  Improved now


-Acute on chronic hypoxic and hypercapnic respiratory failure: Secondary to 

bronchogenic organizing  pneumonia and COPD with acute exacerbation.


-History of recent pulmonary embolism with microemboli for which patient is on 

anticoagulation which is being held because of drop in hemoglobin and 

possibility of GI bleed.


-Acute subacute blood loss anemia: Rule out GI bleed gastric body was consulted 

monitor closely patient is status post 1 unit of blood transfusion.


 patient undergo upper GI endoscopy today


Obesity 


-Type II Diabetes mellitus


-Gastroesophageal reflux disease


-Hypertension


- Hyperlipidemia


- Restless leg syndrome


- Migraine cephalgia


-Status post VATS lung biopsy


-possible underlying healthcare acquired pneumonia.

## 2018-02-23 NOTE — P.PN
Subjective


Progress Note Date: 02/23/18


Principal diagnosis: 





Acute altered mental status secondary to metabolic encephalopathy.





This is a 72-year-old female with history of advanced COPD, O2 dependent, 

history of biopsy-proven bronchiolitis obliterans with organizing pneumonia/

cryptogenic organizing pneumonia, history of morbid obesity, patient is 

normally on prednisone for her bronchiolitis obliterans with organizing 

pneumonia.  Patient was recently in the hospital for multiple pulmonary 

symptoms including shortness of breath, cough wheezing, and altered mental 

status secondary to metabolic encephalopathy.  During her last admission which 

was only a week ago, patient had acute on chronic hypoxic respiratory failure, 

mild congestive heart failure, and bilateral pneumonia.  She also had acute on 

chronic renal failure, hyperchloremic metabolic alkalosis, and underlying COPD.

  The patient was discharged to rehab facility on 2/16/2018, and she is back 

here today complaining of similar presentation mostly fever, mental status 

change, and confusion, and intermittent cough wheezing shortness of breath.  

Chest x-ray actually is improved compared to the chest x-ray on the last 

admission, it did show multifocal pneumonia mostly in the upper lobes, and mild 

interstitial changes bilaterally suspicious for atypical pulmonary edema.  

Patchy airspace disease was noted in the right upper lobe and in the left 

midlung.  Considering the findings on the chest x-ray, I was asked to see the 

patient on consultation.  Patient is a poor historian, she has no clue why she 

was admitted to the hospital, seems to be a bit confused.  CBC showed a bit of 

leukocytosis with WBC count of 15.4, hemoglobin is 8.6, however her admission 

hemoglobin was 6.4, the patient received already 1 unit of packed RBCs.  Final 

screen was negative.





The patient is seen again today 02/22/2018 in follow-up on the [facility] 

[facility].  She is awake and alert in no acute distress.  She is oriented 3.  She 

is currently sitting up in a chair at the bedside.  She is maintaining good O2 

saturations in the 90s on 4 L/m per nasal cannula.  She's been afebrile.  

Hemodynamically stable.  Blood culture reveals no growth.  Urine culture 

reveals no growth.  White count 11.2.  Hemoglobin 8.4.  Creatinine 1.36.  GI 

services on the case regarding her anemia and possible EGD planned to rule out 

peptic ulcer disease.  Anticoagulations is on hold.





The patient is seen again today 02/23/2018 in follow-up on the [facility] 

[facility].  She is awake and alert in no acute distress.  She denies any worsening 

shortness of breath, cough or congestion.  His maintaining good O2 saturations 

in the 90s on 4 L/m per nasal cannula.  She's afebrile.  Hemodynamically 

stable.  Blood and urine cultures reveal no growth to date.  Plan is for EGD 

today.





Objective





- Vital Signs


Vital signs: 


 Vital Signs











Temp  97.6 F   02/23/18 11:23


 


Pulse  93   02/23/18 11:23


 


Resp  17   02/23/18 11:23


 


BP  137/65   02/23/18 11:23


 


Pulse Ox  95   02/23/18 11:23








 Intake & Output











 02/22/18 02/23/18 02/23/18





 18:59 06:59 18:59


 


Intake Total 60  


 


Output Total 200  


 


Balance -140  


 


Weight  133 kg 


 


Intake:   


 


  Oral 60  


 


Output:   


 


  Urine 200  


 


Other:   


 


  Voiding Method Diaper Bedpan 





 Incontinent Diaper 





  Incontinent 


 


  # Voids 1  














- Exam








Patient is awake, in no distress.  On nasal cannula 4 L nasal cannula 





HEENT examination is grossly unremarkable.  Mucous membranes are moist.  No 

oral lesions.





Neck supple.  Full range of motion.  No adenopathy thyromegaly or neck vein 

distention.





Cardiovascular examination reveals regular rhythm rate.  S1-S2 normal.  No S3 

or S4.  No discernible murmur noted.





Lungs reveal a scattered wheezes.  Breath sounds are equal bilaterally.  





Abdomen soft bowel sounds are heard.  No masses or tenderness.  Abdomen is 

obese.





Extremities are intact.  There is chronic venous stasis changes and some mild 

lower summary edema. 





Skin is without rash or lesion.





Neurologic examination revealed 72-year-old slightly confused, unaware why she 

is in the hospital, otherwise no gross focal neurologic deficit





Lymphatics: No lymphadenopathy





Psychiatric: Blunted mood and affect, poor judgment and insight at this point.





- Labs


CBC & Chem 7: 


 02/22/18 08:23





 02/22/18 08:23


Labs: 


 Abnormal Lab Results - Last 24 Hours (Table)











  02/22/18 02/22/18 02/22/18 Range/Units





  12:27 12:29 16:51 


 


POC Glucose (mg/dL)  590 H  351 H  254 H  (75-99)  mg/dL














  02/22/18 02/23/18 Range/Units





  20:58 05:45 


 


POC Glucose (mg/dL)  261 H  240 H  (75-99)  mg/dL








 Microbiology - Last 24 Hours (Table)











 02/20/18 21:30 Blood Culture - Preliminary





 Blood    No Growth after 48 hours


 


 02/20/18 22:51 Urine Culture - Final





 Urine,Catheterized 














Assessment and Plan


Assessment: 





Impression:





1.  Acute altered mental status, secondary to metabolic encephalopathy.  

Recovered.





2. Acute on chronic hypoxic respiratory failure , multifactorial secondary to 

mild congestive heart failure, COPD, and bronchiolitis obliterans with 

organizing pneumonia/boop/cryptogenic organizing pneumonia.





3. Acute on chronic anemia most likely secondary to GI blood losses secondary 

to Xarelto.  Xarelto currently on hold.  Plans for EGD.





4 Recent admission for COPD exacerbation complicated by purulent 

tracheobronchitis/bronchopneumonia involving the upper lobes





5 Biopsy-proven bronchiolitis obliterans organizing pneumonia





7. Microcirculatory pulmonary arteriopathy/thrombi, documented on previous 

scanning of the chest, clinical significance of which is not clear at this point

, but Xarelto will be placed on hold.





8. Obesity





9. Uterine cancer





10. Diabetes mellitus





11. Gastroesophageal reflux disease





12. Hypertension





13. Hyperlipidemia





14. Restless leg syndrome





15. Migraine cephalgia





16. Status post VATS lung biopsy





17 possible underlying healthcare acquired pneumonia.  However the findings on 

the chest are mostly related to bronchiolitis obliterans with organizing 

pneumonia.





Plan:





The patient was seen and evaluated by Dr. Aragon.  She is currently stable from 

the pulmonary standpoint.  We'll continue with bronchodilators, Solu-Medrol, 

antibiotics, and antibiotics.  GI has been consulted and the plan is for EGD 

today.  We'll continue to follow.





I, the cosigning physician, performed a history & physical examination of the 

patient. Lungs sounds coarse crackles in the bilateral posterior bases.  

Maintaining good O2 saturations in the 90s on 4 L/m per nasal cannula.  I 

discussed the assessment and plan of care with my nurse practitioner, Veronica Gao. I attest to the above note as dictated by her.

## 2018-02-24 LAB
ANION GAP SERPL CALC-SCNC: 8 MMOL/L
BUN SERPL-SCNC: 41 MG/DL (ref 7–17)
CALCIUM SPEC-MCNC: 8.7 MG/DL (ref 8.4–10.2)
CHLORIDE SERPL-SCNC: 109 MMOL/L (ref 98–107)
CO2 SERPL-SCNC: 27 MMOL/L (ref 22–30)
ERYTHROCYTE [DISTWIDTH] IN BLOOD BY AUTOMATED COUNT: 3.03 M/UL (ref 3.8–5.4)
ERYTHROCYTE [DISTWIDTH] IN BLOOD: 18.9 % (ref 11.5–15.5)
GLUCOSE BLD-MCNC: 237 MG/DL (ref 75–99)
GLUCOSE BLD-MCNC: 268 MG/DL (ref 75–99)
GLUCOSE BLD-MCNC: 296 MG/DL (ref 75–99)
GLUCOSE BLD-MCNC: 299 MG/DL (ref 75–99)
GLUCOSE SERPL-MCNC: 190 MG/DL (ref 74–99)
HCT VFR BLD AUTO: 30 % (ref 34–46)
HGB BLD-MCNC: 8.4 GM/DL (ref 11.4–16)
MCH RBC QN AUTO: 27.9 PG (ref 25–35)
MCHC RBC AUTO-ENTMCNC: 28.1 G/DL (ref 31–37)
MCV RBC AUTO: 99.1 FL (ref 80–100)
PLATELET # BLD AUTO: 392 K/UL (ref 150–450)
POTASSIUM SERPL-SCNC: 3.4 MMOL/L (ref 3.5–5.1)
SODIUM SERPL-SCNC: 144 MMOL/L (ref 137–145)
WBC # BLD AUTO: 13.3 K/UL (ref 3.8–10.6)

## 2018-02-24 RX ADMIN — INSULIN ASPART SCH UNIT: 100 INJECTION, SOLUTION INTRAVENOUS; SUBCUTANEOUS at 06:23

## 2018-02-24 RX ADMIN — INSULIN ASPART SCH: 100 INJECTION, SOLUTION INTRAVENOUS; SUBCUTANEOUS at 20:54

## 2018-02-24 RX ADMIN — MUPIROCIN SCH APPLIC: 20 OINTMENT TOPICAL at 21:19

## 2018-02-24 RX ADMIN — INSULIN ASPART SCH UNIT: 100 INJECTION, SOLUTION INTRAVENOUS; SUBCUTANEOUS at 12:37

## 2018-02-24 RX ADMIN — INSULIN ASPART SCH: 100 INJECTION, SOLUTION INTRAVENOUS; SUBCUTANEOUS at 21:40

## 2018-02-24 RX ADMIN — BUDESONIDE SCH MG: 1 SUSPENSION RESPIRATORY (INHALATION) at 08:00

## 2018-02-24 RX ADMIN — PANTOPRAZOLE SODIUM SCH MG: 40 INJECTION, POWDER, FOR SOLUTION INTRAVENOUS at 08:47

## 2018-02-24 RX ADMIN — METHYLPREDNISOLONE SODIUM SUCCINATE SCH MG: 125 INJECTION, POWDER, FOR SOLUTION INTRAMUSCULAR; INTRAVENOUS at 17:48

## 2018-02-24 RX ADMIN — DEXTROSE MONOHYDRATE SCH MLS/HR: 5 INJECTION, SOLUTION INTRAVENOUS at 08:47

## 2018-02-24 RX ADMIN — METHYLPREDNISOLONE SODIUM SUCCINATE SCH MG: 125 INJECTION, POWDER, FOR SOLUTION INTRAMUSCULAR; INTRAVENOUS at 12:36

## 2018-02-24 RX ADMIN — INSULIN ASPART SCH: 100 INJECTION, SOLUTION INTRAVENOUS; SUBCUTANEOUS at 16:12

## 2018-02-24 RX ADMIN — IPRATROPIUM BROMIDE AND ALBUTEROL SULFATE SCH ML: .5; 3 SOLUTION RESPIRATORY (INHALATION) at 08:00

## 2018-02-24 RX ADMIN — CEFAZOLIN SCH: 330 INJECTION, POWDER, FOR SOLUTION INTRAMUSCULAR; INTRAVENOUS at 17:51

## 2018-02-24 RX ADMIN — INSULIN ASPART SCH UNIT: 100 INJECTION, SOLUTION INTRAVENOUS; SUBCUTANEOUS at 21:21

## 2018-02-24 RX ADMIN — IPRATROPIUM BROMIDE AND ALBUTEROL SULFATE SCH ML: .5; 3 SOLUTION RESPIRATORY (INHALATION) at 20:11

## 2018-02-24 RX ADMIN — CEFAZOLIN SCH: 330 INJECTION, POWDER, FOR SOLUTION INTRAMUSCULAR; INTRAVENOUS at 06:24

## 2018-02-24 RX ADMIN — MUPIROCIN SCH APPLIC: 20 OINTMENT TOPICAL at 08:47

## 2018-02-24 RX ADMIN — METHYLPREDNISOLONE SODIUM SUCCINATE SCH MG: 125 INJECTION, POWDER, FOR SOLUTION INTRAMUSCULAR; INTRAVENOUS at 06:23

## 2018-02-24 RX ADMIN — MUPIROCIN SCH APPLIC: 20 OINTMENT TOPICAL at 12:37

## 2018-02-24 RX ADMIN — LATANOPROST SCH DROPS: 50 SOLUTION OPHTHALMIC at 21:19

## 2018-02-24 RX ADMIN — DEXTROSE MONOHYDRATE SCH MLS/HR: 5 INJECTION, SOLUTION INTRAVENOUS at 16:16

## 2018-02-24 RX ADMIN — BUDESONIDE SCH MG: 1 SUSPENSION RESPIRATORY (INHALATION) at 20:11

## 2018-02-24 RX ADMIN — PANTOPRAZOLE SODIUM SCH MG: 40 INJECTION, POWDER, FOR SOLUTION INTRAVENOUS at 21:18

## 2018-02-24 RX ADMIN — IPRATROPIUM BROMIDE AND ALBUTEROL SULFATE SCH ML: .5; 3 SOLUTION RESPIRATORY (INHALATION) at 11:50

## 2018-02-24 RX ADMIN — INSULIN ASPART SCH UNIT: 100 INJECTION, SOLUTION INTRAVENOUS; SUBCUTANEOUS at 17:49

## 2018-02-24 RX ADMIN — INSULIN DETEMIR SCH UNIT: 100 INJECTION, SOLUTION SUBCUTANEOUS at 21:21

## 2018-02-24 RX ADMIN — IPRATROPIUM BROMIDE AND ALBUTEROL SULFATE SCH ML: .5; 3 SOLUTION RESPIRATORY (INHALATION) at 16:04

## 2018-02-24 NOTE — XR
EXAMINATION TYPE: XR chest 1V portable

 

DATE OF EXAM: 2/24/2018

 

HISTORY: boop.

 

REFERENCE: Previous study dated 2/21/2018.

 

FINDINGS: Patchy, bilateral infiltrates remain and have worsened slightly from the previous exam. The
 heart is mildly enlarged. Pleural spaces are clear.

 

IMPRESSION: 

 

SLIGHTLY WORSENED BILATERAL AIRSPACE DISEASE.

## 2018-02-24 NOTE — P.PN
Subjective


Progress Note Date: 02/24/18


Principal diagnosis: 


Acute metabolic encephalopathy and altered mental status with acute on chronic 

hypoxic respiratory failure, multifactorial








This is a 72-year-old female with history of advanced COPD, O2 dependent, 

history of biopsy-proven bronchiolitis obliterans with organizing pneumonia/

cryptogenic organizing pneumonia, history of morbid obesity, patient is 

normally on prednisone for her bronchiolitis obliterans with organizing 

pneumonia.  Patient was recently in the hospital for multiple pulmonary 

symptoms including shortness of breath, cough wheezing, and altered mental 

status secondary to metabolic encephalopathy.  During her last admission which 

was only a week ago, patient had acute on chronic hypoxic respiratory failure, 

mild congestive heart failure, and bilateral pneumonia.  She also had acute on 

chronic renal failure, hyperchloremic metabolic alkalosis, and underlying COPD.

  The patient was discharged to rehab facility on 2/16/2018, and she is back 

here today complaining of similar presentation mostly fever, mental status 

change, and confusion, and intermittent cough wheezing shortness of breath.  

Chest x-ray actually is improved compared to the chest x-ray on the last 

admission, it did show multifocal pneumonia mostly in the upper lobes, and mild 

interstitial changes bilaterally suspicious for atypical pulmonary edema.  

Patchy airspace disease was noted in the right upper lobe and in the left 

midlung.  Considering the findings on the chest x-ray, I was asked to see the 

patient on consultation.  Patient is a poor historian, she has no clue why she 

was admitted to the hospital, seems to be a bit confused.  CBC showed a bit of 

leukocytosis with WBC count of 15.4, hemoglobin is 8.6, however her admission 

hemoglobin was 6.4, the patient received already 1 unit of packed RBCs.  Final 

screen was negative.





The patient is seen again today 02/22/2018 in follow-up on the [facility] 

[facility].  She is awake and alert in no acute distress.  She is oriented 3.  She 

is currently sitting up in a chair at the bedside.  She is maintaining good O2 

saturations in the 90s on 4 L/m per nasal cannula.  She's been afebrile.  

Hemodynamically stable.  Blood culture reveals no growth.  Urine culture 

reveals no growth.  White count 11.2.  Hemoglobin 8.4.  Creatinine 1.36.  GI 

services on the case regarding her anemia and possible EGD planned to rule out 

peptic ulcer disease.  Anticoagulations is on hold.





The patient is seen again today 02/23/2018 in follow-up on the [facility] 

[facility].  She is awake and alert in no acute distress.  She denies any worsening 

shortness of breath, cough or congestion.  His maintaining good O2 saturations 

in the 90s on 4 L/m per nasal cannula.  She's afebrile.  Hemodynamically 

stable.  Blood and urine cultures reveal no growth to date.  Plan is for EGD 

today.





Patient was reevaluated today on 2/24/2018, patient is basically about the same

, continues to have intermittent episodes of cough wheezing shortness of breath

, and intermittent episodes of confusion.  EGD yesterday was noted, CBC is 

relatively normal except for hemoglobin of 8.4, BUN is 41 and creatinine is 

1.40.  Chest x-ray continues to show bilateral airspace disease, consistent 

with previously diagnosed bronchiolitis obliterans with organizing pneumonia.





Objective





- Vital Signs


Vital signs: 


 Vital Signs











Temp  98.0 F   02/24/18 09:01


 


Pulse  85   02/24/18 12:01


 


Resp  16   02/24/18 11:51


 


BP  124/60   02/24/18 09:01


 


Pulse Ox  97   02/24/18 09:01








 Intake & Output











 02/23/18 02/24/18 02/24/18





 18:59 06:59 18:59


 


Intake Total 50  


 


Balance 50  


 


Weight  117 kg 


 


Intake:   


 


  IV 50  


 


Other:   


 


  Voiding Method Bedpan Bedpan 


 


  # Voids 1  


 


  # Bowel Movements 1  














- Exam








Patient is awake, in no distress.  On nasal cannula 4 L nasal cannula 





HEENT examination is grossly unremarkable.  Mucous membranes are moist.  No 

oral lesions.





Neck supple.  Full range of motion.  No adenopathy thyromegaly or neck vein 

distention.





Cardiovascular examination reveals regular rhythm rate.  S1-S2 normal.  No S3 

or S4.  No discernible murmur noted.





Lungs diffuse rhonchi and wheezing noted bilaterally.





Abdomen soft bowel sounds are heard.  No masses or tenderness.  Abdomen is 

obese.





Extremities are intact.  There is chronic venous stasis changes and some mild 

lower extremities edema 





Skin is without rash or lesion.





Neurologic examination revealed 72-year-old slightly confused, unaware why she 

is in the hospital, otherwise no gross focal neurologic deficit





Lymphatics: No lymphadenopathy





Psychiatric: Blunted mood and affect, poor judgment and insight at this point.








- Labs


CBC & Chem 7: 


 02/24/18 06:07





 02/24/18 06:07


Labs: 


 Abnormal Lab Results - Last 24 Hours (Table)











  02/23/18 02/23/18 02/24/18 Range/Units





  16:58 21:02 05:55 


 


WBC     (3.8-10.6)  k/uL


 


RBC     (3.80-5.40)  m/uL


 


Hgb     (11.4-16.0)  gm/dL


 


Hct     (34.0-46.0)  %


 


MCHC     (31.0-37.0)  g/dL


 


RDW     (11.5-15.5)  %


 


Potassium     (3.5-5.1)  mmol/L


 


Chloride     ()  mmol/L


 


BUN     (7-17)  mg/dL


 


Creatinine     (0.52-1.04)  mg/dL


 


Glucose     (74-99)  mg/dL


 


POC Glucose (mg/dL)  181 H  261 H  237 H  (75-99)  mg/dL














  02/24/18 02/24/18 Range/Units





  06:07 06:07 


 


WBC  13.3 H   (3.8-10.6)  k/uL


 


RBC  3.03 L   (3.80-5.40)  m/uL


 


Hgb  8.4 L   (11.4-16.0)  gm/dL


 


Hct  30.0 L   (34.0-46.0)  %


 


MCHC  28.1 L   (31.0-37.0)  g/dL


 


RDW  18.9 H   (11.5-15.5)  %


 


Potassium   3.4 L  (3.5-5.1)  mmol/L


 


Chloride   109 H  ()  mmol/L


 


BUN   41 H  (7-17)  mg/dL


 


Creatinine   1.40 H  (0.52-1.04)  mg/dL


 


Glucose   190 H  (74-99)  mg/dL


 


POC Glucose (mg/dL)    (75-99)  mg/dL








 Microbiology - Last 24 Hours (Table)











 02/20/18 21:30 Blood Culture - Preliminary





 Blood    No Growth after 72 hours














Assessment and Plan


Assessment: 





1.  Acute altered mental status, secondary to metabolic encephalopathy





2. Acute on chronic hypoxic respiratory failure , multifactorial secondary to 

mild congestive heart failure, COPD, and bronchiolitis obliterans with 

organizing pneumonia/boop/cryptogenic organizing pneumonia.





3.  Acute on chronic anemia most likely secondary to GI blood losses secondary 

to Xarelto.





4 Recent admission for COPD exacerbation complicated by purulent 

tracheobronchitis/bronchopneumonia involving the upper lobes





5 Biopsy-proven bronchiolitis obliterans organizing pneumonia





7. Microcirculatory pulmonary arteriopathy/thrombi, documented on previous 

scanning of the chest, clinical significance of which is not clear at this point

, but Xarelto will be placed on hold.





8. Obesity





9. Uterine cancer





10. Diabetes mellitus





11. Gastroesophageal reflux disease





12. Hypertension





13. Hyperlipidemia





14. Restless leg syndrome





15. Migraine cephalgia





16. Status post VATS lung biopsy





17 possible underlying healthcare acquired pneumonia.  However I believe the 

findings on the chest are mostly related to bronchiolitis obliterans with 

organizing pneumonia.





Recommendation: Continue present treatment plan including broad-spectrum 

antibiotics, high doses of Solu-Medrol, diuretics, bronchodilators, and we will 

continue to follow. 





Time with Patient: Less than 30

## 2018-02-25 LAB
GLUCOSE BLD-MCNC: 141 MG/DL (ref 75–99)
GLUCOSE BLD-MCNC: 227 MG/DL (ref 75–99)
GLUCOSE BLD-MCNC: 258 MG/DL (ref 75–99)
GLUCOSE BLD-MCNC: 322 MG/DL (ref 75–99)

## 2018-02-25 RX ADMIN — IPRATROPIUM BROMIDE AND ALBUTEROL SULFATE SCH ML: .5; 3 SOLUTION RESPIRATORY (INHALATION) at 16:17

## 2018-02-25 RX ADMIN — METHYLPREDNISOLONE SODIUM SUCCINATE SCH MG: 125 INJECTION, POWDER, FOR SOLUTION INTRAMUSCULAR; INTRAVENOUS at 06:29

## 2018-02-25 RX ADMIN — METHYLPREDNISOLONE SODIUM SUCCINATE SCH: 125 INJECTION, POWDER, FOR SOLUTION INTRAMUSCULAR; INTRAVENOUS at 17:35

## 2018-02-25 RX ADMIN — PANTOPRAZOLE SODIUM SCH MG: 40 TABLET, DELAYED RELEASE ORAL at 08:44

## 2018-02-25 RX ADMIN — IPRATROPIUM BROMIDE AND ALBUTEROL SULFATE SCH: .5; 3 SOLUTION RESPIRATORY (INHALATION) at 20:03

## 2018-02-25 RX ADMIN — METHYLPREDNISOLONE SODIUM SUCCINATE SCH MG: 125 INJECTION, POWDER, FOR SOLUTION INTRAMUSCULAR; INTRAVENOUS at 00:09

## 2018-02-25 RX ADMIN — BUDESONIDE SCH MG: 1 SUSPENSION RESPIRATORY (INHALATION) at 09:00

## 2018-02-25 RX ADMIN — INSULIN ASPART SCH UNIT: 100 INJECTION, SOLUTION INTRAVENOUS; SUBCUTANEOUS at 08:45

## 2018-02-25 RX ADMIN — DEXTROSE MONOHYDRATE SCH MLS/HR: 5 INJECTION, SOLUTION INTRAVENOUS at 08:43

## 2018-02-25 RX ADMIN — INSULIN ASPART SCH UNIT: 100 INJECTION, SOLUTION INTRAVENOUS; SUBCUTANEOUS at 17:36

## 2018-02-25 RX ADMIN — CEFAZOLIN SCH: 330 INJECTION, POWDER, FOR SOLUTION INTRAMUSCULAR; INTRAVENOUS at 14:58

## 2018-02-25 RX ADMIN — PANTOPRAZOLE SODIUM SCH MG: 40 TABLET, DELAYED RELEASE ORAL at 20:59

## 2018-02-25 RX ADMIN — IPRATROPIUM BROMIDE AND ALBUTEROL SULFATE SCH ML: .5; 3 SOLUTION RESPIRATORY (INHALATION) at 09:00

## 2018-02-25 RX ADMIN — INSULIN ASPART SCH UNIT: 100 INJECTION, SOLUTION INTRAVENOUS; SUBCUTANEOUS at 06:33

## 2018-02-25 RX ADMIN — LATANOPROST SCH DROPS: 50 SOLUTION OPHTHALMIC at 20:57

## 2018-02-25 RX ADMIN — CEFAZOLIN SCH MLS/HR: 330 INJECTION, POWDER, FOR SOLUTION INTRAMUSCULAR; INTRAVENOUS at 04:05

## 2018-02-25 RX ADMIN — INSULIN DETEMIR SCH UNIT: 100 INJECTION, SOLUTION SUBCUTANEOUS at 21:01

## 2018-02-25 RX ADMIN — INSULIN ASPART SCH UNIT: 100 INJECTION, SOLUTION INTRAVENOUS; SUBCUTANEOUS at 14:52

## 2018-02-25 RX ADMIN — DEXTROSE MONOHYDRATE SCH MLS/HR: 5 INJECTION, SOLUTION INTRAVENOUS at 00:08

## 2018-02-25 RX ADMIN — IPRATROPIUM BROMIDE AND ALBUTEROL SULFATE SCH ML: .5; 3 SOLUTION RESPIRATORY (INHALATION) at 13:36

## 2018-02-25 RX ADMIN — BUDESONIDE SCH: 1 SUSPENSION RESPIRATORY (INHALATION) at 20:03

## 2018-02-25 RX ADMIN — AMOXICILLIN AND CLAVULANATE POTASSIUM SCH EACH: 500; 125 TABLET, FILM COATED ORAL at 20:57

## 2018-02-25 RX ADMIN — MUPIROCIN SCH APPLIC: 20 OINTMENT TOPICAL at 08:44

## 2018-02-25 RX ADMIN — INSULIN ASPART SCH UNIT: 100 INJECTION, SOLUTION INTRAVENOUS; SUBCUTANEOUS at 14:51

## 2018-02-25 RX ADMIN — MUPIROCIN SCH APPLIC: 20 OINTMENT TOPICAL at 17:35

## 2018-02-25 RX ADMIN — MUPIROCIN SCH APPLIC: 20 OINTMENT TOPICAL at 20:58

## 2018-02-25 NOTE — PN
PROGRESS NOTE



DATE OF SERVICE:

February 24, 2018.



PRESENTING COMPLAINT:

Short of breath.



INTERVAL HISTORY:

This patient is seen by me yesterday on February 24, 2018.  The patient admitted with

acute metabolic encephalopathy, felt to be exacerbation of BOOP.  The patient is

feeling better.  Sitting up on a chair with oxygen nasal cannula.  Did tolerate some

diet.  The patient also was found to have an ulcer on the EGD and Xarelto was

discontinued.



REVIEW OF SYSTEMS:

Done for constitutional, cardiovascular, GI, pulmonary; relevant findings as above.



CURRENT MEDICATIONS:

Reviewed that include IV Solu-Medrol, IV Zosyn.



PHYSICAL EXAMINATION:

Temperature 97.8, pulse 94, respiration 20, blood pressure 130/72, pulse ox 94% on 4 L.

GENERAL APPEARANCE:  Well-built, sitting up in a chair, awake.  Eyes pupils equal.

Conjunctivae normal.  HEENT:  External appearance of nose and ears normal.  Oral cavity

dry.  Nasal cannula in place.  Neck JVD not raised.  Mass not palpable.  Respiratory

effort increased.

LUNGS:  Diminished breath sounds some wheezing.  Cardiovascular 1st and 2nd sounds

normal.  Mild edema.

ABDOMEN:  Soft, nontender.  Liver and spleen not palpable.  Psychiatry: Alert and

oriented x3.  Mood and affect normal.



INVESTIGATIONS:

White count 13.3, hemoglobin 8.4, potassium 3.4, BUN 41, creatinine 1.40.



ASSESSMENT:

1. Acute exacerbation again of biopsy-proven bronchiolitis obliterans organizing

    pneumonia.

2. Possible bacterial pneumonia suspect gram-negative for which patient is also on

    antibiotics.

3. Acute on chronic metabolic encephalopathy.

4. Acute on chronic hypoxic and hypercapnic respiratory failure.

5. Chronic sigmoid diverticulosis.

6. Chronic obstructive pulmonary disease in an ex-smoker.

7. Obesity hypoventilation syndrome.

8. Chronic cor pulmonale.

9. Diabetes mellitus type 2, chronically on insulin.

10.Chronic low back pain.  Spinal stenosis.

11.Chronic bilateral lower extremity lymphedema.

12.Chronic urinary stress incontinence.

13.Gait dysfunction uses a walker.

14.Chronic lower extremity cellulitis.

15.Chronic restless leg syndrome.

16.History of splenectomy.

17.Thromboembolic lung disease felt to be less likely by Dr. Aragon.

18.Duodenal ulcer causing acute gastrointestinal bleed and acute blood loss anemia.

    The patient has not been taken off Xarelto.



PLAN:

Continue current medication and treatment plan.  Antibiotics per Dr. Aragon.

Antibiotics per Dr. Aragon.  Get scaled back on the dose of steroids.  Overall

prognosis is guarded.  Care was discussed with the patient.





MMODL / IJN: [id number] / Job#: [id number]

## 2018-02-25 NOTE — PN
PROGRESS NOTE



DATE OF SERVICE:

February 25, 2018.



PRESENTING COMPLAINT:

Tired.



INTERVAL HISTORY:

Patient admitted with acute metabolic encephalopathy and exacerbation of BOOP.

Empirically on antibiotics.  Felt a bit tired today.  Really did not get out of bed.

Did tolerate her meals.  The patient's Xarelto has been discontinued because of the

duodenal ulcer.



REVIEW OF SYSTEMS:

Done for constitutional, cardiovascular, GI, pulmonary and relevant findings as above.



CURRENT MEDICATIONS:

Reviewed that include Diamox, DuoNeb, Augmentin, IV Solu-Medrol 40 q.8.



EXAMINATION:

Temperature 98.2, pulse 76, respiratory 18, blood pressure 132/62, pulse ox 96% on 4 L.

General appearance: Lying in bed, tired appearing.  Eyes pupils are equal. Conjunctivae

normal.  HEENT:  External appearance of nose and ears normal.  Oral cavity normal.

Neck JVD unable to assess.  Mass not palpable.  Respiratory effort increased.

LUNGS:  Diminished breath sounds.  Minimal wheezing.  Cardiovascular 1st and 2nd sounds

normal.  Chronic lymphedema and chronic cellulitis.

ABDOMEN:  Soft, nontender.  Liver and spleen not palpable.  Psychiatry:  Alert and

oriented x3.  Answering questions appropriately.



INVESTIGATIONS:

Accu-Cheks noted.



ASSESSMENT:

1. Acute exacerbation of biopsy-proven bronchiolitis obliterans organizing pneumonia.

2. Possible bacterial pneumonia suspect gram-negative.

3. Acute on chronic metabolic encephalopathy now improved.

4. Acute on chronic hypoxic and hypercapnic respiratory failure from underlying

    chronic obstructive pulmonary disease.

5. Chronic sigmoid diverticulosis.

6. Chronic obstructive pulmonary disease in an ex-smoker.

7. Obesity hypoventilation syndrome.

8. Chronic cor pulmonale.

9. Diabetes mellitus type 2, chronically on insulin.

10.Chronic low back pain from spinal stenosis.

11.Chronic bilateral lower extremity lymphedema.

12.Chronic urinary stress incontinence.

13.Gait dysfunction uses a walker.

14.Chronic lower extremity cellulitis.

15.Chronic restless leg syndrome.

16.History of splenectomy.

17.Thromboembolic lung disease felt to be less likely per Dr. Aragon.

18.Duodenal ulcer causing acute GI bleed and acute blood loss anemia.  The patient is

    now off Xarelto.



PLAN:

Patient is overall doing much better.  Has been switched over to oral Augmentin. Dose

of Solu-Medrol also to be scaled back further.  Overall prognosis is guarded.





MMODL / IJN: [id number] / Job#: [id number]

## 2018-02-25 NOTE — P.PN
Subjective


Progress Note Date: 02/25/18


Principal diagnosis: 


Acute metabolic encephalopathy and altered mental status with acute on chronic 

hypoxic respiratory failure, multifactorial








This is a 72-year-old female with history of advanced COPD, O2 dependent, 

history of biopsy-proven bronchiolitis obliterans with organizing pneumonia/

cryptogenic organizing pneumonia, history of morbid obesity, patient is 

normally on prednisone for her bronchiolitis obliterans with organizing 

pneumonia.  Patient was recently in the hospital for multiple pulmonary 

symptoms including shortness of breath, cough wheezing, and altered mental 

status secondary to metabolic encephalopathy.  During her last admission which 

was only a week ago, patient had acute on chronic hypoxic respiratory failure, 

mild congestive heart failure, and bilateral pneumonia.  She also had acute on 

chronic renal failure, hyperchloremic metabolic alkalosis, and underlying COPD.

  The patient was discharged to rehab facility on 2/16/2018, and she is back 

here today complaining of similar presentation mostly fever, mental status 

change, and confusion, and intermittent cough wheezing shortness of breath.  

Chest x-ray actually is improved compared to the chest x-ray on the last 

admission, it did show multifocal pneumonia mostly in the upper lobes, and mild 

interstitial changes bilaterally suspicious for atypical pulmonary edema.  

Patchy airspace disease was noted in the right upper lobe and in the left 

midlung.  Considering the findings on the chest x-ray, I was asked to see the 

patient on consultation.  Patient is a poor historian, she has no clue why she 

was admitted to the hospital, seems to be a bit confused.  CBC showed a bit of 

leukocytosis with WBC count of 15.4, hemoglobin is 8.6, however her admission 

hemoglobin was 6.4, the patient received already 1 unit of packed RBCs.  Final 

screen was negative.





The patient is seen again today 02/22/2018 in follow-up on the [facility] 

unit.  She is awake and alert in no acute distress.  She is oriented 3.  She 

is currently sitting up in a chair at the bedside.  She is maintaining good O2 

saturations in the 90s on 4 L/m per nasal cannula.  She's been afebrile.  

Hemodynamically stable.  Blood culture reveals no growth.  Urine culture 

reveals no growth.  White count 11.2.  Hemoglobin 8.4.  Creatinine 1.36.  GI 

services on the case regarding her anemia and possible EGD planned to rule out 

peptic ulcer disease.  Anticoagulations is on hold.





The patient is seen again today 02/23/2018 in follow-up on the [facility] care 

unit.  She is awake and alert in no acute distress.  She denies any worsening 

shortness of breath, cough or congestion.  His maintaining good O2 saturations 

in the 90s on 4 L/m per nasal cannula.  She's afebrile.  Hemodynamically 

stable.  Blood and urine cultures reveal no growth to date.  Plan is for EGD 

today.





Patient was reevaluated today on 2/24/2018, patient is basically about the same

, continues to have intermittent episodes of cough wheezing shortness of breath

, and intermittent episodes of confusion.  EGD yesterday was noted, CBC is 

relatively normal except for hemoglobin of 8.4, BUN is 41 and creatinine is 

1.40.  Chest x-ray continues to show bilateral airspace disease, consistent 

with previously diagnosed bronchiolitis obliterans with organizing pneumonia.





Patient was reevaluated today on 2/25/2018, clinically, the patient is feeling 

much per her, breathing a lot easier, hardly any cough, no wheezing, and 

significant improvement in her shortness of breath noted.  Chest x-ray 

continues to show bilateral airspace disease, but improved compared to her 

admission chest x-ray.  Not to mention that the patient is clinically feeling 

much better today.  All her labs were reviewed, BUN is 41 creatinine 1.40 blood 

sugar is 190.  WBC count is 13.3.





Objective





- Vital Signs


Vital signs: 


 Vital Signs











Temp  97.2 F L  02/25/18 08:00


 


Pulse  84   02/25/18 09:10


 


Resp  18   02/25/18 08:00


 


BP  134/63   02/25/18 08:00


 


Pulse Ox  93 L  02/25/18 08:00








 Intake & Output











 02/24/18 02/25/18 02/25/18





 18:59 06:59 18:59


 


Weight  119.2 kg 


 


Other:   


 


  Voiding Method  Bedside Commode 





  Bedpan 





  Diaper 














- Exam








Patient is awake, in no distress.  On nasal cannula 4 L nasal cannula 





HEENT examination is grossly unremarkable.  Mucous membranes are moist.  No 

oral lesions.





Neck supple.  Full range of motion.  No adenopathy thyromegaly or neck vein 

distention.





Cardiovascular examination reveals regular rhythm rate.  S1-S2 normal.  No S3 

or S4.  No discernible murmur noted.





Lungs diminished breath sounds at the bases, minimal wheezing on forced 

expiratory maneuver only, much improved compared to yesterday.  Her chest wall 

tenderness.





Abdomen soft bowel sounds are heard.  No masses or tenderness.  Abdomen is 

obese.





Extremities are intact.  There is chronic venous stasis changes and some mild 

lower extremities edema 





Skin is without rash or lesion.





Neurologic examination revealed 72-year-old slightly confused, unaware why she 

is in the hospital, otherwise no gross focal neurologic deficit





Lymphatics: No lymphadenopathy





Psychiatric: Blunted mood and affect, poor judgment and insight at this point.








- Labs


CBC & Chem 7: 


 02/24/18 06:07





 02/24/18 06:07


Labs: 


 Abnormal Lab Results - Last 24 Hours (Table)











  02/24/18 02/24/18 02/24/18 Range/Units





  11:57 16:48 20:48 


 


POC Glucose (mg/dL)  299 H  296 H  268 H  (75-99)  mg/dL














  02/25/18 02/25/18 Range/Units





  06:07 11:59 


 


POC Glucose (mg/dL)  227 H  322 H  (75-99)  mg/dL








 Microbiology - Last 24 Hours (Table)











 02/20/18 21:30 Blood Culture - Preliminary





 Blood    No Growth after 96 hours














Assessment and Plan


Assessment: 





1.  Acute altered mental status, secondary to metabolic encephalopathy





2. Acute on chronic hypoxic respiratory failure , multifactorial secondary to 

mild congestive heart failure, COPD, and bronchiolitis obliterans with 

organizing pneumonia/boop/cryptogenic organizing pneumonia.





3.  Acute on chronic anemia most likely secondary to GI blood losses secondary 

to Xarelto.





4 Recent admission for COPD exacerbation complicated by purulent 

tracheobronchitis/bronchopneumonia involving the upper lobes





5 Biopsy-proven bronchiolitis obliterans organizing pneumonia





7. Microcirculatory pulmonary arteriopathy/thrombi, documented on previous 

scanning of the chest, clinical significance of which is not clear at this point

, but Xarelto will be placed on hold.





8. Obesity





9. Uterine cancer





10. Diabetes mellitus





11. Gastroesophageal reflux disease





12. Hypertension





13. Hyperlipidemia





14. Restless leg syndrome





15. Migraine cephalgia





16. Status post VATS lung biopsy





17 possible underlying healthcare acquired pneumonia.  However I believe the 

findings on the chest are mostly related to bronchiolitis obliterans with 

organizing pneumonia.





Recommendation: Continue present treatment plan including broad-spectrum 

antibiotics, high doses of Solu-Medrol, diuretics, bronchodilators, consider 

discharge planning in the next 24-48 hours


Time with Patient: Less than 30

## 2018-02-26 VITALS — RESPIRATION RATE: 16 BRPM

## 2018-02-26 VITALS — SYSTOLIC BLOOD PRESSURE: 142 MMHG | TEMPERATURE: 97.1 F | DIASTOLIC BLOOD PRESSURE: 68 MMHG

## 2018-02-26 VITALS — HEART RATE: 86 BPM

## 2018-02-26 LAB
ANION GAP SERPL CALC-SCNC: 8 MMOL/L
BUN SERPL-SCNC: 43 MG/DL (ref 7–17)
CALCIUM SPEC-MCNC: 8.7 MG/DL (ref 8.4–10.2)
CHLORIDE SERPL-SCNC: 112 MMOL/L (ref 98–107)
CO2 SERPL-SCNC: 28 MMOL/L (ref 22–30)
GLUCOSE BLD-MCNC: 208 MG/DL (ref 75–99)
GLUCOSE BLD-MCNC: 226 MG/DL (ref 75–99)
GLUCOSE BLD-MCNC: 241 MG/DL (ref 75–99)
GLUCOSE SERPL-MCNC: 215 MG/DL (ref 74–99)
POTASSIUM SERPL-SCNC: 4.2 MMOL/L (ref 3.5–5.1)
SODIUM SERPL-SCNC: 148 MMOL/L (ref 137–145)

## 2018-02-26 RX ADMIN — INSULIN ASPART SCH UNIT: 100 INJECTION, SOLUTION INTRAVENOUS; SUBCUTANEOUS at 17:39

## 2018-02-26 RX ADMIN — CEFAZOLIN SCH: 330 INJECTION, POWDER, FOR SOLUTION INTRAMUSCULAR; INTRAVENOUS at 18:00

## 2018-02-26 RX ADMIN — IPRATROPIUM BROMIDE AND ALBUTEROL SULFATE SCH ML: .5; 3 SOLUTION RESPIRATORY (INHALATION) at 06:58

## 2018-02-26 RX ADMIN — IPRATROPIUM BROMIDE AND ALBUTEROL SULFATE SCH ML: .5; 3 SOLUTION RESPIRATORY (INHALATION) at 10:42

## 2018-02-26 RX ADMIN — INSULIN ASPART SCH: 100 INJECTION, SOLUTION INTRAVENOUS; SUBCUTANEOUS at 05:19

## 2018-02-26 RX ADMIN — INSULIN ASPART SCH UNIT: 100 INJECTION, SOLUTION INTRAVENOUS; SUBCUTANEOUS at 17:40

## 2018-02-26 RX ADMIN — INSULIN ASPART SCH UNIT: 100 INJECTION, SOLUTION INTRAVENOUS; SUBCUTANEOUS at 13:04

## 2018-02-26 RX ADMIN — CEFAZOLIN SCH MLS/HR: 330 INJECTION, POWDER, FOR SOLUTION INTRAMUSCULAR; INTRAVENOUS at 00:22

## 2018-02-26 RX ADMIN — MUPIROCIN SCH APPLIC: 20 OINTMENT TOPICAL at 13:04

## 2018-02-26 RX ADMIN — INSULIN ASPART SCH UNIT: 100 INJECTION, SOLUTION INTRAVENOUS; SUBCUTANEOUS at 08:35

## 2018-02-26 RX ADMIN — INSULIN ASPART SCH: 100 INJECTION, SOLUTION INTRAVENOUS; SUBCUTANEOUS at 08:37

## 2018-02-26 RX ADMIN — CEFAZOLIN SCH MLS/HR: 330 INJECTION, POWDER, FOR SOLUTION INTRAMUSCULAR; INTRAVENOUS at 08:39

## 2018-02-26 RX ADMIN — PANTOPRAZOLE SODIUM SCH MG: 40 TABLET, DELAYED RELEASE ORAL at 08:37

## 2018-02-26 RX ADMIN — BUDESONIDE SCH MG: 1 SUSPENSION RESPIRATORY (INHALATION) at 06:58

## 2018-02-26 RX ADMIN — MUPIROCIN SCH APPLIC: 20 OINTMENT TOPICAL at 08:39

## 2018-02-26 RX ADMIN — IPRATROPIUM BROMIDE AND ALBUTEROL SULFATE SCH ML: .5; 3 SOLUTION RESPIRATORY (INHALATION) at 15:50

## 2018-02-26 RX ADMIN — AMOXICILLIN AND CLAVULANATE POTASSIUM SCH EACH: 500; 125 TABLET, FILM COATED ORAL at 08:37

## 2018-02-26 NOTE — DS
DISCHARGE SUMMARY



DATE OF ADMISSION:

02/20/2018



DATE OF DISCHARGE:

02/26/2018



FINAL DIAGNOSES:

1. Acute exacerbation of biopsy-proven bronchiolitis obliterans organizing pneumonia.

2. Possible bacterial pneumonia suspect gram-negative organism, present on admission.

3. Acute on chronic metabolic encephalopathy from above.

4. Acute on chronic hypoxic and hypercapnic respiratory failure from underlying

    chronic obstructive pulmonary disease, present on admission.

5. Chronic sigmoid diverticulosis.

6. Chronic obstructive pulmonary disease in an ex-smoker.

7. Obesity hypoventilation syndrome.

8. Chronic cor pulmonale.

9. Diabetes mellitus type 2, chronically on insulin.

10.Chronic low back pain from spinal stenosis.

11.Chronic bilateral lower extremity lymphedema and chronic lower extremity

    cellulitis.

12.Chronic urinary stress incontinence.

13.Gait dysfunction uses a walker.

14.Chronic lower extremity cellulitis.

15.Chronic restless leg syndrome.

16.History of splenectomy.

17.Thromboembolic lung disease felt to be less likely per Dr. Aragon.

18.Acute blood loss anemia from duodenal ulcer from patient being on Xarelto now that

    has been taken off.

19.Chronic duodenitis.



CONSULTATION:

Dr. Aragon from Pulmonary; Dr. Schneider from GI.



HOSPITAL COURSE:

This is a patient who has got a known diagnosis of cryptogenic organizing pneumonia,

biopsy-proven presented rather lethargic, felt to be metabolic encephalopathy, which

did actually improve.  Also, had _____pneumonia, did respond to IV antibiotics.  By the

time of discharge, patient is tolerating a diet.  Awake, alert.  Patient did drop per

hemoglobin and was having black stools and hemoglobin was down to 6.4.  The patient did

receive a unit of blood.  By the time of discharge, hemoglobin was 8.4.  The patient

did undergo an EGD by Dr. Schneider and patient was found to have a duodenal ulcer.  Xarelto

was discontinued.  It is impression of Dr. Aragon that patient has minimal

thromboembolic and should not be getting Xarelto any further.



ON EXAM:

LUNGS: Decreased breath sounds.

PSYCH:  A and O x3.

The patient is on 3 L of oxygen.



DISCHARGE MEDICATIONS:

1. Tudorza 1 puff b.i.d.

2. Xalatan 0.005% 1 drop to both eyes q.h.s.

3. Ferrous sulfate 325 p.o. b.i.d.

4. Melatonin 5 mg q.h.s.

5. Potassium 10 mEq p.o. daily.

6. Requip 0.25 p.o. b.i.d.

7. Breo Ellipta 100/25 one puff daily.

8. Humalog 5 units a.c. t.i.d. also insulin Basaglar 15 units subcu q.h.s.

9. Vitamin D3, 1000 units p.o. daily.

10.Silvadene cream topical q.h.s.

11.Requip 0.5 p.o. q.h.s.

12.Pulmicort 1 mg nebulizer b.i.d.

13.Nitrostat 0.4 sublingual q.5 p.r.n.

14.Tylenol 650 mg q.4 p.r.n.

15.Ventolin HFA 2 puffs q.4 p.r.n.

16.Lasix 40 mg p.o. b.i.d.

17.DuoNeb q.i.d.

18.Bactroban 2% topical t.i.d.

19.Augmentin 500 one tablet p.o. b.i.d. 6 tablets.

20.NovoLog per scale.

21.Protonix 40 mg b.i.d.

22.Prednisone 40 mg daily to be maintained.

Follow up with Dr. Hernández at the [facility].



Follow up with Dr. Chino in 1 week.



DISPOSITION:

[facility].



CONDITION ON DISCHARGE:

Stable.





MMODL / IJN: [id number] / Job#: [id number]

## 2018-02-26 NOTE — P.PN
Subjective


Progress Note Date: 02/26/18


Principal diagnosis: 





Shortness of breath, mental status changes





Progress note dated 02/26/2018





This is a 72-year-old female well-known to me.  She apparently was admitted a 

few days ago with mental status changes.  She has a history of acute on chronic 

hypoxemic respiratory failure secondary to bronchiolitis obliterans organizing 

pneumonia/cryptogenic organizing pneumonia.  In addition, she has a history of 

multiple small pulmonary thrombi throughout the pulmonary circulation.  The 

patient is on prednisone 30 mg a day.  In past, she refused higher doses of 

prednisone, which is  part of her problem.  Overall situation though is poor.  

She's had multiple admissions to the hospital discharge as to the nursing 

facility only to come back to hospital a few days.  The patient also suffers 

from COPD obesity GI bleed uterine cancer diabetes GERD hypertension 

hyperlipidemia and restless leg syndrome migraine status post VATS lung biopsy 

and possible healthcare acquired pneumonia.  The patient may be discharged 

again today.  She may be going back to the nursing home.  She seemed relatively 

stable.  Overall prognosis  is poor.  She is on oxygen therapy.  She has 

significant medical debility.








Objective





- Vital Signs


Vital signs: 


 Vital Signs











Temp  96.9 F L  02/26/18 05:25


 


Pulse  88   02/26/18 10:56


 


Resp  16   02/26/18 05:25


 


BP  150/83   02/26/18 05:25


 


Pulse Ox  95   02/26/18 05:25








 Intake & Output











 02/25/18 02/26/18 02/26/18





 18:59 06:59 18:59


 


Weight  135 kg 


 


Other:   


 


  Voiding Method  Bedside Commode 





  Bedpan 





  Diaper 


 


  # Voids  1 1














- Exam





No acute distress, oriented 3.  Nasal O2 in place





HEENT examination is grossly unremarkable.  Mucous membranes are moist.  No 

oral lesions.





Neck supple.  Full range of motion.  No adenopathy thyromegaly or neck vein 

distention.





Cardiovascular examination reveals regular rhythm rate.  S1-S2 normal.  No S3 

or S4.  No discernible murmur noted.





Lungs reveal mostly clear breath sounds.  Her sounds are equal bilaterally.  

There are a few scattered rhonchi and crackles.  





Abdomen soft bowel sounds are heard.  No masses or tenderness.





Extremities reveal significant edema and erythema hyperemia and fit chronic 

venous stasis changes with significant edema.





Skin is without rash or lesion.





Neurologic examination is brief but nonfocal.





- Labs


CBC & Chem 7: 


 02/24/18 06:07





 02/26/18 08:02


Labs: 


 Abnormal Lab Results - Last 24 Hours (Table)











  02/25/18 02/25/18 02/26/18 Range/Units





  17:04 21:14 07:09 


 


Sodium     (137-145)  mmol/L


 


Chloride     ()  mmol/L


 


BUN     (7-17)  mg/dL


 


Creatinine     (0.52-1.04)  mg/dL


 


Glucose     (74-99)  mg/dL


 


POC Glucose (mg/dL)  258 H  141 H  226 H  (75-99)  mg/dL














  02/26/18 02/26/18 Range/Units





  08:02 11:28 


 


Sodium  148 H   (137-145)  mmol/L


 


Chloride  112 H   ()  mmol/L


 


BUN  43 H   (7-17)  mg/dL


 


Creatinine  1.27 H   (0.52-1.04)  mg/dL


 


Glucose  215 H   (74-99)  mg/dL


 


POC Glucose (mg/dL)   241 H  (75-99)  mg/dL








 Microbiology - Last 24 Hours (Table)











 02/20/18 21:30 Blood Culture - Preliminary





 Blood    No Growth after 120 hours














Assessment and Plan


Assessment: 





Assessment





Mental status changes, likely secondary to metabolic encephalopathy, improved





Acute on chronic hypoxemic respiratory failure, multifactorial, in part related 

to heart failure COPD bronchiolitis obliterans organizing pneumonia and also 

microcirculatory pulmonary thrombus.





Acute on chronic anemia secondary to GI blood loss





Recent admission for COPD exacerbation and bronchopneumonia left upper lobe





Biopsy-proven bronchiolitis obliterans organizing pneumonia





Obesity





Uterine cancer





Diabetes mellitus





GERD





Hypertension





Hyperlipidemia





Restless leg syndrome





Migraine cephalgia





Status post VATS lung biopsy








Plan: 





Plan dated 02/26/2018





Patient possibly may be discharged today.  Not sure about that.  She likely 

will go back to the same nursing home she's been out for some time.  We did 

talk about a palliative care consult or hospice referral.  I'll speak to her 

 about that.  She should be discharged home on prednisone 30 mg a day.  

Because of the recent GI bleed, we can hold the blood thinner for now.  We'll 

may resume it at a later time.  Additional recommendations and suggestions are 

forthcoming.  Prognosis is poor.


Time with Patient: Less than 30

## 2018-03-07 ENCOUNTER — HOSPITAL ENCOUNTER (INPATIENT)
Dept: HOSPITAL 47 - EC | Age: 73
LOS: 5 days | Discharge: HOSPICE | DRG: 871 | End: 2018-03-12
Payer: MEDICARE

## 2018-03-07 DIAGNOSIS — G93.41: ICD-10-CM

## 2018-03-07 DIAGNOSIS — Z86.711: ICD-10-CM

## 2018-03-07 DIAGNOSIS — K29.80: ICD-10-CM

## 2018-03-07 DIAGNOSIS — Z85.42: ICD-10-CM

## 2018-03-07 DIAGNOSIS — N18.9: ICD-10-CM

## 2018-03-07 DIAGNOSIS — K21.9: ICD-10-CM

## 2018-03-07 DIAGNOSIS — Z86.14: ICD-10-CM

## 2018-03-07 DIAGNOSIS — Z87.891: ICD-10-CM

## 2018-03-07 DIAGNOSIS — J96.12: ICD-10-CM

## 2018-03-07 DIAGNOSIS — Z79.51: ICD-10-CM

## 2018-03-07 DIAGNOSIS — E11.22: ICD-10-CM

## 2018-03-07 DIAGNOSIS — J84.89: ICD-10-CM

## 2018-03-07 DIAGNOSIS — N39.3: ICD-10-CM

## 2018-03-07 DIAGNOSIS — Z66: ICD-10-CM

## 2018-03-07 DIAGNOSIS — G89.29: ICD-10-CM

## 2018-03-07 DIAGNOSIS — G25.81: ICD-10-CM

## 2018-03-07 DIAGNOSIS — E87.70: ICD-10-CM

## 2018-03-07 DIAGNOSIS — G43.909: ICD-10-CM

## 2018-03-07 DIAGNOSIS — J44.9: ICD-10-CM

## 2018-03-07 DIAGNOSIS — I95.9: ICD-10-CM

## 2018-03-07 DIAGNOSIS — I25.10: ICD-10-CM

## 2018-03-07 DIAGNOSIS — Z90.710: ICD-10-CM

## 2018-03-07 DIAGNOSIS — Z79.899: ICD-10-CM

## 2018-03-07 DIAGNOSIS — I12.9: ICD-10-CM

## 2018-03-07 DIAGNOSIS — R26.9: ICD-10-CM

## 2018-03-07 DIAGNOSIS — E55.9: ICD-10-CM

## 2018-03-07 DIAGNOSIS — Z98.42: ICD-10-CM

## 2018-03-07 DIAGNOSIS — D64.9: ICD-10-CM

## 2018-03-07 DIAGNOSIS — I27.21: ICD-10-CM

## 2018-03-07 DIAGNOSIS — Z74.01: ICD-10-CM

## 2018-03-07 DIAGNOSIS — K26.9: ICD-10-CM

## 2018-03-07 DIAGNOSIS — J96.11: ICD-10-CM

## 2018-03-07 DIAGNOSIS — Z79.4: ICD-10-CM

## 2018-03-07 DIAGNOSIS — Z99.81: ICD-10-CM

## 2018-03-07 DIAGNOSIS — Z51.5: ICD-10-CM

## 2018-03-07 DIAGNOSIS — K57.30: ICD-10-CM

## 2018-03-07 DIAGNOSIS — Z98.41: ICD-10-CM

## 2018-03-07 DIAGNOSIS — E87.2: ICD-10-CM

## 2018-03-07 DIAGNOSIS — M48.00: ICD-10-CM

## 2018-03-07 DIAGNOSIS — L03.116: ICD-10-CM

## 2018-03-07 DIAGNOSIS — Z79.52: ICD-10-CM

## 2018-03-07 DIAGNOSIS — Z87.01: ICD-10-CM

## 2018-03-07 DIAGNOSIS — R29.6: ICD-10-CM

## 2018-03-07 DIAGNOSIS — E66.2: ICD-10-CM

## 2018-03-07 DIAGNOSIS — N39.0: ICD-10-CM

## 2018-03-07 DIAGNOSIS — Z90.81: ICD-10-CM

## 2018-03-07 DIAGNOSIS — Z90.49: ICD-10-CM

## 2018-03-07 DIAGNOSIS — I27.81: ICD-10-CM

## 2018-03-07 DIAGNOSIS — A41.51: Primary | ICD-10-CM

## 2018-03-07 DIAGNOSIS — L03.115: ICD-10-CM

## 2018-03-07 DIAGNOSIS — Z86.59: ICD-10-CM

## 2018-03-07 DIAGNOSIS — N17.9: ICD-10-CM

## 2018-03-07 DIAGNOSIS — I89.0: ICD-10-CM

## 2018-03-07 DIAGNOSIS — E78.5: ICD-10-CM

## 2018-03-07 DIAGNOSIS — B37.0: ICD-10-CM

## 2018-03-07 LAB
ALBUMIN SERPL-MCNC: 3.3 G/DL (ref 3.5–5)
ALP SERPL-CCNC: 184 U/L (ref 38–126)
ALT SERPL-CCNC: 43 U/L (ref 9–52)
ANION GAP SERPL CALC-SCNC: 13 MMOL/L
APTT BLD: 21.6 SEC (ref 22–30)
AST SERPL-CCNC: 35 U/L (ref 14–36)
BUN SERPL-SCNC: 33 MG/DL (ref 7–17)
CALCIUM SPEC-MCNC: 7 MG/DL (ref 8.4–10.2)
CELLS COUNTED: 200
CHLORIDE SERPL-SCNC: 92 MMOL/L (ref 98–107)
CO2 SERPL-SCNC: 32 MMOL/L (ref 22–30)
ERYTHROCYTE [DISTWIDTH] IN BLOOD BY AUTOMATED COUNT: 3.77 M/UL (ref 3.8–5.4)
ERYTHROCYTE [DISTWIDTH] IN BLOOD: 18.3 % (ref 11.5–15.5)
GLUCOSE BLD-MCNC: 140 MG/DL (ref 75–99)
GLUCOSE SERPL-MCNC: 114 MG/DL (ref 74–99)
HCT VFR BLD AUTO: 33.8 % (ref 34–46)
HGB BLD-MCNC: 10.5 GM/DL (ref 11.4–16)
HYALINE CASTS UR QL AUTO: 54 /LPF (ref 0–2)
INR PPP: 1.1 (ref ?–1.2)
LYMPHOCYTES # BLD MANUAL: 2.18 K/UL (ref 1–4.8)
MCH RBC QN AUTO: 27.8 PG (ref 25–35)
MCHC RBC AUTO-ENTMCNC: 31 G/DL (ref 31–37)
MCV RBC AUTO: 89.7 FL (ref 80–100)
MONOCYTES # BLD MANUAL: 0.54 K/UL (ref 0–1)
NEUTROPHILS NFR BLD MANUAL: 73 %
NEUTS SEG # BLD MANUAL: 24.7 K/UL (ref 1.3–7.7)
PH UR: 5 [PH] (ref 5–8)
PLATELET # BLD AUTO: 206 K/UL (ref 150–450)
POTASSIUM SERPL-SCNC: 4 MMOL/L (ref 3.5–5.1)
PROT SERPL-MCNC: 6.4 G/DL (ref 6.3–8.2)
PROT UR QL: (no result)
PT BLD: 10.5 SEC (ref 9–12)
RBC UR QL: 1 /HPF (ref 0–5)
SODIUM SERPL-SCNC: 137 MMOL/L (ref 137–145)
SP GR UR: 1.01 (ref 1–1.03)
SQUAMOUS UR QL AUTO: 3 /HPF (ref 0–4)
UROBILINOGEN UR QL STRIP: <2 MG/DL (ref ?–2)
WBC # BLD AUTO: 27.2 K/UL (ref 3.8–10.6)
WBC #/AREA URNS HPF: 74 /HPF (ref 0–5)

## 2018-03-07 PROCEDURE — 85610 PROTHROMBIN TIME: CPT

## 2018-03-07 PROCEDURE — 96365 THER/PROPH/DIAG IV INF INIT: CPT

## 2018-03-07 PROCEDURE — 83735 ASSAY OF MAGNESIUM: CPT

## 2018-03-07 PROCEDURE — 76700 US EXAM ABDOM COMPLETE: CPT

## 2018-03-07 PROCEDURE — 80048 BASIC METABOLIC PNL TOTAL CA: CPT

## 2018-03-07 PROCEDURE — 81001 URINALYSIS AUTO W/SCOPE: CPT

## 2018-03-07 PROCEDURE — 87502 INFLUENZA DNA AMP PROBE: CPT

## 2018-03-07 PROCEDURE — 87205 SMEAR GRAM STAIN: CPT

## 2018-03-07 PROCEDURE — 99285 EMERGENCY DEPT VISIT HI MDM: CPT

## 2018-03-07 PROCEDURE — 96372 THER/PROPH/DIAG INJ SC/IM: CPT

## 2018-03-07 PROCEDURE — 83036 HEMOGLOBIN GLYCOSYLATED A1C: CPT

## 2018-03-07 PROCEDURE — 80053 COMPREHEN METABOLIC PANEL: CPT

## 2018-03-07 PROCEDURE — 85025 COMPLETE CBC W/AUTO DIFF WBC: CPT

## 2018-03-07 PROCEDURE — 87086 URINE CULTURE/COLONY COUNT: CPT

## 2018-03-07 PROCEDURE — 83605 ASSAY OF LACTIC ACID: CPT

## 2018-03-07 PROCEDURE — 71045 X-RAY EXAM CHEST 1 VIEW: CPT

## 2018-03-07 PROCEDURE — 93005 ELECTROCARDIOGRAM TRACING: CPT

## 2018-03-07 PROCEDURE — 87077 CULTURE AEROBIC IDENTIFY: CPT

## 2018-03-07 PROCEDURE — 94640 AIRWAY INHALATION TREATMENT: CPT

## 2018-03-07 PROCEDURE — 83880 ASSAY OF NATRIURETIC PEPTIDE: CPT

## 2018-03-07 PROCEDURE — 96367 TX/PROPH/DG ADDL SEQ IV INF: CPT

## 2018-03-07 PROCEDURE — 96368 THER/DIAG CONCURRENT INF: CPT

## 2018-03-07 PROCEDURE — 96366 THER/PROPH/DIAG IV INF ADDON: CPT

## 2018-03-07 PROCEDURE — 87040 BLOOD CULTURE FOR BACTERIA: CPT

## 2018-03-07 PROCEDURE — 82330 ASSAY OF CALCIUM: CPT

## 2018-03-07 PROCEDURE — 87186 SC STD MICRODIL/AGAR DIL: CPT

## 2018-03-07 PROCEDURE — 85027 COMPLETE CBC AUTOMATED: CPT

## 2018-03-07 PROCEDURE — 85730 THROMBOPLASTIN TIME PARTIAL: CPT

## 2018-03-07 PROCEDURE — 36415 COLL VENOUS BLD VENIPUNCTURE: CPT

## 2018-03-07 PROCEDURE — 96375 TX/PRO/DX INJ NEW DRUG ADDON: CPT

## 2018-03-07 PROCEDURE — 87070 CULTURE OTHR SPECIMN AEROBIC: CPT

## 2018-03-07 PROCEDURE — 82310 ASSAY OF CALCIUM: CPT

## 2018-03-07 RX ADMIN — IPRATROPIUM BROMIDE AND ALBUTEROL SULFATE SCH ML: .5; 3 SOLUTION RESPIRATORY (INHALATION) at 21:19

## 2018-03-07 RX ADMIN — LATANOPROST SCH DROPS: 50 SOLUTION OPHTHALMIC at 22:25

## 2018-03-07 RX ADMIN — DEXTROSE MONOHYDRATE SCH MLS/HR: 5 INJECTION, SOLUTION INTRAVENOUS at 22:30

## 2018-03-07 RX ADMIN — Medication SCH UNIT: at 08:44

## 2018-03-07 RX ADMIN — MAGNESIUM SULFATE IN DEXTROSE SCH MLS/HR: 10 INJECTION, SOLUTION INTRAVENOUS at 22:08

## 2018-03-07 RX ADMIN — NYSTATIN SCH UNIT: 100000 SUSPENSION ORAL at 22:27

## 2018-03-07 RX ADMIN — ACETAMINOPHEN PRN MG: 325 TABLET, FILM COATED ORAL at 10:41

## 2018-03-07 RX ADMIN — BUDESONIDE SCH MG: 1 SUSPENSION RESPIRATORY (INHALATION) at 21:28

## 2018-03-07 RX ADMIN — HEPARIN SODIUM SCH UNIT: 5000 INJECTION, SOLUTION INTRAVENOUS; SUBCUTANEOUS at 22:24

## 2018-03-07 RX ADMIN — PANTOPRAZOLE SODIUM SCH MG: 40 TABLET, DELAYED RELEASE ORAL at 08:44

## 2018-03-07 RX ADMIN — MUPIROCIN SCH: 20 OINTMENT TOPICAL at 14:13

## 2018-03-07 RX ADMIN — INSULIN ASPART SCH UNIT: 100 INJECTION, SOLUTION INTRAVENOUS; SUBCUTANEOUS at 23:00

## 2018-03-07 RX ADMIN — POTASSIUM CHLORIDE SCH MEQ: 750 TABLET, EXTENDED RELEASE ORAL at 08:45

## 2018-03-07 RX ADMIN — Medication SCH MG: at 22:24

## 2018-03-07 RX ADMIN — IPRATROPIUM BROMIDE AND ALBUTEROL SULFATE SCH ML: .5; 3 SOLUTION RESPIRATORY (INHALATION) at 16:34

## 2018-03-07 RX ADMIN — Medication SCH MG: at 08:44

## 2018-03-07 RX ADMIN — MUPIROCIN SCH: 20 OINTMENT TOPICAL at 10:03

## 2018-03-07 RX ADMIN — PANTOPRAZOLE SODIUM SCH MG: 40 TABLET, DELAYED RELEASE ORAL at 19:02

## 2018-03-07 RX ADMIN — IPRATROPIUM BROMIDE AND ALBUTEROL SULFATE SCH ML: .5; 3 SOLUTION RESPIRATORY (INHALATION) at 21:30

## 2018-03-07 RX ADMIN — MAGNESIUM SULFATE IN DEXTROSE SCH MLS/HR: 10 INJECTION, SOLUTION INTRAVENOUS at 21:52

## 2018-03-07 RX ADMIN — DEXTROSE MONOHYDRATE SCH MLS/HR: 5 INJECTION, SOLUTION INTRAVENOUS at 14:13

## 2018-03-07 RX ADMIN — IPRATROPIUM BROMIDE AND ALBUTEROL SULFATE SCH ML: .5; 3 SOLUTION RESPIRATORY (INHALATION) at 13:22

## 2018-03-07 RX ADMIN — BUDESONIDE SCH MG: 1 SUSPENSION RESPIRATORY (INHALATION) at 08:53

## 2018-03-07 RX ADMIN — MAGNESIUM SULFATE IN DEXTROSE SCH MLS/HR: 10 INJECTION, SOLUTION INTRAVENOUS at 22:07

## 2018-03-07 RX ADMIN — INSULIN DETEMIR SCH UNIT: 100 INJECTION, SOLUTION SUBCUTANEOUS at 22:24

## 2018-03-07 RX ADMIN — NYSTATIN SCH UNIT: 100000 SUSPENSION ORAL at 19:02

## 2018-03-07 RX ADMIN — HEPARIN SODIUM SCH UNIT: 5000 INJECTION, SOLUTION INTRAVENOUS; SUBCUTANEOUS at 08:44

## 2018-03-07 RX ADMIN — MUPIROCIN SCH APPLIC: 20 OINTMENT TOPICAL at 22:26

## 2018-03-07 RX ADMIN — Medication SCH MG: at 22:25
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
## 2018-03-07 NOTE — XR
EXAM:

  XR Chest, 1 View

 

CLINICAL HISTORY:

  ITS.REASON XR Reason: cough

 

TECHNIQUE:

  Frontal view of the chest.

 

COMPARISON:

  Chest x-ray dated 2/24/2018.

 

FINDINGS:

  Lungs:  Mildly prominent perihilar and right infrahilar opacities..  

Mildly prominent interstitial opacities diffusely.

  Pleural space:  Unremarkable.  No pneumothorax.

  Heart:  Unremarkable.  No cardiomegaly.

  Mediastinum:  See above.

  Bones/joints:  Unremarkable.

  Upper abdomen:  Asymmetric elevation of the left hemidiaphragm.

  Other findings:  Reidentified mild cardiomegaly.  

 

IMPRESSION:     

  Mildly prominent perihilar and right infrahilar opacities..  These are 

nonspecific but likely represent mild pulmonary congestion.  Underlying 

infection is possible.
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
## 2018-03-08 LAB
ANION GAP SERPL CALC-SCNC: 9 MMOL/L
BASOPHILS # BLD AUTO: 0 K/UL (ref 0–0.2)
BASOPHILS NFR BLD AUTO: 0 %
BUN SERPL-SCNC: 38 MG/DL (ref 7–17)
CALCIUM SPEC-MCNC: 6 MG/DL (ref 8.4–10.2)
CALCIUM SPEC-MCNC: 6.5 MG/DL (ref 8.4–10.2)
CHLORIDE SERPL-SCNC: 99 MMOL/L (ref 98–107)
CO2 SERPL-SCNC: 28 MMOL/L (ref 22–30)
EOSINOPHIL # BLD AUTO: 0 K/UL (ref 0–0.7)
EOSINOPHIL NFR BLD AUTO: 0 %
ERYTHROCYTE [DISTWIDTH] IN BLOOD BY AUTOMATED COUNT: 3.04 M/UL (ref 3.8–5.4)
ERYTHROCYTE [DISTWIDTH] IN BLOOD: 18.5 % (ref 11.5–15.5)
GLUCOSE BLD-MCNC: 137 MG/DL (ref 75–99)
GLUCOSE BLD-MCNC: 248 MG/DL (ref 75–99)
GLUCOSE BLD-MCNC: 278 MG/DL (ref 75–99)
GLUCOSE BLD-MCNC: 79 MG/DL (ref 75–99)
GLUCOSE SERPL-MCNC: 93 MG/DL (ref 74–99)
HBA1C MFR BLD: 7.7 % (ref 4–6)
HCT VFR BLD AUTO: 27.2 % (ref 34–46)
HGB BLD-MCNC: 8.3 GM/DL (ref 11.4–16)
LYMPHOCYTES # SPEC AUTO: 0.7 K/UL (ref 1–4.8)
LYMPHOCYTES NFR SPEC AUTO: 3 %
MAGNESIUM SPEC-SCNC: 1.9 MG/DL (ref 1.6–2.3)
MCH RBC QN AUTO: 27.4 PG (ref 25–35)
MCHC RBC AUTO-ENTMCNC: 30.6 G/DL (ref 31–37)
MCV RBC AUTO: 89.7 FL (ref 80–100)
MONOCYTES # BLD AUTO: 0.9 K/UL (ref 0–1)
MONOCYTES NFR BLD AUTO: 4 %
NEUTROPHILS # BLD AUTO: 23.2 K/UL (ref 1.3–7.7)
NEUTROPHILS NFR BLD AUTO: 93 %
PLATELET # BLD AUTO: 140 K/UL (ref 150–450)
POTASSIUM SERPL-SCNC: 3.1 MMOL/L (ref 3.5–5.1)
SODIUM SERPL-SCNC: 136 MMOL/L (ref 137–145)
WBC # BLD AUTO: 25.1 K/UL (ref 3.8–10.6)

## 2018-03-08 RX ADMIN — MUPIROCIN SCH APPLIC: 20 OINTMENT TOPICAL at 21:28

## 2018-03-08 RX ADMIN — NYSTATIN SCH UNIT: 100000 SUSPENSION ORAL at 13:29

## 2018-03-08 RX ADMIN — HEPARIN SODIUM SCH UNIT: 5000 INJECTION, SOLUTION INTRAVENOUS; SUBCUTANEOUS at 21:24

## 2018-03-08 RX ADMIN — INSULIN ASPART SCH: 100 INJECTION, SOLUTION INTRAVENOUS; SUBCUTANEOUS at 08:26

## 2018-03-08 RX ADMIN — POTASSIUM CHLORIDE SCH MEQ: 750 TABLET, EXTENDED RELEASE ORAL at 08:28

## 2018-03-08 RX ADMIN — INSULIN ASPART SCH UNIT: 100 INJECTION, SOLUTION INTRAVENOUS; SUBCUTANEOUS at 12:10

## 2018-03-08 RX ADMIN — Medication SCH MG: at 21:26

## 2018-03-08 RX ADMIN — INSULIN ASPART SCH UNIT: 100 INJECTION, SOLUTION INTRAVENOUS; SUBCUTANEOUS at 16:56

## 2018-03-08 RX ADMIN — LEVOFLOXACIN SCH MLS/HR: 750 INJECTION, SOLUTION INTRAVENOUS at 06:02

## 2018-03-08 RX ADMIN — IPRATROPIUM BROMIDE AND ALBUTEROL SULFATE SCH ML: .5; 3 SOLUTION RESPIRATORY (INHALATION) at 15:41

## 2018-03-08 RX ADMIN — DEXTROSE MONOHYDRATE SCH MLS/HR: 5 INJECTION, SOLUTION INTRAVENOUS at 21:22

## 2018-03-08 RX ADMIN — IPRATROPIUM BROMIDE AND ALBUTEROL SULFATE SCH ML: .5; 3 SOLUTION RESPIRATORY (INHALATION) at 08:25

## 2018-03-08 RX ADMIN — PANTOPRAZOLE SODIUM SCH MG: 40 TABLET, DELAYED RELEASE ORAL at 21:22

## 2018-03-08 RX ADMIN — LATANOPROST SCH DROPS: 50 SOLUTION OPHTHALMIC at 21:25

## 2018-03-08 RX ADMIN — BUDESONIDE SCH MG: 1 SUSPENSION RESPIRATORY (INHALATION) at 08:25

## 2018-03-08 RX ADMIN — IPRATROPIUM BROMIDE AND ALBUTEROL SULFATE SCH ML: .5; 3 SOLUTION RESPIRATORY (INHALATION) at 19:02

## 2018-03-08 RX ADMIN — METHYLPREDNISOLONE SODIUM SUCCINATE SCH MG: 125 INJECTION, POWDER, FOR SOLUTION INTRAMUSCULAR; INTRAVENOUS at 08:27

## 2018-03-08 RX ADMIN — HEPARIN SODIUM SCH UNIT: 5000 INJECTION, SOLUTION INTRAVENOUS; SUBCUTANEOUS at 08:29

## 2018-03-08 RX ADMIN — INSULIN DETEMIR SCH UNIT: 100 INJECTION, SOLUTION SUBCUTANEOUS at 21:23

## 2018-03-08 RX ADMIN — INSULIN ASPART SCH: 100 INJECTION, SOLUTION INTRAVENOUS; SUBCUTANEOUS at 12:08

## 2018-03-08 RX ADMIN — NYSTATIN SCH UNIT: 100000 SUSPENSION ORAL at 21:22

## 2018-03-08 RX ADMIN — NYSTATIN SCH UNIT: 100000 SUSPENSION ORAL at 08:28

## 2018-03-08 RX ADMIN — PANTOPRAZOLE SODIUM SCH MG: 40 TABLET, DELAYED RELEASE ORAL at 06:27

## 2018-03-08 RX ADMIN — Medication SCH UNIT: at 08:28

## 2018-03-08 RX ADMIN — IPRATROPIUM BROMIDE AND ALBUTEROL SULFATE SCH ML: .5; 3 SOLUTION RESPIRATORY (INHALATION) at 11:53

## 2018-03-08 RX ADMIN — DEXTROSE MONOHYDRATE SCH MLS/HR: 5 INJECTION, SOLUTION INTRAVENOUS at 13:30

## 2018-03-08 RX ADMIN — NYSTATIN SCH UNIT: 100000 SUSPENSION ORAL at 17:32

## 2018-03-08 RX ADMIN — INSULIN ASPART SCH UNIT: 100 INJECTION, SOLUTION INTRAVENOUS; SUBCUTANEOUS at 21:23

## 2018-03-08 RX ADMIN — BUDESONIDE SCH MG: 1 SUSPENSION RESPIRATORY (INHALATION) at 19:02

## 2018-03-08 RX ADMIN — FAMOTIDINE SCH MG: 10 INJECTION, SOLUTION INTRAVENOUS at 08:28

## 2018-03-08 RX ADMIN — MUPIROCIN SCH APPLIC: 20 OINTMENT TOPICAL at 08:29

## 2018-03-08 RX ADMIN — MUPIROCIN SCH APPLIC: 20 OINTMENT TOPICAL at 13:29

## 2018-03-08 RX ADMIN — DEXTROSE MONOHYDRATE SCH MLS/HR: 5 INJECTION, SOLUTION INTRAVENOUS at 06:01

## 2018-03-08 RX ADMIN — ACETAMINOPHEN PRN MG: 325 TABLET, FILM COATED ORAL at 13:28

## 2018-03-08 RX ADMIN — Medication SCH MG: at 08:28

## 2018-03-08 RX ADMIN — ACETAMINOPHEN PRN MG: 325 TABLET, FILM COATED ORAL at 02:00

## 2018-03-08 RX ADMIN — INSULIN ASPART SCH: 100 INJECTION, SOLUTION INTRAVENOUS; SUBCUTANEOUS at 07:37

## 2018-03-08 RX ADMIN — PANTOPRAZOLE SODIUM SCH MG: 40 TABLET, DELAYED RELEASE ORAL at 16:57

## 2018-03-08 RX ADMIN — METHYLPREDNISOLONE SODIUM SUCCINATE SCH: 125 INJECTION, POWDER, FOR SOLUTION INTRAMUSCULAR; INTRAVENOUS at 02:02

## 2018-03-08 NOTE — P.PN
Subjective


Progress Note Date: 03/08/18











A 72-year-old female patient mom morbidly obese, chronically steroid dependent 

due to history of biopsy proven bronchiolitis obliterans with organizing 

pneumonia in addition to chronic hypoxic respiratory failure related to COPD 

was had multiple hospitalizations for various medical problems and 

comorbidities including previous hospitalization for pneumonia and septicemia.  

The patient was treated for a MRSA septicemia back in general 2018.  

Subsequently the patient was hospitalized in February for a pneumonia and 

respiratory distress.  Ultimately patient was treated and the patient [place] 

[place] [facility] [place].  She was doing well 

yet she was not at the point where she was ambulating.  She was bedridden.  

Yesterday, there has been rapid decline in the patient's mentation.  The 

patient was confused and she was not aware of her surroundings.  She has 

chronic urinary incontinence.  No reported dysuria Or urgency.  No cough or 

sputum production above and beyond her baseline.  No reported pleurisy.  No 

reported chest pain.  The patient came into the hospital and she was quite ill.

  The initial vital signs showed that the patient was febrile with a 

temperature 100.3.  The patient was tachycardic at 136.  Her initial blood 

pressure was 196/68.  She had leukocytosis with a white cell count of 27.2.  

The patient also had an acute kidney injury with a creatinine of 1.7.  Lactic 

acid level was at 4.7 and was also elevated.  She had lower extremities 

cellulitis bilaterally.  Barton catheter was inserted and the urine was quite 

cloudy and turbid and the urinalysis was consistent with possible urinary tract 

infection knowing that there was increased white cell count and bacteria.  Note 

that the patient has been involved with a gram-negative infections in the past 

including a previous history of Citrobacter septicemia back in January 2018.  

Patient was assessed his IV fluids.  She was given 2 L of IV fluids on the 

spot.  She was given antibiotics and she is currently covered with a 

combination of Levaquin and Zosyn and vancomycin.  At time of my evaluation, 

the patient was much more alert and awake.  She was communicating and she was 

able to recognize me without any major difficulties.  She was moving all 4 

extremities.  I inspected the lower extremities and there is obvious changes of 

cellulitis with increased warmth, redness and heat.  No open wounds or 

ulcerations.  The patient had a chest x-ray that showed no significant 

worsening of the x-ray findings compared to previous chest x-ray and the 

findings are essentially stable.  As such a superimposed pneumonia is felt to 

be less likely.  She is in the emergency department for now and the patient is 

awaiting her bed on the medical floor.





On 03/08/2018, the patient is being seen in the medical floor.  The patient was 

seen yesterday in the emergency department.  She was septic.  She was febrile.  

She was having altered mentation.  She is improved considerably.  She is going 

gram-negative bacillus in her urine and in her blood and as such the patient 

has a underlying urine checked infection with a gram-negative microorganism and 

secondary sepsis.  The patient also has a right-sided lower extremities.  The 

legs today are less swollen and less erythematous and less tender and less warm 

compared to yesterday.  As such there is significant improvement in her 

condition.  She remains on a combination of Zosyn, Levaquin and vancomycin.  

The patient is also asking to be kept on her steroids.  Note that she has been 

steroid dependent regarding her bronchiolitis obliterans with organizing 

pneumonia.  I put at 40 mg however she wants to cut it further and I think she 

should farewell while being on 20 mg of prednisone.  The adjustments will be 

made today and she'll be monitored very closely.  Otherwise there has been no 

significant events overnight.  She is quite debilitated she's been essentially 

non-ambulatory for quite some time.  Oxygen levels are above 90% at 40 to 

oxygen nasal cannula.








Objective





- Vital Signs


Vital signs: 


 Vital Signs











Temp  97.1 F L  03/08/18 11:26


 


Pulse  108 H  03/08/18 12:09


 


Resp  24   03/08/18 11:27


 


BP  104/64   03/08/18 11:26


 


Pulse Ox  96   03/08/18 11:26








 Intake & Output











 03/07/18 03/08/18 03/08/18





 18:59 06:59 18:59


 


Intake Total  350 460


 


Output Total 0 1100 


 


Balance 0 -750 460


 


Weight  122 kg 


 


Intake:   


 


  Intake, IV Titration  350 400





  Amount   


 


    Levofloxacin 750Mg-D5w   150





    Pmx 750 mg In Dextrose/   





    Water 1 150ml.bag @ 100   





    mls/hr IVPB Q48H NED Rx#:   





    [id number]   


 


    Magnesium Sulfate-D5w Pmx  300 





    1 gm In Dextrose/Water 1   





    100ml.bag @ 100 mls/hr   





    IVPB Q1H NED Rx#:   





    [id number]   


 


    Piperacillin-Tazobactam 3  50 





    .375 gm In Dextrose/Water   





    1 50ml.bag @ 12.5 mls/hr   





    IVPB Q8H NED Rx#:   





    [id number]   


 


    Vancomycin 1,750 mg In   250





    Sodium Chloride 0.9% 250   





    ml @ 125 mls/hr IVPB Q48H   





    [facility] Rx#:[id number]   


 


  Oral   60


 


Output:   


 


  Urine 0 1100 


 


Other:   


 


  Voiding Method Indwelling Catheter Indwelling Catheter Indwelling Catheter


 


  # Bowel Movements   1














- Exam











GENERAL EXAM:  obese.  Alert, active, comfortable in no apparent distress.  The 

patient is awake and the patient was able to follow commands and answer 

questions appropriately.


HEAD: Normocephalic.


EYES: Normal reaction of pupils, equal size.


NOSE: Clear with pink turbinates.


THROAT: No erythema or exudates.  Mallampati class IV and significant crowding 

of the posterior oropharynx pain the patient also has some oropharyngeal 

candidiasis.


NECK: No masses, no JVD.


CHEST: No chest wall deformity.


LUNGS: Equal air entry with no crackles, wheeze, rhonchi or dullness.  The 

patient is prolongation of expiratory phase of breathing and diffuse expiratory 

wheezes heard throughout the lung fields bilaterally.


CVS: S1 and S2 normal with no audible murmur, regular rhythm.


ABDOMEN: No hepatosplenomegaly, normal bowel sounds, no guarding or rigidity.


SPINE: No scoliosis or deformity


SKIN: There is a right fibular extremities bilaterally.


CENTRAL NERVOUS SYSTEM: No focal deficits, tone is normal in all 4 extremities.


EXTREMITIES: There is changes of chronic peripheral edema.  No clubbing, no 

cyanosis.  Peripheral pulses are intact.  Patient has her labs of the lower 

extremities bilaterally extending just above the ankles to the feet 

bilaterally.  No open wounds.  No draining ulcers.  Compared to yesterday 

evaluation, the patient's serum lites improving and the area that has been 

described is less warm and tender and hot.





- Labs


CBC & Chem 7: 


 03/08/18 03:49





 03/08/18 03:49


Labs: 


 Abnormal Lab Results - Last 24 Hours (Table)











  03/07/18 03/07/18 03/07/18 Range/Units





  03:45 20:37 20:55 


 


WBC     (3.8-10.6)  k/uL


 


RBC     (3.80-5.40)  m/uL


 


Hgb     (11.4-16.0)  gm/dL


 


Hct     (34.0-46.0)  %


 


MCHC     (31.0-37.0)  g/dL


 


RDW     (11.5-15.5)  %


 


Plt Count     (150-450)  k/uL


 


Neutrophils #     (1.3-7.7)  k/uL


 


Lymphocytes #     (1.0-4.8)  k/uL


 


Sodium     (137-145)  mmol/L


 


Potassium     (3.5-5.1)  mmol/L


 


BUN     (7-17)  mg/dL


 


Creatinine     (0.52-1.04)  mg/dL


 


POC Glucose (mg/dL)   140 H   (75-99)  mg/dL


 


Hemoglobin A1c  7.7 H    (4.0-6.0)  %


 


Plasma Lactic Acid Ian     (0.7-2.0)  mmol/L


 


Calcium     (8.4-10.2)  mg/dL


 


Magnesium    1.0 L*  (1.6-2.3)  mg/dL














  03/07/18 03/07/18 03/08/18 Range/Units





  23:56 23:56 03:49 


 


WBC     (3.8-10.6)  k/uL


 


RBC     (3.80-5.40)  m/uL


 


Hgb     (11.4-16.0)  gm/dL


 


Hct     (34.0-46.0)  %


 


MCHC     (31.0-37.0)  g/dL


 


RDW     (11.5-15.5)  %


 


Plt Count     (150-450)  k/uL


 


Neutrophils #     (1.3-7.7)  k/uL


 


Lymphocytes #     (1.0-4.8)  k/uL


 


Sodium    136 L  (137-145)  mmol/L


 


Potassium    3.1 L  (3.5-5.1)  mmol/L


 


BUN    38 H  (7-17)  mg/dL


 


Creatinine    1.90 H  (0.52-1.04)  mg/dL


 


POC Glucose (mg/dL)     (75-99)  mg/dL


 


Hemoglobin A1c     (4.0-6.0)  %


 


Plasma Lactic Acid Ian  3.4 H*    (0.7-2.0)  mmol/L


 


Calcium   6.5 L*  6.0 L*  (8.4-10.2)  mg/dL


 


Magnesium     (1.6-2.3)  mg/dL














  03/08/18 03/08/18 03/08/18 Range/Units





  03:49 03:49 11:41 


 


WBC  25.1 H*    (3.8-10.6)  k/uL


 


RBC  3.04 L    (3.80-5.40)  m/uL


 


Hgb  8.3 L D    (11.4-16.0)  gm/dL


 


Hct  27.2 L    (34.0-46.0)  %


 


MCHC  30.6 L    (31.0-37.0)  g/dL


 


RDW  18.5 H    (11.5-15.5)  %


 


Plt Count  140 L    (150-450)  k/uL


 


Neutrophils #  23.2 H    (1.3-7.7)  k/uL


 


Lymphocytes #  0.7 L    (1.0-4.8)  k/uL


 


Sodium     (137-145)  mmol/L


 


Potassium     (3.5-5.1)  mmol/L


 


BUN     (7-17)  mg/dL


 


Creatinine     (0.52-1.04)  mg/dL


 


POC Glucose (mg/dL)    208 H  (75-99)  mg/dL


 


Hemoglobin A1c     (4.0-6.0)  %


 


Plasma Lactic Acid Ian   3.0 H*   (0.7-2.0)  mmol/L


 


Calcium     (8.4-10.2)  mg/dL


 


Magnesium     (1.6-2.3)  mg/dL














  03/08/18 Range/Units





  11:59 


 


WBC   (3.8-10.6)  k/uL


 


RBC   (3.80-5.40)  m/uL


 


Hgb   (11.4-16.0)  gm/dL


 


Hct   (34.0-46.0)  %


 


MCHC   (31.0-37.0)  g/dL


 


RDW   (11.5-15.5)  %


 


Plt Count   (150-450)  k/uL


 


Neutrophils #   (1.3-7.7)  k/uL


 


Lymphocytes #   (1.0-4.8)  k/uL


 


Sodium   (137-145)  mmol/L


 


Potassium   (3.5-5.1)  mmol/L


 


BUN   (7-17)  mg/dL


 


Creatinine   (0.52-1.04)  mg/dL


 


POC Glucose (mg/dL)  137 H  (75-99)  mg/dL


 


Hemoglobin A1c   (4.0-6.0)  %


 


Plasma Lactic Acid Ian   (0.7-2.0)  mmol/L


 


Calcium   (8.4-10.2)  mg/dL


 


Magnesium   (1.6-2.3)  mg/dL








 Microbiology - Last 24 Hours (Table)











 03/07/18 05:07 Urine Culture - Preliminary





 Urine,Catheterized    Gram Neg Bacilli


 


 03/07/18 16:40 Gram Stain - Preliminary





 Sputum Sputum Culture - Preliminary


 


 03/07/18 03:45 Blood Culture Gram Stain - Preliminary





 Blood Blood Culture - Preliminary





    Gram Neg Bacilli


 


 03/07/18 03:45 Blood Culture - Final





 Blood 














Assessment and Plan


Plan: 








Assessment





1 acute urinary tract infection, with a gram-negative bacillus.  The patient is 

septic.  The patient has positive blood culture with gram-negative bacillus 

originating from a urine checked infection.





2 cellulitis of the lower extremities bilaterally





3 altered mentation secondary to septicemia.  The patient presented with 

tachycardia, fever, tachypnea, altered mentation and she has at least 2 source 

of infection which is a urine checked infection bilateral lower eczematous 

cellulitis.  Improved with fluid resuscitation and antibiotics and the mental 

status improved and normalized.





4 morbid obesity





5 COPD 





6 chronic hypoxic respiratory failure and the patient has been oxygen and 

steroid-dependent





7 bronchiolitis obliterans with organizing pneumonia maintained on systemic 

steroids





8 chronic steroid use with ongoing weight gain





9 microcirculatory pulmonary emboli and the patient was taken Xarelto in the 

past and this was discontinued.





10 restless leg syndrome





11 chronic lower extremity edema along with episodes of cellulitis





12 chronic urinary incontinence





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





14 chronic diverticulosis





15 oropharyngeal thrush





16 preserved LV function with ejection fraction of 55% and mild-to-moderate 

pulmonary hypertension based on the most recent echocardiogram





17 previous history of GI bleeding





18 patient was at nursing home with very poor baseline performance and 

functional status secondary above-mentioned comorbidities





Plan





Clinically improved.  Hemodynamically stable.  Lactic acid level is improved.  

Blood cultures showing gram-negative bacillus.  Urine culture showing gram 

negative bacillus.  The patient is septic.  Continue current antibiotic 

coverage with a combination of Zosyn and Levaquin and vancomycin.  Cut down the 

prednisone down to 20 mg by mouth daily.  Monitor blood sugars.  He is a Barton 

catheter in place.  We'll continue to follow and will make further 

recommendations based on her progress.

## 2018-03-08 NOTE — XR
EXAMINATION TYPE: XR chest 1V portable

 

DATE OF EXAM: 3/8/2018

 

COMPARISON: 3/7/2018

 

HISTORY: Increased short of breath

 

TECHNIQUE: Single frontal view of the chest is obtained.

 

FINDINGS:  There is coarsening of pulmonary interstitial markings. Thoracic aorta is atheromatous. Th
ere is mild pulmonary interstitial edema. There are chest leads. There is some bulkiness of the pulmo
nary thiago. I see no pleural effusion.

 

IMPRESSION:  Pulmonary fibrosis and pulmonary interstitial edema appears slightly worse than yesterda
y. This could be mild heart failure or acute interstitial pneumonia..

## 2018-03-08 NOTE — PN
PROGRESS NOTE



DATE OF SERVICE:

03/08/2018



REASON FOR FOLLOWUP:

Gram-negative sepsis, likely source is urine plus complaint of cellulitis.



INTERVAL HISTORY:

The patient overall fever pattern has improved.  She is feeling slightly better.

Continues to complain of some shortness of breath.  No chest pain.  Occasional cough.

No abdominal pain.  No diarrhea.  No pain in her leg area.



EXAMINATION:

Blood pressure 106/60 with a pulse of 70, temperature 96.8.  She is 98% 3 L nasal

cannula.

General description is an elderly female up in the bed in no distress.

RESPIRATORY SYSTEM: Unlabored breathing, clear to auscultation anteriorly.

HEART:  S1, S2.  Regular rate and rhythm.

ABDOMEN:  Soft.

LEGS:  With some swelling.  The redness has improved.  There is no skin breakdown.  No

drainage.



LABS:

Hemoglobin 8.3, white count 25.1 with a BUN of 38, creatinine 1.90.



DIAGNOSTIC IMPRESSION AND PLAN:

Patient with gram-negative sepsis.  Source is likely urinary, history of growing some

gram-negative in the urine, as well as E. coli in the blood, which is sensitive to

Zosyn patient is on and resistant to Levaquin, which has been discontinued as no gram-

positive has been grown.  We will discontinue the vancomycin to decrease risk of

nephrotoxicity.  Monitor clinical course closely.  Continue supportive care.





MMODL / IJN: [id number] / Job#: [id number]

## 2018-03-09 LAB
ANION GAP SERPL CALC-SCNC: 10 MMOL/L
BUN SERPL-SCNC: 43 MG/DL (ref 7–17)
CALCIUM SPEC-MCNC: 6.5 MG/DL (ref 8.4–10.2)
CHLORIDE SERPL-SCNC: 100 MMOL/L (ref 98–107)
CO2 SERPL-SCNC: 27 MMOL/L (ref 22–30)
ERYTHROCYTE [DISTWIDTH] IN BLOOD BY AUTOMATED COUNT: 3.14 M/UL (ref 3.8–5.4)
ERYTHROCYTE [DISTWIDTH] IN BLOOD: 18.2 % (ref 11.5–15.5)
GLUCOSE BLD-MCNC: 137 MG/DL (ref 75–99)
GLUCOSE BLD-MCNC: 155 MG/DL (ref 75–99)
GLUCOSE BLD-MCNC: 177 MG/DL (ref 75–99)
GLUCOSE BLD-MCNC: 221 MG/DL (ref 75–99)
GLUCOSE SERPL-MCNC: 162 MG/DL (ref 74–99)
HCT VFR BLD AUTO: 27.9 % (ref 34–46)
HGB BLD-MCNC: 9 GM/DL (ref 11.4–16)
MAGNESIUM SPEC-SCNC: 1.9 MG/DL (ref 1.6–2.3)
MCH RBC QN AUTO: 28.7 PG (ref 25–35)
MCHC RBC AUTO-ENTMCNC: 32.3 G/DL (ref 31–37)
MCV RBC AUTO: 88.9 FL (ref 80–100)
PLATELET # BLD AUTO: 120 K/UL (ref 150–450)
POTASSIUM SERPL-SCNC: 4.7 MMOL/L (ref 3.5–5.1)
SODIUM SERPL-SCNC: 137 MMOL/L (ref 137–145)
WBC # BLD AUTO: 20.8 K/UL (ref 3.8–10.6)

## 2018-03-09 RX ADMIN — MUPIROCIN SCH APPLIC: 20 OINTMENT TOPICAL at 09:47

## 2018-03-09 RX ADMIN — HEPARIN SODIUM SCH UNIT: 5000 INJECTION, SOLUTION INTRAVENOUS; SUBCUTANEOUS at 21:26

## 2018-03-09 RX ADMIN — NYSTATIN SCH UNIT: 100000 SUSPENSION ORAL at 21:26

## 2018-03-09 RX ADMIN — BUDESONIDE SCH: 1 SUSPENSION RESPIRATORY (INHALATION) at 09:03

## 2018-03-09 RX ADMIN — Medication SCH MG: at 21:27

## 2018-03-09 RX ADMIN — IPRATROPIUM BROMIDE AND ALBUTEROL SULFATE SCH: .5; 3 SOLUTION RESPIRATORY (INHALATION) at 17:22

## 2018-03-09 RX ADMIN — POTASSIUM CHLORIDE SCH MEQ: 750 TABLET, EXTENDED RELEASE ORAL at 09:47

## 2018-03-09 RX ADMIN — MUPIROCIN SCH APPLIC: 20 OINTMENT TOPICAL at 12:24

## 2018-03-09 RX ADMIN — INSULIN ASPART SCH UNIT: 100 INJECTION, SOLUTION INTRAVENOUS; SUBCUTANEOUS at 12:25

## 2018-03-09 RX ADMIN — INSULIN ASPART SCH UNIT: 100 INJECTION, SOLUTION INTRAVENOUS; SUBCUTANEOUS at 06:50

## 2018-03-09 RX ADMIN — INSULIN ASPART SCH UNIT: 100 INJECTION, SOLUTION INTRAVENOUS; SUBCUTANEOUS at 17:31

## 2018-03-09 RX ADMIN — Medication SCH MG: at 21:26

## 2018-03-09 RX ADMIN — NYSTATIN SCH UNIT: 100000 SUSPENSION ORAL at 17:31

## 2018-03-09 RX ADMIN — Medication SCH UNIT: at 09:47

## 2018-03-09 RX ADMIN — INSULIN DETEMIR SCH UNIT: 100 INJECTION, SOLUTION SUBCUTANEOUS at 21:30

## 2018-03-09 RX ADMIN — HEPARIN SODIUM SCH UNIT: 5000 INJECTION, SOLUTION INTRAVENOUS; SUBCUTANEOUS at 09:47

## 2018-03-09 RX ADMIN — LATANOPROST SCH DROPS: 50 SOLUTION OPHTHALMIC at 21:26

## 2018-03-09 RX ADMIN — DEXTROSE MONOHYDRATE SCH MLS/HR: 5 INJECTION, SOLUTION INTRAVENOUS at 21:30

## 2018-03-09 RX ADMIN — FAMOTIDINE SCH MG: 10 INJECTION, SOLUTION INTRAVENOUS at 09:47

## 2018-03-09 RX ADMIN — IPRATROPIUM BROMIDE AND ALBUTEROL SULFATE SCH ML: .5; 3 SOLUTION RESPIRATORY (INHALATION) at 21:37

## 2018-03-09 RX ADMIN — NYSTATIN SCH UNIT: 100000 SUSPENSION ORAL at 12:24

## 2018-03-09 RX ADMIN — DEXTROSE MONOHYDRATE SCH MLS/HR: 5 INJECTION, SOLUTION INTRAVENOUS at 13:45

## 2018-03-09 RX ADMIN — INSULIN ASPART SCH UNIT: 100 INJECTION, SOLUTION INTRAVENOUS; SUBCUTANEOUS at 21:31

## 2018-03-09 RX ADMIN — BUDESONIDE SCH MG: 1 SUSPENSION RESPIRATORY (INHALATION) at 21:37

## 2018-03-09 RX ADMIN — INSULIN ASPART SCH: 100 INJECTION, SOLUTION INTRAVENOUS; SUBCUTANEOUS at 12:16

## 2018-03-09 RX ADMIN — ACETAMINOPHEN PRN MG: 325 TABLET, FILM COATED ORAL at 17:02

## 2018-03-09 RX ADMIN — PANTOPRAZOLE SODIUM SCH MG: 40 TABLET, DELAYED RELEASE ORAL at 17:34

## 2018-03-09 RX ADMIN — IPRATROPIUM BROMIDE AND ALBUTEROL SULFATE SCH ML: .5; 3 SOLUTION RESPIRATORY (INHALATION) at 11:57

## 2018-03-09 RX ADMIN — NYSTATIN SCH UNIT: 100000 SUSPENSION ORAL at 09:46

## 2018-03-09 RX ADMIN — INSULIN ASPART SCH: 100 INJECTION, SOLUTION INTRAVENOUS; SUBCUTANEOUS at 17:31

## 2018-03-09 RX ADMIN — MUPIROCIN SCH: 20 OINTMENT TOPICAL at 21:27

## 2018-03-09 RX ADMIN — Medication SCH MG: at 09:46

## 2018-03-09 RX ADMIN — HALOPERIDOL LACTATE PRN MG: 5 INJECTION, SOLUTION INTRAMUSCULAR at 21:37

## 2018-03-09 RX ADMIN — IPRATROPIUM BROMIDE AND ALBUTEROL SULFATE SCH: .5; 3 SOLUTION RESPIRATORY (INHALATION) at 09:03

## 2018-03-09 RX ADMIN — DEXTROSE MONOHYDRATE SCH MLS/HR: 5 INJECTION, SOLUTION INTRAVENOUS at 06:50

## 2018-03-09 RX ADMIN — INSULIN ASPART SCH UNIT: 100 INJECTION, SOLUTION INTRAVENOUS; SUBCUTANEOUS at 09:45

## 2018-03-09 RX ADMIN — HALOPERIDOL LACTATE PRN MG: 5 INJECTION, SOLUTION INTRAMUSCULAR at 15:39

## 2018-03-09 NOTE — PN
PROGRESS NOTE



DATE OF SERVICE:

03/09/2018.



REASON FOR FOLLOWUP:

E coli bacteremia, source is possible UTI versus pneumonia.



INTERVAL HISTORY:

The patient is afebrile.  She is breathing slightly comfortably.  She did have some

cough and is less productive now.  No nausea, vomiting, or any diarrhea.



EXAMINATION:

Her blood pressure is 130/70 with a pulse of 97, temperature 96.6.  She is 97% on 3 L

nasal cannula.

General description is an elderly female up in the bed in no distress.  HEENT

examination:  Pallor.  Oral mucosal membranes dry.  Lungs unlabored breathing.  Coarse

breath sounds bilaterally.  No wheeze.  Heart S1, S2.  Regular rate and rhythm.

Abdomen soft, no tenderness.  Bilaterally _____. No obvious drainage on the dressing.



LABS:

Hemoglobin 9, with hematocrit of 20.8.  BUN of 43, creatinine is 1.77.



DIAGNOSTIC IMPRESSION AND PLAN:

1. Patient with an E coli bacteremia, source questionably urine.  Pneumonia and she

    also brings gram-negative in the sputum as well.  She did have a ultrasound showing

    some limitation, but no evidence of any hydronephrosis or structure abnormality.

2. Patient with E coli bacteremia source, possible urinary urine has been positive or

    a question of possible pneumonia.  The patient to continue Zosyn over the weekend

    while waiting for her condition to stabilize.  However in view of the fact that the

    pathogen in the blood was _____resistant, she may need to continue with IV

    ceftriaxone on discharge keeping in mind the patient continues to improve.

Multiple family members present at bedside.  Their questions were answered.





MMODL / IJN: [id number] / Job#: [id number]

## 2018-03-09 NOTE — P.PN
Subjective


Progress Note Date: 03/09/18











A 72-year-old female patient mom morbidly obese, chronically steroid dependent 

due to history of biopsy proven bronchiolitis obliterans with organizing 

pneumonia in addition to chronic hypoxic respiratory failure related to COPD 

was had multiple hospitalizations for various medical problems and 

comorbidities including previous hospitalization for pneumonia and septicemia.  

The patient was treated for a MRSA septicemia back in general 2018.  

Subsequently the patient was hospitalized in February for a pneumonia and 

respiratory distress.  Ultimately patient was treated and the patient was 

[place] [facility] [place].  She was doing well 

yet she was not at the point where she was ambulating.  She was bedridden.  

Yesterday, there has been rapid decline in the patient's mentation.  The 

patient was confused and she was not aware of her surroundings.  She has 

chronic urinary incontinence.  No reported dysuria Or urgency.  No cough or 

sputum production above and beyond her baseline.  No reported pleurisy.  No 

reported chest pain.  The patient came into the hospital and she was quite ill.

  The initial vital signs showed that the patient was febrile with a 

temperature 100.3.  The patient was tachycardic at 136.  Her initial blood 

pressure was 196/68.  She had leukocytosis with a white cell count of 27.2.  

The patient also had an acute kidney injury with a creatinine of 1.7.  Lactic 

acid level was at 4.7 and was also elevated.  She had lower extremities 

cellulitis bilaterally.  Barton catheter was inserted and the urine was quite 

cloudy and turbid and the urinalysis was consistent with possible urinary tract 

infection knowing that there was increased white cell count and bacteria.  Note 

that the patient has been involved with a gram-negative infections in the past 

including a previous history of Citrobacter septicemia back in January 2018.  

Patient was assessed his IV fluids.  She was given 2 L of IV fluids on the 

spot.  She was given antibiotics and she is currently covered with a 

combination of Levaquin and Zosyn and vancomycin.  At time of my evaluation, 

the patient was much more alert and awake.  She was communicating and she was 

able to recognize me without any major difficulties.  She was moving all 4 

extremities.  I inspected the lower extremities and there is obvious changes of 

cellulitis with increased warmth, redness and heat.  No open wounds or 

ulcerations.  The patient had a chest x-ray that showed no significant 

worsening of the x-ray findings compared to previous chest x-ray and the 

findings are essentially stable.  As such a superimposed pneumonia is felt to 

be less likely.  She is in the emergency department for now and the patient is 

awaiting her bed on the medical floor.





On 03/08/2018, the patient is being seen in the medical floor.  The patient was 

seen yesterday in the emergency department.  She was septic.  She was febrile.  

She was having altered mentation.  She is improved considerably.  She is going 

gram-negative bacillus in her urine and in her blood and as such the patient 

has a underlying urine checked infection with a gram-negative microorganism and 

secondary sepsis.  The patient also has a right-sided lower extremities.  The 

legs today are less swollen and less erythematous and less tender and less warm 

compared to yesterday.  As such there is significant improvement in her 

condition.  She remains on a combination of Zosyn, Levaquin and vancomycin.  

The patient is also asking to be kept on her steroids.  Note that she has been 

steroid dependent regarding her bronchiolitis obliterans with organizing 

pneumonia.  I put at 40 mg however she wants to cut it further and I think she 

should farewell while being on 20 mg of prednisone.  The adjustments will be 

made today and she'll be monitored very closely.  Otherwise there has been no 

significant events overnight.  She is quite debilitated she's been essentially 

non-ambulatory for quite some time.  Oxygen levels are above 90% at 40 to 

oxygen nasal cannula.





On 03/09/2080 I'm seeing this patient for a follow-up.  The patient was 

hospitalized for a sepsis secondary to a gram-negative urine checked infection 

that to turn out to be E. coli.  She was stable and she got moved to a medical 

floor where she was seen yesterday and she was doing very well.  This afternoon 

the patient was found to be confused and restless and lethargic.  She was felt 

to be going into fluid overload.  Based on that immediately the fluid was Down 

to KVO and the patient was given a dose of Lasix 40 mg IV push.  She is on 3 

liters of oxygen by nasal cannula.  She is still broad-spectrum antibiotics 

with a combination of Zosyn and Levaquin and the patient's white cell count is 

improving and is currently down to 20.  She is chronically steroid dependent 

and was placed the patient on a 20 mg of prednisone as maintenance.  Rest of 

the medication remains unchanged.  She was somewhat delirious and confused and 

was started patient also on Haldol 1 mg every 4 hours on a when necessary 

basis.  A follow-up chest x-ray was also obtained and it showed that the heart 

appeared enlarged and there is evidence of pulmonary artery hypertension and 

chronic interstitial lung disease.  No airspace disease.  No pneumonias.  No 

other new abnormalities noted.








Objective





- Vital Signs


Vital signs: 


 Vital Signs











Temp  96.6 F L  03/09/18 15:44


 


Pulse  97   03/09/18 15:44


 


Resp  26 H  03/09/18 15:44


 


BP  130/70   03/09/18 15:44


 


Pulse Ox  97   03/09/18 15:44








 Intake & Output











 03/08/18 03/09/18 03/09/18





 18:59 06:59 18:59


 


Intake Total 790  170


 


Output Total 650 3600 125


 


Balance 140 -3600 45


 


Weight  125.5 kg 


 


Intake:   


 


  Intake, IV Titration 450  50





  Amount   


 


    Levofloxacin 750Mg-D5w 150  





    Pmx 750 mg In Dextrose/   





    Water 1 150ml.bag @ 100   





    mls/hr IVPB Q48H NED Rx#:   





    [id number]   


 


    Piperacillin-Tazobactam 3 50  50





    .375 gm In Dextrose/Water   





    1 50ml.bag @ 12.5 mls/hr   





    IVPB Q8H NED Rx#:   





    [id number]   


 


    Vancomycin 1,750 mg In 250  





    Sodium Chloride 0.9% 250   





    ml @ 125 mls/hr IVPB Q48H   





    NED Rx#:[id number]   


 


  Oral 340  120


 


Output:   


 


  Urine 650 3600 125


 


    Uretheral (Barton) 650 1950 125


 


Other:   


 


  Voiding Method Indwelling Catheter Indwelling Catheter Indwelling Catheter


 


  # Voids 2 2 


 


  # Bowel Movements 1  1














- Exam











GENERAL EXAM:  obese.  Alert, and she is somewhat delirious and confused..  The 

patient is awake and the patient was able to follow commands and answer 

questions appropriately.


HEAD: Normocephalic.


EYES: Normal reaction of pupils, equal size.


NOSE: Clear with pink turbinates.


THROAT: No erythema or exudates.  Mallampati class IV and significant crowding 

of the posterior oropharynx pain the patient also has some oropharyngeal 

candidiasis.


NECK: No masses, no JVD.


CHEST: No chest wall deformity.


LUNGS: Equal air entry with no crackles, wheeze, rhonchi or dullness.  The 

patient is prolongation of expiratory phase of breathing and diffuse expiratory 

wheezes heard throughout the lung fields bilaterally.


CVS: S1 and S2 normal with no audible murmur, regular rhythm.


ABDOMEN: No hepatosplenomegaly, normal bowel sounds, no guarding or rigidity.


SPINE: No scoliosis or deformity


SKIN: There is a right fibular extremities bilaterally.


CENTRAL NERVOUS SYSTEM: No focal deficits, tone is normal in all 4 extremities.

  Patient is delirious and confused.  She isn't verbal.  She is morbidly 4 

extremities.


EXTREMITIES: There is changes of chronic peripheral edema.  No clubbing, no 

cyanosis.  Peripheral pulses are intact.  Patient has her labs of the lower 

extremities bilaterally extending just above the ankles to the feet 

bilaterally.  No open wounds.  No draining ulcers.  Compared to yesterday 

evaluation, the patient's serum lites improving and the area that has been 

described is less warm and tender and hot.





- Labs


CBC & Chem 7: 


 03/09/18 05:29





 03/09/18 05:29


Labs: 


 Abnormal Lab Results - Last 24 Hours (Table)











  03/08/18 03/09/18 03/09/18 Range/Units





  21:05 05:29 05:29 


 


WBC    20.8 H  (3.8-10.6)  k/uL


 


RBC    3.14 L  (3.80-5.40)  m/uL


 


Hgb    9.0 L  (11.4-16.0)  gm/dL


 


Hct    27.9 L  (34.0-46.0)  %


 


RDW    18.2 H  (11.5-15.5)  %


 


Plt Count    120 L  (150-450)  k/uL


 


BUN   43 H   (7-17)  mg/dL


 


Creatinine   1.77 H   (0.52-1.04)  mg/dL


 


Glucose   162 H   (74-99)  mg/dL


 


POC Glucose (mg/dL)  248 H    (75-99)  mg/dL


 


Calcium   6.5 L*   (8.4-10.2)  mg/dL


 


Ionized Calcium Rosalio     (4.5-5.3)  mg/dL














  03/09/18 03/09/18 03/09/18 Range/Units





  06:15 10:10 12:24 


 


WBC     (3.8-10.6)  k/uL


 


RBC     (3.80-5.40)  m/uL


 


Hgb     (11.4-16.0)  gm/dL


 


Hct     (34.0-46.0)  %


 


RDW     (11.5-15.5)  %


 


Plt Count     (150-450)  k/uL


 


BUN     (7-17)  mg/dL


 


Creatinine     (0.52-1.04)  mg/dL


 


Glucose     (74-99)  mg/dL


 


POC Glucose (mg/dL)  177 H   221 H  (75-99)  mg/dL


 


Calcium     (8.4-10.2)  mg/dL


 


Ionized Calcium Rosalio   3.8 L   (4.5-5.3)  mg/dL














  03/09/18 Range/Units





  16:39 


 


WBC   (3.8-10.6)  k/uL


 


RBC   (3.80-5.40)  m/uL


 


Hgb   (11.4-16.0)  gm/dL


 


Hct   (34.0-46.0)  %


 


RDW   (11.5-15.5)  %


 


Plt Count   (150-450)  k/uL


 


BUN   (7-17)  mg/dL


 


Creatinine   (0.52-1.04)  mg/dL


 


Glucose   (74-99)  mg/dL


 


POC Glucose (mg/dL)  155 H  (75-99)  mg/dL


 


Calcium   (8.4-10.2)  mg/dL


 


Ionized Calcium Rosalio   (4.5-5.3)  mg/dL








 Microbiology - Last 24 Hours (Table)











 03/07/18 16:40 Gram Stain - Preliminary





 Sputum Sputum Culture - Preliminary





    Gram Neg Bacilli


 


 03/07/18 05:07 Urine Culture - Final





 Urine,Catheterized    Escherichia coli


 


 03/07/18 03:45 Blood Culture Gram Stain - Final





 Blood Blood Culture - Final





    Escherichia coli














Assessment and Plan


Plan: 








Assessment





1 acute sepsis secondary to a urinary tract infection with E. coli.  Blood 

culture was positive.  Urine cultures also positive.





2 cellulitis of the lower extremities bilaterally, improving





3 altered mentation secondary to septicemia.  The patient's mental status 

improved and subsequently she became more delirious.  She'll be started on 

Haldol.  Neurologic exam is nonfocal.





4 morbid obesity





5 COPD 





6 chronic hypoxic respiratory failure and the patient has been oxygen and 

steroid-dependent





7 bronchiolitis obliterans with organizing pneumonia maintained on systemic 

steroids, and the patient is currently on a 20 mg of prednisone a daily basis





8 chronic steroid use with ongoing weight gain





9 microcirculatory pulmonary emboli and the patient was taken Xarelto in the 

past and this was discontinued.





10 restless leg syndrome





11 chronic lower extremity edema along with episodes of cellulitis





12 chronic urinary incontinence





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





14 chronic diverticulosis





15 oropharyngeal thrush





16 preserved LV function with ejection fraction of 55% and mild-to-moderate 

pulmonary hypertension based on the most recent echocardiogram





17 previous history of GI bleeding





18 patient was at nursing home with very poor baseline performance and 

functional status secondary above-mentioned comorbidities





Plan





Continue Zosyn, Levaquin.  Chest x-ray was reviewed.  Kept on IV fluids to KVO.

  Give the patient a dose of 4 g of IV Lasix.  Haldol for altered mentation and 

delirium.  Continue 20 mg of prednisone.  Keep her on 3 through oxygen by nasal 

cannula.  Follow-up very closely and will continue to make further 

recommendations based on her progress.  Long-term prognosis poor based on her 

multiple medical problems and comorbidities.  She also has a very poor baseline 

performance and functional status.

## 2018-03-09 NOTE — US
EXAMINATION TYPE: US abdomen complete

 

DATE OF EXAM: 3/9/2018

 

COMPARISON: CT [id number]

 

CLINICAL HISTORY: UTI/Bacteremia,Abd pain.

 

EXAM MEASUREMENTS:

 

Liver Length:  14.3 cm   

Gallbladder Wall:  Surgically absent cm   

CBD:  0.5 cm

Spleen:  Surgically absent cm   

Right Kidney:  9.3 x 5.4 x 5.7 cm 

Left Kidney:  11.5 x 5.8 x 6.4 cm   

 

Exam is limited by body habitus, bowel gas, and patient's inability to cooperate with exam.

 

Pancreas:  not visualized due to bowel gas and body habitus

Liver:  limited vis due to patient body habitus   

Gallbladder:  Surgically absent

CBD:  wnl 

Spleen:  surgically absent   

Right Kidney:  No hydronephrosis or masses seen   

Left Kidney:  limited vis due to bowel gas and patient body habitus.   Cortical medullary differentia
tion is maintained bilaterally

Upper IVC:  wnl  

Abd Aorta:  not visualized due to body habitus and bowel gas

 

There is no ascites.

 

 

IMPRESSION: There are some limitations in exam. Consider alternate imaging for better evaluation as i
ndicated.

## 2018-03-09 NOTE — PN
PROGRESS NOTE



DATE OF SERVICE:

03/08/2018



PRESENTING COMPLAINT:

Tired.



INTERVAL HISTORY:

This patient was seen by me yesterday on 03/08/2018.  The patient had been admitted

with altered mental status, metabolic encephalopathy felt to be from UTI and sepsis.

Both urine and blood cultures are coming back positive.  The patient doing somewhat

better.  Appetite is improving.  Did tolerate some breakfast, though tired.  Sitting up

on the bed.



REVIEW OF SYSTEMS:

Review of systems done for constitutional, cardiovascular, GI, pulmonary; relevant

findings as above.



CURRENT MEDICATIONS:

Current medications are reviewed that include IV Zosyn and Levaquin.



PHYSICAL EXAMINATION:

On examination, temperature 97.1, pulse 86, respirations 24, blood pressure 104/64,

pulse ox 96% on 3 L.

GENERAL APPEARANCE:  Sitting on bed, tired-appearing, but awake.

EYES: Pupils equal. Conjunctivae normal.

HEENT:  External appearance of nose and ears normal. Oral cavity normal.

NECK: JVD unable to assess.  Mass not palpable.

RESPIRATORY: Effort normal.

LUNGS:  Diminished breath sounds.

CARDIOVASCULAR:  First and second sounds normal.  Mild edema.

ABDOMEN:  Soft, nontender. Liver and spleen not palpable.

PSYCHIATRY:  Alert and oriented x3.  Mood and affect are normal.

DERMATOLOGICAL:  Chronic cellulitis of both bilateral lower extremity.



ASSESSMENT:

1. Acute urinary tract infection with sepsis from gram-negative bacilli with blood

    cultures coming back positive that is sepsis present on admission.

2. Biopsy-proven bronchiolitis obliterans organizing pneumonia.

3. Acute on chronic metabolic encephalopathy from above infection.

4. Chronic hypoxic and hypercapnic respiratory failure from underlying chronic

    obstructive pulmonary disease.

5. Chronic sigmoid diverticulosis.

6. Chronic obstructive pulmonary disease in an ex-smoker.

7. Obesity hypoventilation syndrome.

8. Chronic cor pulmonale.

9. Diabetes mellitus type 2, chronically on insulin.

10.Chronic low back pain from spinal stenosis.

11.Chronic bilateral lower extremity lymphedema and chronic lower extremity

    cellulitis.

12.Chronic urinary stress incontinence.

13.Gait dysfunction, uses a walker.

14.Chronic restless legs syndrome.

15.History of splenectomy.

16.Thromboembolic disease felt to be less likely per Dr. Aragon and Xarelto was

    discontinued.

17.Duodenal ulcer with gastrointestinal bleed causing anemia on last admission, hence

    Xarelto was taken off.

18.Chronic duodenitis.



PLAN:

Continue current antibiotics.  Other medication and treatment plan.  Care was discussed

with the patient.  Await final cultures to come back.





MMODL / JEIMYN: [id number] / Job#: [id number]

## 2018-03-09 NOTE — XR
EXAMINATION TYPE: XR chest 1V

 

DATE OF EXAM: 3/9/2018

 

COMPARISON: Prior chest x-ray 3/8/2018 and chest CT 4/4/2017

 

HISTORY: Shortness of breath

 

TECHNIQUE: Single frontal view of the chest is obtained.

 

FINDINGS:  Heart size is stable, pulmonary artery and left hilum appears prominent. There are overlyi
ng cardiac leads. Patient is rotated. Minimal patchy perihilar density is again noted. No pneumothora
x or pleural effusion evident. Interstitial lung disease, areas of scarring suspected. There are over
lying cardiac leads.

 

IMPRESSION:  Heart appears enlarged, correlate for possible pulmonary artery hypertension. Interstiti
al lung disease.

## 2018-03-10 LAB
ANION GAP SERPL CALC-SCNC: 8 MMOL/L
BUN SERPL-SCNC: 40 MG/DL (ref 7–17)
CALCIUM SPEC-MCNC: 7.3 MG/DL (ref 8.4–10.2)
CHLORIDE SERPL-SCNC: 103 MMOL/L (ref 98–107)
CO2 SERPL-SCNC: 32 MMOL/L (ref 22–30)
ERYTHROCYTE [DISTWIDTH] IN BLOOD BY AUTOMATED COUNT: 3.15 M/UL (ref 3.8–5.4)
ERYTHROCYTE [DISTWIDTH] IN BLOOD: 18.7 % (ref 11.5–15.5)
GLUCOSE BLD-MCNC: 136 MG/DL (ref 75–99)
GLUCOSE BLD-MCNC: 146 MG/DL (ref 75–99)
GLUCOSE BLD-MCNC: 184 MG/DL (ref 75–99)
GLUCOSE BLD-MCNC: 256 MG/DL (ref 75–99)
GLUCOSE SERPL-MCNC: 136 MG/DL (ref 74–99)
HCT VFR BLD AUTO: 29 % (ref 34–46)
HGB BLD-MCNC: 8.8 GM/DL (ref 11.4–16)
MCH RBC QN AUTO: 28 PG (ref 25–35)
MCHC RBC AUTO-ENTMCNC: 30.4 G/DL (ref 31–37)
MCV RBC AUTO: 92.1 FL (ref 80–100)
PLATELET # BLD AUTO: 179 K/UL (ref 150–450)
POTASSIUM SERPL-SCNC: 3.6 MMOL/L (ref 3.5–5.1)
SODIUM SERPL-SCNC: 143 MMOL/L (ref 137–145)
WBC # BLD AUTO: 14.6 K/UL (ref 3.8–10.6)

## 2018-03-10 RX ADMIN — DEXTROSE MONOHYDRATE SCH MLS/HR: 5 INJECTION, SOLUTION INTRAVENOUS at 14:03

## 2018-03-10 RX ADMIN — INSULIN ASPART SCH: 100 INJECTION, SOLUTION INTRAVENOUS; SUBCUTANEOUS at 08:14

## 2018-03-10 RX ADMIN — INSULIN DETEMIR SCH UNIT: 100 INJECTION, SOLUTION SUBCUTANEOUS at 22:06

## 2018-03-10 RX ADMIN — INSULIN ASPART SCH UNIT: 100 INJECTION, SOLUTION INTRAVENOUS; SUBCUTANEOUS at 11:53

## 2018-03-10 RX ADMIN — Medication SCH MG: at 19:40

## 2018-03-10 RX ADMIN — POTASSIUM CHLORIDE SCH MEQ: 750 TABLET, EXTENDED RELEASE ORAL at 08:17

## 2018-03-10 RX ADMIN — LEVOFLOXACIN SCH MLS/HR: 750 INJECTION, SOLUTION INTRAVENOUS at 05:20

## 2018-03-10 RX ADMIN — Medication SCH MG: at 19:39

## 2018-03-10 RX ADMIN — LATANOPROST SCH DROPS: 50 SOLUTION OPHTHALMIC at 19:39

## 2018-03-10 RX ADMIN — MUPIROCIN SCH APPLIC: 20 OINTMENT TOPICAL at 08:18

## 2018-03-10 RX ADMIN — DEXTROSE MONOHYDRATE SCH MLS/HR: 5 INJECTION, SOLUTION INTRAVENOUS at 19:41

## 2018-03-10 RX ADMIN — NYSTATIN SCH UNIT: 100000 SUSPENSION ORAL at 14:01

## 2018-03-10 RX ADMIN — INSULIN ASPART SCH UNIT: 100 INJECTION, SOLUTION INTRAVENOUS; SUBCUTANEOUS at 22:07

## 2018-03-10 RX ADMIN — PANTOPRAZOLE SODIUM SCH: 40 TABLET, DELAYED RELEASE ORAL at 06:32

## 2018-03-10 RX ADMIN — HEPARIN SODIUM SCH UNIT: 5000 INJECTION, SOLUTION INTRAVENOUS; SUBCUTANEOUS at 08:16

## 2018-03-10 RX ADMIN — PANTOPRAZOLE SODIUM SCH MG: 40 TABLET, DELAYED RELEASE ORAL at 17:28

## 2018-03-10 RX ADMIN — DEXTROSE MONOHYDRATE SCH MLS/HR: 5 INJECTION, SOLUTION INTRAVENOUS at 03:24

## 2018-03-10 RX ADMIN — Medication SCH UNIT: at 08:15

## 2018-03-10 RX ADMIN — HEPARIN SODIUM SCH UNIT: 5000 INJECTION, SOLUTION INTRAVENOUS; SUBCUTANEOUS at 19:39

## 2018-03-10 RX ADMIN — HALOPERIDOL LACTATE PRN MG: 5 INJECTION, SOLUTION INTRAMUSCULAR at 23:57

## 2018-03-10 RX ADMIN — IPRATROPIUM BROMIDE AND ALBUTEROL SULFATE SCH ML: .5; 3 SOLUTION RESPIRATORY (INHALATION) at 10:56

## 2018-03-10 RX ADMIN — FAMOTIDINE SCH MG: 10 INJECTION, SOLUTION INTRAVENOUS at 08:16

## 2018-03-10 RX ADMIN — HALOPERIDOL LACTATE PRN MG: 5 INJECTION, SOLUTION INTRAMUSCULAR at 14:29

## 2018-03-10 RX ADMIN — HALOPERIDOL LACTATE PRN MG: 5 INJECTION, SOLUTION INTRAMUSCULAR at 19:27

## 2018-03-10 RX ADMIN — MUPIROCIN SCH APPLIC: 20 OINTMENT TOPICAL at 19:41

## 2018-03-10 RX ADMIN — IPRATROPIUM BROMIDE AND ALBUTEROL SULFATE SCH ML: .5; 3 SOLUTION RESPIRATORY (INHALATION) at 15:09

## 2018-03-10 RX ADMIN — NYSTATIN SCH UNIT: 100000 SUSPENSION ORAL at 08:17

## 2018-03-10 RX ADMIN — IPRATROPIUM BROMIDE AND ALBUTEROL SULFATE SCH ML: .5; 3 SOLUTION RESPIRATORY (INHALATION) at 07:20

## 2018-03-10 RX ADMIN — MUPIROCIN SCH APPLIC: 20 OINTMENT TOPICAL at 14:02

## 2018-03-10 RX ADMIN — NYSTATIN SCH UNIT: 100000 SUSPENSION ORAL at 17:28

## 2018-03-10 RX ADMIN — BUDESONIDE SCH MG: 1 SUSPENSION RESPIRATORY (INHALATION) at 07:21

## 2018-03-10 RX ADMIN — BUDESONIDE SCH MG: 1 SUSPENSION RESPIRATORY (INHALATION) at 19:25

## 2018-03-10 RX ADMIN — IPRATROPIUM BROMIDE AND ALBUTEROL SULFATE SCH ML: .5; 3 SOLUTION RESPIRATORY (INHALATION) at 19:25

## 2018-03-10 RX ADMIN — INSULIN ASPART SCH UNIT: 100 INJECTION, SOLUTION INTRAVENOUS; SUBCUTANEOUS at 08:17

## 2018-03-10 RX ADMIN — INSULIN ASPART SCH UNIT: 100 INJECTION, SOLUTION INTRAVENOUS; SUBCUTANEOUS at 17:28

## 2018-03-10 RX ADMIN — NYSTATIN SCH UNIT: 100000 SUSPENSION ORAL at 19:40

## 2018-03-10 RX ADMIN — Medication SCH MG: at 08:16

## 2018-03-10 RX ADMIN — HALOPERIDOL LACTATE PRN MG: 5 INJECTION, SOLUTION INTRAMUSCULAR at 05:51

## 2018-03-10 NOTE — PN
PROGRESS NOTE



DATE OF SERVICE:

March 10, 2018.



PRESENT COMPLAINT:

Tired and lethargic.



INTERVAL HISTORY:

Patient admitted with metabolic encephalopathy from urinary tract infection and sepsis

and blood cultures also growing E coli.  The patient remains to be lethargic but

arousable.  Still eating some.  No new issues.



REVIEW OF SYSTEMS:

Attempted for constitutional, cardiovascular, GI, pulmonary, relevant findings as

above.



CURRENT MEDICATIONS:

Reviewed include IV Zosyn and Levaquin.



PHYSICAL EXAMINATION:

Temperature 96.9, pulse 90, respiratory 18, blood pressure 125/73, pulse ox 98% on 4 L.

General appearance sitting up, lethargic but arousable.  Does answer questions.  Eyes

pupils equal. Conjunctivae normal.  HEENT external appearance of nose and ears normal.

Oral cavity normal.  Neck JVD unable to assess.  Mass not palpable.  Respiratory effort

increased.  Lungs decreased breath sounds.  Prolonged expiration.  Cardiovascular 1st

and 2nd sounds normal.  Lower extremity in dressings.

ABDOMEN:  Soft, nontender.  Liver and spleen not palpable.  Psychiatry:  Lethargic but

does wake up to answer questions.



INVESTIGATIONS:

White count 14.6, hemoglobin 8.8, potassium 3.6, BUN 40, creatinine 1.69.  Accu-Cheks

are noted.



ASSESSMENT:

1. Acute urinary tract infection with sepsis from E coli and blood cultures also

    positive for E coli present on admission.

2. Biopsy proven BOOP.  The patient has been on steroids.

3. Acute on chronic metabolic encephalopathy from infection, slow to respond.

4. Chronic hypoxic hypercapnic respiratory failure from underlying chronic obstructive

    pulmonary disease.

5. Chronic sigmoid diverticulosis.

6. Chronic obstructive pulmonary disease in an ex-smoker.

7. Chronic obesity.

8. Obesity hypoventilation syndrome.

9. Chronic cor pulmonale.

10.Diabetes mellitus type 2, chronically on insulin.

11.Chronic low back pain from spinal stenosis.

12.Chronic bilateral lower extremity lymphedema and chronic lower extremity

    cellulitis.

13.Chronic urinary stress incontinence.

14.Chronic restless leg syndrome.

15.History of splenectomy.

16.Recent gastrointestinal bleed blood for which Xarelto was discontinued.

17.Chronic duodenitis.

18.CODE STATUS DNR.



PLAN:

Continue current medication and treatment plan.  Currently no family members at the

bedside.  Prognosis remains not good.  If family decides for hospice, it will probably

be appropriate.  I did talk to the patient about the same.  She is agreeable. I do

think that is the best thing for her.





MMLAWSON / MADELEINE: [id number] / Job#: [id number]

## 2018-03-10 NOTE — P.PN
Subjective


Progress Note Date: 03/10/18











A 72-year-old female patient mom morbidly obese, chronically steroid dependent 

due to history of biopsy proven bronchiolitis obliterans with organizing 

pneumonia in addition to chronic hypoxic respiratory failure related to COPD 

was had multiple hospitalizations for various medical problems and 

comorbidities including previous hospitalization for pneumonia and septicemia.  

The patient was treated for a MRSA septicemia back in general 2018.  

Subsequently the patient was hospitalized in February for a pneumonia and 

respiratory distress.  Ultimately patient was treated and the patient [place] 

[place] [facility] [place].  She was doing well 

yet she was not at the point where she was ambulating.  She was bedridden.  

Yesterday, there has been rapid decline in the patient's mentation.  The 

patient was confused and she was not aware of her surroundings.  She has 

chronic urinary incontinence.  No reported dysuria Or urgency.  No cough or 

sputum production above and beyond her baseline.  No reported pleurisy.  No 

reported chest pain.  The patient came into the hospital and she was quite ill.

  The initial vital signs showed that the patient was febrile with a 

temperature 100.3.  The patient was tachycardic at 136.  Her initial blood 

pressure was 196/68.  She had leukocytosis with a white cell count of 27.2.  

The patient also had an acute kidney injury with a creatinine of 1.7.  Lactic 

acid level was at 4.7 and was also elevated.  She had lower extremities 

cellulitis bilaterally.  Barton catheter was inserted and the urine was quite 

cloudy and turbid and the urinalysis was consistent with possible urinary tract 

infection knowing that there was increased white cell count and bacteria.  Note 

that the patient has been involved with a gram-negative infections in the past 

including a previous history of Citrobacter septicemia back in January 2018.  

Patient was assessed his IV fluids.  She was given 2 L of IV fluids on the 

spot.  She was given antibiotics and she is currently covered with a 

combination of Levaquin and Zosyn and vancomycin.  At time of my evaluation, 

the patient was much more alert and awake.  She was communicating and she was 

able to recognize me without any major difficulties.  She was moving all 4 

extremities.  I inspected the lower extremities and there is obvious changes of 

cellulitis with increased warmth, redness and heat.  No open wounds or 

ulcerations.  The patient had a chest x-ray that showed no significant 

worsening of the x-ray findings compared to previous chest x-ray and the 

findings are essentially stable.  As such a superimposed pneumonia is felt to 

be less likely.  She is in the emergency department for now and the patient is 

awaiting her bed on the medical floor.





On 03/08/2018, the patient is being seen in the medical floor.  The patient was 

seen yesterday in the emergency department.  She was septic.  She was febrile.  

She was having altered mentation.  She is improved considerably.  She is going 

gram-negative bacillus in her urine and in her blood and as such the patient 

has a underlying urine checked infection with a gram-negative microorganism and 

secondary sepsis.  The patient also has a right-sided lower extremities.  The 

legs today are less swollen and less erythematous and less tender and less warm 

compared to yesterday.  As such there is significant improvement in her 

condition.  She remains on a combination of Zosyn, Levaquin and vancomycin.  

The patient is also asking to be kept on her steroids.  Note that she has been 

steroid dependent regarding her bronchiolitis obliterans with organizing 

pneumonia.  I put at 40 mg however she wants to cut it further and I think she 

should farewell while being on 20 mg of prednisone.  The adjustments will be 

made today and she'll be monitored very closely.  Otherwise there has been no 

significant events overnight.  She is quite debilitated she's been essentially 

non-ambulatory for quite some time.  Oxygen levels are above 90% at 40 to 

oxygen nasal cannula.





On 03/09/2080 I'm seeing this patient for a follow-up.  The patient was 

hospitalized for a sepsis secondary to a gram-negative urine checked infection 

that to turn out to be E. coli.  She was stable and she got moved to a medical 

floor where she was seen yesterday and she was doing very well.  This afternoon 

the patient was found to be confused and restless and lethargic.  She was felt 

to be going into fluid overload.  Based on that immediately the fluid was Down 

to KVO and the patient was given a dose of Lasix 40 mg IV push.  She is on 3 

liters of oxygen by nasal cannula.  She is still broad-spectrum antibiotics 

with a combination of Zosyn and Levaquin and the patient's white cell count is 

improving and is currently down to 20.  She is chronically steroid dependent 

and was placed the patient on a 20 mg of prednisone as maintenance.  Rest of 

the medication remains unchanged.  She was somewhat delirious and confused and 

was started patient also on Haldol 1 mg every 4 hours on a when necessary 

basis.  A follow-up chest x-ray was also obtained and it showed that the heart 

appeared enlarged and there is evidence of pulmonary artery hypertension and 

chronic interstitial lung disease.  No airspace disease.  No pneumonias.  No 

other new abnormalities noted.








On 03/10/2018, I'm seeing this patient for a follow-up.  The patient is still 

on of confused.  She is lethargic.  She is less short of breath compared to 

yesterday.  Urine culture and the blood culture was positive for E. coli.  The 

patient is in a combination of Zosyn and Levaquin.  White cell count is 

improving.  Urine output is stable.  No nausea.  No emesis.  She has taken some 

soft diet and she is able to swallow appropriately.  Neurologic exam is 

nonfocal.  Meanwhile, the patient also gave a sputum sample that do not to be 

positive for Acinetobacter and yet again this is sensitive for Zosyn..  The 

patient was tapered down to 20 mg of prednisone.  .  She is on bronchodilators 

around the clock.  She is on a telemetry unit and she is being monitored very 

closely.  Chest x-ray from yesterday showed a component of pulmonary vessel 

congestion and the patient responded nicely to diuretics and she was given 

Lasix.





Objective





- Vital Signs


Vital signs: 


 Vital Signs











Temp  97 F L  03/10/18 11:25


 


Pulse  102 H  03/10/18 15:17


 


Resp  20   03/10/18 11:34


 


BP  119/73   03/10/18 11:25


 


Pulse Ox  100   03/10/18 11:25








 Intake & Output











 03/09/18 03/10/18 03/10/18





 18:59 06:59 18:59


 


Intake Total 290 220 200


 


Output Total 125 1550 350


 


Balance 165 -1330 -150


 


Weight  123.5 kg 


 


Intake:   


 


   20 


 


    .9 @ 10 120 20 


 


  Intake, IV Titration 50 150 





  Amount   


 


    Calcium Gluconate 1,000  100 





    mg In Sodium Chloride 0.9   





    % 100 ml @ 100 mls/hr   





    IVPB ONCE ONE Rx#:   





    [id number]   


 


    Piperacillin-Tazobactam 3 50 50 





    .375 gm In Dextrose/Water   





    1 50ml.bag @ 12.5 mls/hr   





    IVPB Q8H [facility] Rx#:   





    [id number]   


 


  Oral 120 50 200


 


Output:   


 


  Urine 125 1550 350


 


    Uretheral (Barton) 125 650 350


 


Other:   


 


  Voiding Method Indwelling Catheter Indwelling Catheter Indwelling Catheter


 


  # Voids   1


 


  # Bowel Movements 2  














- Exam











GENERAL EXAM:  obese.  Alert, and she is somewhat delirious and confused..  The 

patient is awake and the patient was able to follow commands and answer 

questions appropriately.


HEAD: Normocephalic.


EYES: Normal reaction of pupils, equal size.


NOSE: Clear with pink turbinates.


THROAT: No erythema or exudates.  Mallampati class IV and significant crowding 

of the posterior oropharynx pain the patient also has some oropharyngeal 

candidiasis.


NECK: No masses, no JVD.


CHEST: No chest wall deformity.


LUNGS: Equal air entry with no crackles, wheeze, rhonchi or dullness.  The 

patient is prolongation of expiratory phase of breathing and diffuse expiratory 

wheezes heard throughout the lung fields bilaterally.


CVS: S1 and S2 normal with no audible murmur, regular rhythm.


ABDOMEN: No hepatosplenomegaly, normal bowel sounds, no guarding or rigidity.


SPINE: No scoliosis or deformity


SKIN: There is a cellulitis of the lower extremities bilaterally.  This allowed 

is improved considerably for now.


CENTRAL NERVOUS SYSTEM: No focal deficits, tone is normal in all 4 extremities.

  Patient is delirious and confused.  She isn't verbal.  She is morbidly 4 

extremities.


EXTREMITIES: There is changes of chronic peripheral edema.  No clubbing, no 

cyanosis.  Peripheral pulses are intact.  Patient has her labs of the lower 

extremities bilaterally extending just above the ankles to the feet 

bilaterally.  No open wounds.  No draining ulcers.  





- Labs


CBC & Chem 7: 


 03/10/18 06:22





 03/10/18 06:22


Labs: 


 Abnormal Lab Results - Last 24 Hours (Table)











  03/09/18 03/09/18 03/10/18 Range/Units





  16:39 21:21 05:54 


 


WBC     (3.8-10.6)  k/uL


 


RBC     (3.80-5.40)  m/uL


 


Hgb     (11.4-16.0)  gm/dL


 


Hct     (34.0-46.0)  %


 


MCHC     (31.0-37.0)  g/dL


 


RDW     (11.5-15.5)  %


 


Carbon Dioxide     (22-30)  mmol/L


 


BUN     (7-17)  mg/dL


 


Creatinine     (0.52-1.04)  mg/dL


 


Glucose     (74-99)  mg/dL


 


POC Glucose (mg/dL)  155 H  137 H  136 H  (75-99)  mg/dL


 


Calcium     (8.4-10.2)  mg/dL














  03/10/18 03/10/18 03/10/18 Range/Units





  06:22 06:22 11:29 


 


WBC   14.6 H   (3.8-10.6)  k/uL


 


RBC   3.15 L   (3.80-5.40)  m/uL


 


Hgb   8.8 L   (11.4-16.0)  gm/dL


 


Hct   29.0 L   (34.0-46.0)  %


 


MCHC   30.4 L   (31.0-37.0)  g/dL


 


RDW   18.7 H   (11.5-15.5)  %


 


Carbon Dioxide  32 H    (22-30)  mmol/L


 


BUN  40 H    (7-17)  mg/dL


 


Creatinine  1.69 H    (0.52-1.04)  mg/dL


 


Glucose  136 H    (74-99)  mg/dL


 


POC Glucose (mg/dL)    256 H  (75-99)  mg/dL


 


Calcium  7.3 L    (8.4-10.2)  mg/dL








 Microbiology - Last 24 Hours (Table)











 03/07/18 16:40 Gram Stain - Final





 Sputum Sputum Culture - Final





    Acinetobacter rod/haemol


 


 03/09/18 12:58 Blood Culture Gram Stain - Preliminary





 Blood 


 


 03/09/18 12:58 Blood Culture - Final





 Blood 














Assessment and Plan


Plan: 








Assessment





1 acute sepsis secondary to a urinary tract infection with E. coli.  The 

patient is also bacteremic and septic with E. coli.





2 cellulitis of the lower extremities bilaterally, improving





3 altered mentation secondary to septicemia.  The patient's mental status 

improved and subsequently she became more delirious.  She'll be started on 

Haldol.  Neurologic exam is nonfocal.  Compared to yesterday, the patient 

remains the same.  She is arousable however she remains confused.  Her 

mentation is waxing and waning.  Neurologic exam remains nonfocal.





4 morbid obesity





5 COPD 





6 chronic hypoxic respiratory failure and the patient has been oxygen and 

steroid-dependent





7 bronchiolitis obliterans with organizing pneumonia maintained on systemic 

steroids, and the patient is currently on a 20 mg of prednisone a daily basis





8 chronic steroid use with ongoing weight gain





9 microcirculatory pulmonary emboli and the patient was taken Xarelto in the 

past and this was discontinued.





10 restless leg syndrome





11 chronic lower extremity edema along with episodes of cellulitis





12 chronic urinary incontinence





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





14 chronic diverticulosis





15 oropharyngeal thrush





16 preserved LV function with ejection fraction of 55% and mild-to-moderate 

pulmonary hypertension based on the most recent echocardiogram





17 previous history of GI bleeding





18 patient was at nursing home with very poor baseline performance and 

functional status secondary above-mentioned comorbidities





19 acute leukocytosis secondary to sepsis, improving





Plan





Continue Zosyn, Levaquin.  Continue oral prednisone.  Continue bronchodilators.

  Monitor mentation.  The patient is encephalopathic probably related to her 

underlying sepsis.  No agitation.  White cell count is improving.  He was 

ultimately stable.  Long-term prognosis poor based on the above-mentioned 

comorbidities.  There is a constellation for comfort care measures as mentioned 

today by family members.  We'll continue to follow.

## 2018-03-10 NOTE — PN
PROGRESS NOTE



DATE OF SERVICE:

3/9/18.



PRESENTING COMPLAINT:

Tired.



INTERVAL HISTORY:

The patient is admitted with metabolic encephalopathy stemming from UTI and sepsis both

growing E coli.  The patient is still encephalopathic, but does get up to answer some

questions. Eating small amount.  Patient's  and other family members at the

bedside when I saw this patient this afternoon.



REVIEW OF SYSTEMS:

Done for constitutional, cardiovascular, GI, pulmonary; relevant findings as above.



CURRENT MEDICATIONS:

Reviewed that include IV Levaquin and IV Zosyn. The patient is also on prednisone 20

mg.



PHYSICAL EXAMINATION:

Temperature 96.6, pulse 102, respiration 26, blood pressure 130/70, pulse ox 97% on 3

L.

GENERAL APPEARANCE: Lethargic but arousable, does answer questions.

EYES: Pupils equal. Conjunctivae normal.

HEENT: External appearance of nose and ears normal. Oral cavity normal.

NECK: JVD unable to assess.  Mass not palpable.

RESPIRATORY: Effort increased.  Lungs decreased breath sounds.  Prolonged expiration.

CARDIOVASCULAR: First and second sounds normal. Both lower extremities in dressing.

ABDOMEN:  Soft, nontender.  Liver and spleen not palpable.

PSYCHIATRY: Patient lethargic but arousable. Does answer questions.

DERMATOLOGICAL: Chronic cellulitis both lower extremity, currently in dressing.



INVESTIGATIONS:

White count 20.8, hemoglobin 9, platelets 120, potassium 4.7, BUN 43, creatinine 1.77,

calcium 6.5, repeat ionized calcium is 3.8.



ASSESSMENT:

1. Acute urinary tract infection with sepsis from E coli and blood cultures also

    positive for E coli.

2. Biopsy-proven bronchiolitis obliterans organizing pneumonia for which patient has

    steroids.

3. Acute on chronic metabolic encephalopathy from infection.

4. Chronic hypoxic and hypercapnic respiratory failure from underlying chronic

    obstructive pulmonary disease.

5. Chronic sigmoid diverticulosis.

6. Chronic obstructive pulmonary disease in an ex-smoker.

7. Obesity hypoventilation syndrome.

8. Chronic cor pulmonale.

9. Diabetes mellitus type 2, chronically on insulin.

10.Chronic low back pain from spinal stenosis.

11.Chronic bilateral lower extremity lymphedema and chronic lower extremity

    cellulitis.

12.Chronic urinary stress incontinence.

13.Chronic restless leg syndrome.

14.History of splenectomy.

15.Duodenal ulcer with recent GI bleed for which patient is on Xarelto now which has

    been taken off.

16.Chronic duodenitis.



PLAN:

Care was discussed with the patient and several family members at the bedside including

the .  Prognosis not good.  Antibiotics are to continue. Depending how the

patient does in the next 24-48 hours it may not be unreasonable to lean towards hospice

if the patient's overall quality of life is not good and the patient has had multiple

readmissions and has continued to decline.





MIGNONL / JEIMYN: [id number] / Job#: [id number]

## 2018-03-11 LAB
ANION GAP SERPL CALC-SCNC: 7 MMOL/L
BUN SERPL-SCNC: 35 MG/DL (ref 7–17)
CALCIUM SPEC-MCNC: 7.7 MG/DL (ref 8.4–10.2)
CHLORIDE SERPL-SCNC: 103 MMOL/L (ref 98–107)
CO2 SERPL-SCNC: 34 MMOL/L (ref 22–30)
ERYTHROCYTE [DISTWIDTH] IN BLOOD BY AUTOMATED COUNT: 3.12 M/UL (ref 3.8–5.4)
ERYTHROCYTE [DISTWIDTH] IN BLOOD: 18.4 % (ref 11.5–15.5)
GLUCOSE BLD-MCNC: 117 MG/DL (ref 75–99)
GLUCOSE BLD-MCNC: 145 MG/DL (ref 75–99)
GLUCOSE BLD-MCNC: 183 MG/DL (ref 75–99)
GLUCOSE BLD-MCNC: 234 MG/DL (ref 75–99)
GLUCOSE SERPL-MCNC: 100 MG/DL (ref 74–99)
HCT VFR BLD AUTO: 28.9 % (ref 34–46)
HGB BLD-MCNC: 9.1 GM/DL (ref 11.4–16)
MCH RBC QN AUTO: 29.1 PG (ref 25–35)
MCHC RBC AUTO-ENTMCNC: 31.5 G/DL (ref 31–37)
MCV RBC AUTO: 92.5 FL (ref 80–100)
PLATELET # BLD AUTO: 181 K/UL (ref 150–450)
POTASSIUM SERPL-SCNC: 4.1 MMOL/L (ref 3.5–5.1)
SODIUM SERPL-SCNC: 144 MMOL/L (ref 137–145)
WBC # BLD AUTO: 13.9 K/UL (ref 3.8–10.6)

## 2018-03-11 RX ADMIN — PANTOPRAZOLE SODIUM SCH MG: 40 TABLET, DELAYED RELEASE ORAL at 05:55

## 2018-03-11 RX ADMIN — INSULIN ASPART SCH UNIT: 100 INJECTION, SOLUTION INTRAVENOUS; SUBCUTANEOUS at 09:30

## 2018-03-11 RX ADMIN — DEXTROSE MONOHYDRATE SCH MLS/HR: 5 INJECTION, SOLUTION INTRAVENOUS at 05:26

## 2018-03-11 RX ADMIN — DEXTROSE MONOHYDRATE SCH MLS/HR: 5 INJECTION, SOLUTION INTRAVENOUS at 15:07

## 2018-03-11 RX ADMIN — INSULIN ASPART SCH UNIT: 100 INJECTION, SOLUTION INTRAVENOUS; SUBCUTANEOUS at 22:48

## 2018-03-11 RX ADMIN — INSULIN ASPART SCH: 100 INJECTION, SOLUTION INTRAVENOUS; SUBCUTANEOUS at 12:30

## 2018-03-11 RX ADMIN — INSULIN ASPART SCH: 100 INJECTION, SOLUTION INTRAVENOUS; SUBCUTANEOUS at 12:29

## 2018-03-11 RX ADMIN — IPRATROPIUM BROMIDE AND ALBUTEROL SULFATE SCH: .5; 3 SOLUTION RESPIRATORY (INHALATION) at 15:34

## 2018-03-11 RX ADMIN — NYSTATIN SCH UNIT: 100000 SUSPENSION ORAL at 09:32

## 2018-03-11 RX ADMIN — INSULIN ASPART SCH: 100 INJECTION, SOLUTION INTRAVENOUS; SUBCUTANEOUS at 05:55

## 2018-03-11 RX ADMIN — POTASSIUM CHLORIDE SCH MEQ: 750 TABLET, EXTENDED RELEASE ORAL at 09:31

## 2018-03-11 RX ADMIN — PANTOPRAZOLE SODIUM SCH: 40 TABLET, DELAYED RELEASE ORAL at 16:55

## 2018-03-11 RX ADMIN — HALOPERIDOL LACTATE PRN MG: 5 INJECTION, SOLUTION INTRAMUSCULAR at 06:36

## 2018-03-11 RX ADMIN — Medication SCH UNIT: at 09:32

## 2018-03-11 RX ADMIN — IPRATROPIUM BROMIDE AND ALBUTEROL SULFATE SCH: .5; 3 SOLUTION RESPIRATORY (INHALATION) at 21:06

## 2018-03-11 RX ADMIN — FAMOTIDINE SCH MG: 10 INJECTION, SOLUTION INTRAVENOUS at 09:31

## 2018-03-11 RX ADMIN — HALOPERIDOL LACTATE PRN MG: 5 INJECTION, SOLUTION INTRAMUSCULAR at 15:16

## 2018-03-11 RX ADMIN — Medication SCH MG: at 09:31

## 2018-03-11 RX ADMIN — MUPIROCIN SCH APPLIC: 20 OINTMENT TOPICAL at 09:32

## 2018-03-11 RX ADMIN — NYSTATIN SCH: 100000 SUSPENSION ORAL at 16:56

## 2018-03-11 RX ADMIN — IPRATROPIUM BROMIDE AND ALBUTEROL SULFATE SCH ML: .5; 3 SOLUTION RESPIRATORY (INHALATION) at 11:31

## 2018-03-11 RX ADMIN — NYSTATIN SCH: 100000 SUSPENSION ORAL at 14:58

## 2018-03-11 RX ADMIN — IPRATROPIUM BROMIDE AND ALBUTEROL SULFATE SCH ML: .5; 3 SOLUTION RESPIRATORY (INHALATION) at 07:43

## 2018-03-11 RX ADMIN — HALOPERIDOL LACTATE PRN MG: 5 INJECTION, SOLUTION INTRAMUSCULAR at 19:29

## 2018-03-11 RX ADMIN — INSULIN ASPART SCH: 100 INJECTION, SOLUTION INTRAVENOUS; SUBCUTANEOUS at 16:55

## 2018-03-11 RX ADMIN — HEPARIN SODIUM SCH UNIT: 5000 INJECTION, SOLUTION INTRAVENOUS; SUBCUTANEOUS at 09:32

## 2018-03-11 RX ADMIN — BUDESONIDE SCH MG: 1 SUSPENSION RESPIRATORY (INHALATION) at 07:43

## 2018-03-11 RX ADMIN — MUPIROCIN SCH APPLIC: 20 OINTMENT TOPICAL at 15:06

## 2018-03-11 NOTE — PN
PROGRESS NOTE



DATE OF SERVICE:

03/11/18.



PRESENTING COMPLAINT:

Tired, lethargic.



INTERVAL HISTORY:

This is a patient admitted with UTI sepsis.  Blood cultures positive E coli.  Continued

to go downhill becoming very lethargic, more lethargic barely eating now.  Family

especially  has come to terms of the fact that she is deteriorating rapidly.



REVIEW OF SYSTEMS:

Cannot be done as patient rather lethargic and barely arousable.



CURRENT MEDICATIONS:

Reviewed including IV antibiotics.



PHYSICAL EXAMINATION:

Temperature 97.1, pulse 103, respiratory rate 24, blood pressure 140/76, pulse ox 97%

on 4 L. General appearance:  Lying in bed, very lethargic, barely arousable.  Eyes

pupil's equal. Conjunctivae  normal.  HEENT external appearance of nose and ears

normal.  Oral cavity dry.  Neck:  JVD unable to assess.  Mass not palpable.

Respiratory effort increased.

LUNGS:  Diminished breath sounds.  Cardiovascular:  1st and 2nd sounds normal.  Lower

extremity dressing.

ABDOMEN:  Soft, nontender.  Liver and spleen not palpable.  Psychiatry:  Very

lethargic, barely arousable.



INVESTIGATIONS:

White count 13.9, hemoglobin 9.1, potassium 4.1, BUN 35, creatinine 1.70.



ASSESSMENT:

1. Acute urinary tract infection with sepsis from E coli.  Blood cultures also

    positive for E coli present on admission.

2. 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

3. Acute on chronic metabolic encephalopathy from infection, worsening.

4. Chronic hypoxic and hypercapnic respiratory failure from underlying chronic

    obstructive pulmonary disease.

5. Chronic sigmoid diverticulosis.

6. Chronic obstructive pulmonary disease in an ex-smoker.

7. Chronic obesity.

8. Obesity hypoventilation syndrome.

9. Chronic cor pulmonale.

10.Diabetes mellitus type 2, chronically on insulin.

11.Chronic low back pain from spinal stenosis.

12.Chronic bilateral lower extremity lymphedema and chronic lower extremity

    cellulitis.

13.Chronic urinary stress incontinence.

14.Chronic restless leg syndrome.

15.History of splenectomy.

16.Recent gastrointestinal bleed for which patient Xarelto was discontinued.

17.Chronic duodenitis.

18.CODE STATUS DNR.



PLAN:

Spoke to the  on the phone.  Hospice is being getting called in.  We will stop

all of medication except use Ativan for comfort.  Telemetry will be taken off.

Prognosis very poor.  Total time spent today was about 40 minutes with over 25 to 30

minutes of discussion including the nurse, family.





MMLEONCIOL / JEIMYN: [id number] / Job#: [id number]

## 2018-03-11 NOTE — P.PN
Subjective


Progress Note Date: 03/11/18











A 72-year-old female patient mom morbidly obese, chronically steroid dependent 

due to history of biopsy proven bronchiolitis obliterans with organizing 

pneumonia in addition to chronic hypoxic respiratory failure related to COPD 

was had multiple hospitalizations for various medical problems and 

comorbidities including previous hospitalization for pneumonia and septicemia.  

The patient was treated for a MRSA septicemia back in general 2018.  

Subsequently the patient was hospitalized in February for a pneumonia and 

respiratory distress.  Ultimately patient was treated and the [place] 

[place] [facility] [place].  She was doing well 

yet she was not at the point where she was ambulating.  She was bedridden.  

Yesterday, there has been rapid decline in the patient's mentation.  The 

patient was confused and she was not aware of her surroundings.  She has 

chronic urinary incontinence.  No reported dysuria Or urgency.  No cough or 

sputum production above and beyond her baseline.  No reported pleurisy.  No 

reported chest pain.  The patient came into the hospital and she was quite ill.

  The initial vital signs showed that the patient was febrile with a 

temperature 100.3.  The patient was tachycardic at 136.  Her initial blood 

pressure was 196/68.  She had leukocytosis with a white cell count of 27.2.  

The patient also had an acute kidney injury with a creatinine of 1.7.  Lactic 

acid level was at 4.7 and was also elevated.  She had lower extremities 

cellulitis bilaterally.  Barton catheter was inserted and the urine was quite 

cloudy and turbid and the urinalysis was consistent with possible urinary tract 

infection knowing that there was increased white cell count and bacteria.  Note 

that the patient has been involved with a gram-negative infections in the past 

including a previous history of Citrobacter septicemia back in January 2018.  

Patient was assessed his IV fluids.  She was given 2 L of IV fluids on the 

spot.  She was given antibiotics and she is currently covered with a 

combination of Levaquin and Zosyn and vancomycin.  At time of my evaluation, 

the patient was much more alert and awake.  She was communicating and she was 

able to recognize me without any major difficulties.  She was moving all 4 

extremities.  I inspected the lower extremities and there is obvious changes of 

cellulitis with increased warmth, redness and heat.  No open wounds or 

ulcerations.  The patient had a chest x-ray that showed no significant 

worsening of the x-ray findings compared to previous chest x-ray and the 

findings are essentially stable.  As such a superimposed pneumonia is felt to 

be less likely.  She is in the emergency department for now and the patient is 

awaiting her bed on the medical floor.





On 03/08/2018, the patient is being seen in the medical floor.  The patient was 

seen yesterday in the emergency department.  She was septic.  She was febrile.  

She was having altered mentation.  She is improved considerably.  She is going 

gram-negative bacillus in her urine and in her blood and as such the patient 

has a underlying urine checked infection with a gram-negative microorganism and 

secondary sepsis.  The patient also has a right-sided lower extremities.  The 

legs today are less swollen and less erythematous and less tender and less warm 

compared to yesterday.  As such there is significant improvement in her 

condition.  She remains on a combination of Zosyn, Levaquin and vancomycin.  

The patient is also asking to be kept on her steroids.  Note that she has been 

steroid dependent regarding her bronchiolitis obliterans with organizing 

pneumonia.  I put at 40 mg however she wants to cut it further and I think she 

should farewell while being on 20 mg of prednisone.  The adjustments will be 

made today and she'll be monitored very closely.  Otherwise there has been no 

significant events overnight.  She is quite debilitated she's been essentially 

non-ambulatory for quite some time.  Oxygen levels are above 90% at 40 to 

oxygen nasal cannula.





On 03/09/2080 I'm seeing this patient for a follow-up.  The patient was 

hospitalized for a sepsis secondary to a gram-negative urine checked infection 

that to turn out to be E. coli.  She was stable and she got moved to a medical 

floor where she was seen yesterday and she was doing very well.  This afternoon 

the patient was found to be confused and restless and lethargic.  She was felt 

to be going into fluid overload.  Based on that immediately the fluid was Down 

to KVO and the patient was given a dose of Lasix 40 mg IV push.  She is on 3 

liters of oxygen by nasal cannula.  She is still broad-spectrum antibiotics 

with a combination of Zosyn and Levaquin and the patient's white cell count is 

improving and is currently down to 20.  She is chronically steroid dependent 

and was placed the patient on a 20 mg of prednisone as maintenance.  Rest of 

the medication remains unchanged.  She was somewhat delirious and confused and 

was started patient also on Haldol 1 mg every 4 hours on a when necessary 

basis.  A follow-up chest x-ray was also obtained and it showed that the heart 

appeared enlarged and there is evidence of pulmonary artery hypertension and 

chronic interstitial lung disease.  No airspace disease.  No pneumonias.  No 

other new abnormalities noted.








On 03/10/2018, I'm seeing this patient for a follow-up.  The patient is still 

on of confused.  She is lethargic.  She is less short of breath compared to 

yesterday.  Urine culture and the blood culture was positive for E. coli.  The 

patient is in a combination of Zosyn and Levaquin.  White cell count is 

improving.  Urine output is stable.  No nausea.  No emesis.  She has taken some 

soft diet and she is able to swallow appropriately.  Neurologic exam is 

nonfocal.  Meanwhile, the patient also gave a sputum sample that do not to be 

positive for Acinetobacter and yet again this is sensitive for Zosyn..  The 

patient was tapered down to 20 mg of prednisone.  .  She is on bronchodilators 

around the clock.  She is on a telemetry unit and she is being monitored very 

closely.  Chest x-ray from yesterday showed a component of pulmonary vessel 

congestion and the patient responded nicely to diuretics and she was given 

Lasix.





On 03/11/2018 the patient is being seen for a follow-up.  She is in a very poor 

condition.  She is very lethargic, significant diminished level of 

consciousness and obtunded.  Note that her blood cultures still positive for 

gram-negative bacillus that was earlier confirmed to be E. coli.  She was 

treated with accommodation Zosyn and Levaquin.  Her condition somewhat improved 

initially however subsequently she became confused and lethargic and became 

progressively more sick to the point where the family met with Dr. mao and 

they decided to proceed with comfort measures/hospice care.  Going back in the 

records, the patient has been quite ill and debilitated and the performance of 

status of been gradually getting worse over the year and she has had multiple 

hospitalization for septic complications and other complications.  I think it's 

reasonable to consider hospice care this patient at this point in time.





Objective





- Vital Signs


Vital signs: 


 Vital Signs











Temp  97.1 F L  03/11/18 11:51


 


Pulse  103 H  03/11/18 15:11


 


Resp  24   03/11/18 15:11


 


BP  141/76   03/11/18 11:51


 


Pulse Ox  97   03/11/18 11:51








 Intake & Output











 03/10/18 03/11/18 03/11/18





 17:59 06:59 18:59


 


Intake Total   120


 


Output Total   700


 


Balance   -580


 


Weight   


 


Intake:   


 


  IV   


 


    .9 @ 10   


 


  Intake, IV Titration   





  Amount   


 


    Piperacillin-Tazobactam 3   





    .375 gm In Dextrose/Water   





    1 50ml.bag @ 12.5 mls/hr   





    IVPB Q8H Counts include 234 beds at the [facility] Rx#:   





    [id number]   


 


  Oral   120


 


Output:   


 


  Urine   700


 


    Uretheral (Barton)   250


 


Other:   


 


  Voiding Method   Indwelling Catheter


 


  # Voids   


 


  # Bowel Movements   














- Exam











GENERAL EXAM:  obese.  Alert, and she is somewhat delirious and confused, and 

obtunded


HEAD: Normocephalic.


EYES: Normal reaction of pupils, equal size.


NOSE: Clear with pink turbinates.


THROAT: No erythema or exudates.  Mallampati class IV and significant crowding 

of the posterior oropharynx pain the patient also has some oropharyngeal 

candidiasis.


NECK: No masses, no JVD.


CHEST: No chest wall deformity.


LUNGS: Equal air entry with no crackles, wheeze, rhonchi or dullness.  The 

patient is prolongation of expiratory phase of breathing and diffuse expiratory 

wheezes heard throughout the lung fields bilaterally.


CVS: S1 and S2 normal with no audible murmur, regular rhythm.


ABDOMEN: No hepatosplenomegaly, normal bowel sounds, no guarding or rigidity.


SPINE: No scoliosis or deformity


SKIN: There is a cellulitis of the lower extremities bilaterally.  This allowed 

is improved considerably for now.


CENTRAL NERVOUS SYSTEM: Altered level of consciousness, very much obtunded and 

lethargic


EXTREMITIES: There is changes of chronic peripheral edema.  No clubbing, no 

cyanosis.  Peripheral pulses are intact.  Patient has her labs of the lower 

extremities bilaterally extending just above the ankles to the feet 

bilaterally.  No open wounds.  No draining ulcers.  





- Labs


CBC & Chem 7: 


 03/11/18 05:45





 03/11/18 05:45


Labs: 


 Abnormal Lab Results - Last 24 Hours (Table)











  03/10/18 03/10/18 03/11/18 Range/Units





  16:29 20:58 05:45 


 


WBC     (3.8-10.6)  k/uL


 


RBC     (3.80-5.40)  m/uL


 


Hgb     (11.4-16.0)  gm/dL


 


Hct     (34.0-46.0)  %


 


RDW     (11.5-15.5)  %


 


Carbon Dioxide    34 H  (22-30)  mmol/L


 


BUN    35 H  (7-17)  mg/dL


 


Creatinine    1.70 H  (0.52-1.04)  mg/dL


 


Glucose    100 H  (74-99)  mg/dL


 


POC Glucose (mg/dL)  184 H  146 H   (75-99)  mg/dL


 


Calcium    7.7 L  (8.4-10.2)  mg/dL














  03/11/18 03/11/18 03/11/18 Range/Units





  05:45 05:45 12:24 


 


WBC   13.9 H   (3.8-10.6)  k/uL


 


RBC   3.12 L   (3.80-5.40)  m/uL


 


Hgb   9.1 L   (11.4-16.0)  gm/dL


 


Hct   28.9 L   (34.0-46.0)  %


 


RDW   18.4 H   (11.5-15.5)  %


 


Carbon Dioxide     (22-30)  mmol/L


 


BUN     (7-17)  mg/dL


 


Creatinine     (0.52-1.04)  mg/dL


 


Glucose     (74-99)  mg/dL


 


POC Glucose (mg/dL)  117 H   145 H  (75-99)  mg/dL


 


Calcium     (8.4-10.2)  mg/dL








 Microbiology - Last 24 Hours (Table)











 03/09/18 12:58 Blood Culture Gram Stain - Preliminary





 Blood Blood Culture - Preliminary





    Gram Neg Bacilli


 


 03/07/18 16:40 Gram Stain - Final





 Sputum Sputum Culture - Final





    Acinetobacter rod/haemol














Assessment and Plan


Plan: 








Assessment





1 sepsis secondary to an E. coli urine checked infection 





2 cellulitis of the lower extremities bilaterally, improving





3 altered mentation secondary to septicemia.  





4 morbid obesity





5 COPD 





6 chronic hypoxic respiratory failure and the patient has been oxygen and 

steroid-dependent





7 bronchiolitis obliterans with organizing pneumonia maintained on systemic 

steroids





8 chronic steroid use with ongoing weight gain





9 microcirculatory pulmonary emboli and the patient was taken Xarelto in the 

past and this was discontinued.





10 restless leg syndrome





11 chronic lower extremity edema along with episodes of cellulitis





12 chronic urinary incontinence





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





14 chronic diverticulosis





15 oropharyngeal thrush





16 preserved LV function with ejection fraction of 55% and mild-to-moderate 

pulmonary hypertension based on the most recent echocardiogram





17 previous history of GI bleeding





18 patient was at nursing home with very poor baseline performance and 

functional status secondary above-mentioned comorbidities





19 acute leukocytosis secondary to sepsis, improving





Plan





Family is meeting with hospice today for further hospice care.  Pulmonary and 

critical care services we'll sign off.

## 2018-03-12 ENCOUNTER — HOSPITAL ENCOUNTER (INPATIENT)
Dept: HOSPITAL 47 - 5ONC | Age: 73
LOS: 1 days | DRG: 951 | End: 2018-03-13
Payer: COMMERCIAL

## 2018-03-12 VITALS — RESPIRATION RATE: 28 BRPM | SYSTOLIC BLOOD PRESSURE: 122 MMHG | DIASTOLIC BLOOD PRESSURE: 44 MMHG | TEMPERATURE: 99.5 F

## 2018-03-12 VITALS — HEART RATE: 96 BPM

## 2018-03-12 DIAGNOSIS — Z87.11: ICD-10-CM

## 2018-03-12 DIAGNOSIS — I27.20: ICD-10-CM

## 2018-03-12 DIAGNOSIS — E66.2: ICD-10-CM

## 2018-03-12 DIAGNOSIS — G89.29: ICD-10-CM

## 2018-03-12 DIAGNOSIS — Z79.899: ICD-10-CM

## 2018-03-12 DIAGNOSIS — R26.9: ICD-10-CM

## 2018-03-12 DIAGNOSIS — L03.116: ICD-10-CM

## 2018-03-12 DIAGNOSIS — A41.59: ICD-10-CM

## 2018-03-12 DIAGNOSIS — Z99.81: ICD-10-CM

## 2018-03-12 DIAGNOSIS — I10: ICD-10-CM

## 2018-03-12 DIAGNOSIS — Z87.01: ICD-10-CM

## 2018-03-12 DIAGNOSIS — Z90.49: ICD-10-CM

## 2018-03-12 DIAGNOSIS — E78.5: ICD-10-CM

## 2018-03-12 DIAGNOSIS — G25.81: ICD-10-CM

## 2018-03-12 DIAGNOSIS — R32: ICD-10-CM

## 2018-03-12 DIAGNOSIS — Z86.14: ICD-10-CM

## 2018-03-12 DIAGNOSIS — Z79.52: ICD-10-CM

## 2018-03-12 DIAGNOSIS — Z90.710: ICD-10-CM

## 2018-03-12 DIAGNOSIS — Z87.891: ICD-10-CM

## 2018-03-12 DIAGNOSIS — Z51.5: Primary | ICD-10-CM

## 2018-03-12 DIAGNOSIS — M54.5: ICD-10-CM

## 2018-03-12 DIAGNOSIS — E55.9: ICD-10-CM

## 2018-03-12 DIAGNOSIS — Z79.51: ICD-10-CM

## 2018-03-12 DIAGNOSIS — N17.9: ICD-10-CM

## 2018-03-12 DIAGNOSIS — M48.00: ICD-10-CM

## 2018-03-12 DIAGNOSIS — N39.0: ICD-10-CM

## 2018-03-12 DIAGNOSIS — J96.11: ICD-10-CM

## 2018-03-12 DIAGNOSIS — J44.9: ICD-10-CM

## 2018-03-12 DIAGNOSIS — Z66: ICD-10-CM

## 2018-03-12 DIAGNOSIS — I27.81: ICD-10-CM

## 2018-03-12 DIAGNOSIS — Z91.81: ICD-10-CM

## 2018-03-12 DIAGNOSIS — J84.89: ICD-10-CM

## 2018-03-12 DIAGNOSIS — K21.9: ICD-10-CM

## 2018-03-12 DIAGNOSIS — Z79.4: ICD-10-CM

## 2018-03-12 DIAGNOSIS — E11.9: ICD-10-CM

## 2018-03-12 DIAGNOSIS — Z86.711: ICD-10-CM

## 2018-03-12 DIAGNOSIS — L03.115: ICD-10-CM

## 2018-03-12 DIAGNOSIS — Z85.42: ICD-10-CM

## 2018-03-12 DIAGNOSIS — G93.41: ICD-10-CM

## 2018-03-12 LAB
GLUCOSE BLD-MCNC: 78 MG/DL (ref 75–99)
GLUCOSE BLD-MCNC: 79 MG/DL (ref 75–99)

## 2018-03-12 RX ADMIN — INSULIN ASPART SCH: 100 INJECTION, SOLUTION INTRAVENOUS; SUBCUTANEOUS at 07:09

## 2018-03-12 RX ADMIN — INSULIN ASPART SCH: 100 INJECTION, SOLUTION INTRAVENOUS; SUBCUTANEOUS at 12:12

## 2018-03-12 RX ADMIN — IPRATROPIUM BROMIDE AND ALBUTEROL SULFATE SCH ML: .5; 3 SOLUTION RESPIRATORY (INHALATION) at 11:06

## 2018-03-12 RX ADMIN — IPRATROPIUM BROMIDE AND ALBUTEROL SULFATE SCH ML: .5; 3 SOLUTION RESPIRATORY (INHALATION) at 07:24

## 2018-03-12 NOTE — DS
DISCHARGE SUMMARY



DATE OF ADMISSION:

03/07/2018.



DATE OF DISCHARGE:

03/12/2018.



FINAL DIAGNOSES:

1. Acute urinary tract infection with sepsis from E coli including blood cultures

    being positive for same, present on admission.

2. 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

    patient was on steroids.

3. Acute on chronic metabolic encephalopathy from infection, worsening.

4. Chronic hypoxic and hypercapnic respiratory failure from underlying chronic

    obstructive pulmonary disease.

5. Chronic sigmoid diverticulosis.

6. Chronic obstructive pulmonary disease in an ex-smoker.

7. Chronic obesity.

8. Obesity hypoventilation syndrome.

9. Chronic cor pulmonale.

10.Diabetes mellitus type 2, chronically on insulin.

11.Chronic low back pain from spinal stenosis.

12.Chronic bilateral lower extremity lymphedema and chronic lower extremity

    cellulitis.

13.Chronic urinary stress incontinence.

14.Chronic restless leg syndrome.

15.History of splenectomy.

16.Recent GI bleed for which patient's Xarelto was discontinued.

17.Chronic duodenitis.

18.CODE STATUS DNR.



CONSULTATION:

Dr. Enrique from Pulmonary, Dr. Roach from Infectious Disease.



HOSPITAL COURSE:

This patient presented yet again with an infection, this time UTI and sepsis.

Continued to deteriorate.  Family meeting was held.  decided for the patient to

appropriately go under hospice/respite care. The patient is being transferred under

hospice care, respite care now.

Reason for respite/hospice care is COPD, advanced, with other comorbidities.



DISPOSITION:

Hospice and respite Care under [facility].



PHYSICAL EXAMINATION:

On examination, the patient is somewhat delirious, tired, confused. Lungs decreased

breath sounds and wheezing.





MMODL / IJN: [id number] / Job#: [id number]

## 2018-03-13 NOTE — DS
DISCHARGE SUMMARY



DATE OF ADMISSION:

2018.



DATE PATIENT :

3/13/2018.



HOSPITAL COURSE:

This patient was admitted to respite care under hospice service.  The patient succumbed

to the underlying condition.



FINAL DIAGNOSES:

Advanced chronic obstructive pulmonary disease in an ex-smoker with multiple

comorbidities including BOOP, obesity, diabetes mellitus type 2.



The patient's  at the bedside.



Copy to Dr. Hernández.





MMLAWSON / MADELEINE: [id number] / Job#: [id number]

## 2018-04-01 NOTE — PN
PROGRESS NOTE



ADDENDUM:



DATE OF SERVICE:

02/15/18



PHYSICAL EXAMINATION:

Lungs slightly decreased breath sounds.  Cardiovascular 1st and second sounds normal.

Psych:  Awake, answering questions.





MMODL / IJN: [id number] / Job#: [id number]

## 2018-04-01 NOTE — PN
PROGRESS NOTE



DATE OF SERVICE:

02/13/2018.



PRESENTING COMPLAINT:

Short of breath.



INTERVAL HISTORY:

This patient with multiple medical problems, admitted with pneumonia, sepsis, also had

COPD exacerbation.  Early this morning patient was somewhat delirious, make sounds,

tachypneic, breathing through her mouth.  No focal weakness.



REVIEW OF SYSTEMS:

Difficult to assess, patient rather delirious.



CURRENT MEDICATIONS:

Reviewed in the electronic records for this day.



PHYSICAL EXAMINATION:

On examination, temperature 98.7, pulse 120, respiratory 22, blood pressure 130/73,

pulse ox 89% on 4 L.

GENERAL APPEARANCE:  Lying in bed, somewhat delirious.  Open-mouth breathing.

EYES: Pupils equal. Conjunctivae normal.

HEENT: External nose and ear normal. Oral cavity dry.

NECK: JVD unable to assess.  Mass not palpable.  Respiratory effort increased.

LUNGS: Decreased breath sounds.  Prolonged expiration.

CARDIOVASCULAR: 1st and 2nd sounds normal. Minimal edema.

ABDOMEN:  Soft, nontender.  Liver and spleen not palpable.

PSYCHIATRY: The patient is delirious, does seem to answer some questions slightly.

NEUROLOGIC: Moving all 4 limbs.



INVESTIGATIONS:

Blood gas showed a pH of 7.51, pCO2 of 54, PO2 45, oxygen saturation 81.4% on 4 liters

of oxygen. White count 14.5, hemoglobin 8.1, potassium 4.3, bicarb 41, BUN 42,

creatinine 1.43. Accu-Cheks noted.



ASSESSMENT:

1. Acute bilateral pneumonia causing sepsis, present on admission.

2. Chronic sigmoid diverticulosis.

3. Acute hypoxic and hypercapnic respiratory failure from underlying pneumonia,

    sepsis, chronic obstructive pulmonary disease.

4. Chronic hypoxic respiratory failure on 3 L oxygen at home from underlying chronic

    obstructive pulmonary disease.

5. Acute chronic obstructive pulmonary disease exacerbation in an ex-smoker.

6. Obesity hypoventilation syndrome.

7. Cor pulmonale from chronic obstructive pulmonary disease.

8. Diabetes mellitus type 2, chronically on insulin.

9. Chronic low back pain.  Spinal stenosis.

10.Chronic bilateral lower extremity lymphedema.

11.Chronic urinary stress incontinence.

12.Gait dysfunction uses a walker.

13.Chronic lower extremity cellulitis.

14.History of splenectomy.

15.Chronic venous insufficiency of lower extremities.



PLAN:

Continue with antibiotics.  I spoke to the nurse and the respiratory therapist, put the

patient on BiPAP and also I asked them to contact Dr. Chino. Later in the day the

patient was doing a bit better.  The patient had no other focal neuro signs.  Diamox

was added.  Prognosis is guarded.





MMODL / IJN: [id number] / Job#: [id number]

## 2019-01-28 NOTE — P.PN
Subjective


Progress Note Date: 02/13/18


Principal diagnosis: 





Shortness of breath





Progress note dated 02/13/2018





72-year-old female well-known to me.  She has significant COPD as well as biopsy

-proven bronchiolitis obliterans organizing pneumonia as well as 

microcirculatory pulmonary thrombi.  The patient's on prednisone 10 mg a day 

and also on a factor X a inhibitor.  She's also on all her usual COPD 

medications.  She has multiple admissions to the hospital recently.  She 

usually gets discharged here to [facility].  Occasionally her saturations will be 

in the mid 80s in the nurses that [facility] in the [facility] panicked 

call the EMS and they bring her back to the hospital.  She's pretty much at 

baseline in my opinion.  She could be discharged home in the next 24-48 hours.  

The patient does have desaturation when she moves about.  Her lower extremity 

edema is at baseline.  She's got some chronic venous stasis changes and 

hyperpigmentation to the lower extremities.  Other than that though, she is 

pretty much at baseline.  She was in the ICU for about a day and a half.  She 

was seen by cardiology.  The patient had a very mild elevation of her 

troponins.  Likely related to supply demand mismatch.  I don't believe she had 

a myocardial infarction.  Cardiology feel similarly.  Other than that, she is 

doing reasonably well.





Objective





- Vital Signs


Vital signs: 


 Vital Signs











Temp  97.1 F L  02/13/18 07:20


 


Pulse  92   02/13/18 07:22


 


Resp  18   02/13/18 07:20


 


BP  127/65   02/13/18 07:20


 


Pulse Ox  92 L  02/13/18 07:30








 Intake & Output











 02/12/18 02/13/18 02/13/18





 18:59 06:59 18:59


 


Intake Total 680  


 


Balance 680  


 


Intake:   


 


    


 


    Meropenem 1 gm In Sodium 200  





    Chloride 0.9% 100 ml @   





    200 mls/hr IVPB Q8HR [facility]   





    Rx#:[id number]   


 


  Oral 480  


 


Other:   


 


  Voiding Method Bedpan Bedpan Bedpan


 


  # Voids 1 6 


 


  # Bowel Movements 1 2 














- Exam





No acute distress, oriented 3.  Mild shortness of breath.  Nasal O2 in place.





HEENT examination is grossly unremarkable.  Mucous membranes are moist.  No 

oral lesions.





Neck supple.  Full range of motion.  No adenopathy thyromegaly or neck vein 

distention.





Cardiovascular examination reveals regular rhythm rate.  S1-S2 normal.  No S3 

or S4.  No discernible murmur noted.





Lungs reveal a few scattered rhonchi.  No wheezes or crackles.  Breath sounds 

are improved.  Breath sounds are pretty much at baseline.  Breath sounds are 

diminished throughout.  Slight prolongation on forced maneuver.  





Abdomen soft bowel sounds are heard.  No masses or tenderness.  Abdomen is 

obese.





Extremities are intact.  There are chronic venous stasis changes and 

hyperpigmentation to the lower extremities.  There is some slight edema but 

overall the edema is improved.





Skin is without rash or lesion.





Neurologic examination is brief but nonfocal.





- Labs


CBC & Chem 7: 


 02/13/18 09:14





 02/13/18 09:14


Labs: 


 Abnormal Lab Results - Last 24 Hours (Table)











  02/11/18 02/12/18 02/12/18 Range/Units





  08:00 12:54 17:55 


 


WBC     (3.8-10.6)  k/uL


 


RBC     (3.80-5.40)  m/uL


 


Hgb     (11.4-16.0)  gm/dL


 


Hct     (34.0-46.0)  %


 


MCHC     (31.0-37.0)  g/dL


 


RDW     (11.5-15.5)  %


 


Carbon Dioxide     (22-30)  mmol/L


 


BUN     (7-17)  mg/dL


 


Creatinine     (0.52-1.04)  mg/dL


 


Glucose     (74-99)  mg/dL


 


POC Glucose (mg/dL)   307 H  252 H  (75-99)  mg/dL


 


Hemoglobin A1c  7.3 H    (4.0-6.0)  %














  02/12/18 02/13/18 02/13/18 Range/Units





  21:52 07:44 09:14 


 


WBC    13.9 H  (3.8-10.6)  k/uL


 


RBC    2.94 L  (3.80-5.40)  m/uL


 


Hgb    8.2 L  (11.4-16.0)  gm/dL


 


Hct    28.8 L  (34.0-46.0)  %


 


MCHC    28.6 L  (31.0-37.0)  g/dL


 


RDW    20.1 H  (11.5-15.5)  %


 


Carbon Dioxide     (22-30)  mmol/L


 


BUN     (7-17)  mg/dL


 


Creatinine     (0.52-1.04)  mg/dL


 


Glucose     (74-99)  mg/dL


 


POC Glucose (mg/dL)  184 H  148 H   (75-99)  mg/dL


 


Hemoglobin A1c     (4.0-6.0)  %














  02/13/18 Range/Units





  09:14 


 


WBC   (3.8-10.6)  k/uL


 


RBC   (3.80-5.40)  m/uL


 


Hgb   (11.4-16.0)  gm/dL


 


Hct   (34.0-46.0)  %


 


MCHC   (31.0-37.0)  g/dL


 


RDW   (11.5-15.5)  %


 


Carbon Dioxide  34 H  (22-30)  mmol/L


 


BUN  38 H  (7-17)  mg/dL


 


Creatinine  1.18 H  (0.52-1.04)  mg/dL


 


Glucose  165 H  (74-99)  mg/dL


 


POC Glucose (mg/dL)   (75-99)  mg/dL


 


Hemoglobin A1c   (4.0-6.0)  %








 Microbiology - Last 24 Hours (Table)











 02/12/18 07:10 Gram Stain - Preliminary





 Sputum Sputum Culture - Preliminary


 


 02/11/18 08:00 Blood Culture - Preliminary





 Blood    No Growth after 48 hours














Assessment and Plan


Assessment: 





Assessment





Borderline low saturations in a patient with  COPD, bronchiolitis obliterans 

organizing pneumonia, and microcirculatory pulmonary arterial thrombus disease.

  The patient's pretty much at baseline and looks about the same as she did on 

her last admission between February 6 and February 9.  I believe she was sent 

in because of her saturations in the mid to high 80s which is pretty much her 

baseline.





Recent admission for COPD exacerbation complicated by purulent tracheobronchitis

/bronchopneumonia involving the upper lobes





Biopsy-proven bronchiolitis obliterans organizing pneumonia





Microcirculatory pulmonary arteriopathy/thrombi





Obesity





Uterine cancer





Diabetes mellitus





Gastroesophageal reflux disease





Hypertension





Hyperlipidemia





Restless leg syndrome





Migraine cephalgia





Status post VATS lung biopsy





Chronic lower extremity edema with hyperpigmentation and chronic venostasis


Plan: 





Plan dated 02/11/2018





Chest x-rays reviewed.  Is actually better than it was on her last admission.  

The patient's medications and labs will also be reviewed.  We'll get her back 

on her usual medications.  This will include a small dose of prednisone and her 

factor X a inhibitor.  In addition, we'll get her back on oral antibiotics.  

Cardiology will see her.  If they agree that the troponins likely related to 

supply demand mismatch and not ischemia, she could be transferred down to the 

general medical floor.  Additional recommendations and suggestions are 

forthcoming.  Prognosis is guarded.





Plan dated 02/13/2018





The patient seemed be doing relatively well.  She becomes very anxious and she 

gets more short of breath.  Today we had some Xanax 0.25 mg 3 times a day.  

From my perspective, she is likely ready for discharge in the next 24-48 hours.

  She is on her baseline dose of prednisone at 10 mg a day and her factor X a 

inhibitor.  She's looking pretty good in my opinion.  Chest x-rays really 

unchanged.  So far microbiology is negative.  She does have a previous history 

of MRSA infection.  That's not recurrent.  We'll switch her to an oral 

antibiotic.  Additional recommendations and suggestions are forthcoming.


Time with Patient: Less than 30 right upper arm
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
